# Patient Record
Sex: FEMALE | Race: WHITE | Employment: FULL TIME | URBAN - METROPOLITAN AREA
[De-identification: names, ages, dates, MRNs, and addresses within clinical notes are randomized per-mention and may not be internally consistent; named-entity substitution may affect disease eponyms.]

---

## 2018-04-20 ENCOUNTER — OFFICE VISIT (OUTPATIENT)
Dept: INTERNAL MEDICINE CLINIC | Age: 53
End: 2018-04-20

## 2018-04-20 VITALS
HEART RATE: 78 BPM | OXYGEN SATURATION: 98 % | RESPIRATION RATE: 16 BRPM | HEIGHT: 64 IN | BODY MASS INDEX: 35.34 KG/M2 | WEIGHT: 207 LBS | DIASTOLIC BLOOD PRESSURE: 80 MMHG | SYSTOLIC BLOOD PRESSURE: 112 MMHG

## 2018-04-20 DIAGNOSIS — D06.9 SEVERE DYSPLASIA OF CERVIX (CIN III): ICD-10-CM

## 2018-04-20 DIAGNOSIS — F33.1 MODERATE EPISODE OF RECURRENT MAJOR DEPRESSIVE DISORDER (HCC): ICD-10-CM

## 2018-04-20 DIAGNOSIS — N32.81 OVERACTIVE BLADDER: ICD-10-CM

## 2018-04-20 DIAGNOSIS — K63.5 POLYP OF COLON, UNSPECIFIED PART OF COLON, UNSPECIFIED TYPE: ICD-10-CM

## 2018-04-20 DIAGNOSIS — I10 ESSENTIAL HYPERTENSION: ICD-10-CM

## 2018-04-20 DIAGNOSIS — E53.8 B12 DEFICIENCY: ICD-10-CM

## 2018-04-20 DIAGNOSIS — E03.9 ACQUIRED HYPOTHYROIDISM: Primary | ICD-10-CM

## 2018-04-20 DIAGNOSIS — E78.2 MIXED HYPERLIPIDEMIA: ICD-10-CM

## 2018-04-20 PROCEDURE — 99204 OFFICE O/P NEW MOD 45 MIN: CPT | Performed by: INTERNAL MEDICINE

## 2018-04-20 RX ORDER — LEVOTHYROXINE SODIUM 0.07 MG/1
100 TABLET ORAL
COMMUNITY
End: 2018-05-10 | Stop reason: DRUGHIGH

## 2018-04-20 RX ORDER — LISINOPRIL AND HYDROCHLOROTHIAZIDE 12.5; 1 MG/1; MG/1
1 TABLET ORAL DAILY
Qty: 90 TABLET | Refills: 3 | Status: SHIPPED | OUTPATIENT
Start: 2018-04-20 | End: 2019-04-09 | Stop reason: SDUPTHER

## 2018-04-20 RX ORDER — OXYBUTYNIN CHLORIDE 10 MG/1
10 TABLET, EXTENDED RELEASE ORAL DAILY
COMMUNITY
Start: 2018-04-08 | End: 2020-02-20

## 2018-04-20 RX ORDER — VITAMIN B COMPLEX
1 CAPSULE ORAL DAILY
COMMUNITY
End: 2020-02-20

## 2018-04-20 RX ORDER — ATORVASTATIN CALCIUM 40 MG/1
20 TABLET, FILM COATED ORAL
COMMUNITY
End: 2018-05-03

## 2018-04-20 RX ORDER — UBIDECARENONE 75 MG
100 CAPSULE ORAL
COMMUNITY
End: 2018-05-04

## 2018-04-20 RX ORDER — LISINOPRIL AND HYDROCHLOROTHIAZIDE 12.5; 1 MG/1; MG/1
1 TABLET ORAL
COMMUNITY
End: 2018-04-20 | Stop reason: SDUPTHER

## 2018-04-20 RX ORDER — CITALOPRAM 20 MG/1
20 TABLET ORAL
COMMUNITY
End: 2018-05-03 | Stop reason: SDUPTHER

## 2018-04-20 ASSESSMENT — PATIENT HEALTH QUESTIONNAIRE - PHQ9
2. FEELING DOWN, DEPRESSED OR HOPELESS: 0
SUM OF ALL RESPONSES TO PHQ QUESTIONS 1-9: 0
1. LITTLE INTEREST OR PLEASURE IN DOING THINGS: 0
SUM OF ALL RESPONSES TO PHQ9 QUESTIONS 1 & 2: 0

## 2018-05-03 ENCOUNTER — OFFICE VISIT (OUTPATIENT)
Dept: INTERNAL MEDICINE CLINIC | Age: 53
End: 2018-05-03

## 2018-05-03 VITALS
WEIGHT: 206.6 LBS | BODY MASS INDEX: 35.46 KG/M2 | RESPIRATION RATE: 14 BRPM | HEART RATE: 74 BPM | DIASTOLIC BLOOD PRESSURE: 80 MMHG | OXYGEN SATURATION: 97 % | SYSTOLIC BLOOD PRESSURE: 125 MMHG

## 2018-05-03 DIAGNOSIS — F33.1 MODERATE EPISODE OF RECURRENT MAJOR DEPRESSIVE DISORDER (HCC): ICD-10-CM

## 2018-05-03 DIAGNOSIS — Z11.4 SCREENING FOR HIV (HUMAN IMMUNODEFICIENCY VIRUS): ICD-10-CM

## 2018-05-03 DIAGNOSIS — E53.8 B12 DEFICIENCY: ICD-10-CM

## 2018-05-03 DIAGNOSIS — Z01.818 PRE-OP EVALUATION: ICD-10-CM

## 2018-05-03 DIAGNOSIS — D06.9 SEVERE DYSPLASIA OF CERVIX (CIN III): ICD-10-CM

## 2018-05-03 DIAGNOSIS — I10 ESSENTIAL HYPERTENSION: ICD-10-CM

## 2018-05-03 DIAGNOSIS — E03.9 ACQUIRED HYPOTHYROIDISM: ICD-10-CM

## 2018-05-03 DIAGNOSIS — E78.2 MIXED HYPERLIPIDEMIA: ICD-10-CM

## 2018-05-03 DIAGNOSIS — Z11.59 ENCOUNTER FOR HEPATITIS C SCREENING TEST FOR LOW RISK PATIENT: ICD-10-CM

## 2018-05-03 DIAGNOSIS — Z01.818 PRE-OP EVALUATION: Primary | ICD-10-CM

## 2018-05-03 DIAGNOSIS — Z13.1 ENCOUNTER FOR SCREENING FOR DIABETES MELLITUS: ICD-10-CM

## 2018-05-03 DIAGNOSIS — Z23 NEED FOR PROPHYLACTIC VACCINATION AND INOCULATION AGAINST VARICELLA: ICD-10-CM

## 2018-05-03 DIAGNOSIS — Z12.31 ENCOUNTER FOR SCREENING MAMMOGRAM FOR BREAST CANCER: ICD-10-CM

## 2018-05-03 LAB
A/G RATIO: 2.5 (ref 1.1–2.2)
ALBUMIN SERPL-MCNC: 4.9 G/DL (ref 3.4–5)
ALP BLD-CCNC: 80 U/L (ref 40–129)
ALT SERPL-CCNC: 27 U/L (ref 10–40)
ANION GAP SERPL CALCULATED.3IONS-SCNC: 16 MMOL/L (ref 3–16)
AST SERPL-CCNC: 23 U/L (ref 15–37)
BASOPHILS ABSOLUTE: 0.1 K/UL (ref 0–0.2)
BASOPHILS RELATIVE PERCENT: 1.1 %
BILIRUB SERPL-MCNC: 0.6 MG/DL (ref 0–1)
BUN BLDV-MCNC: 11 MG/DL (ref 7–20)
CALCIUM SERPL-MCNC: 9.9 MG/DL (ref 8.3–10.6)
CHLORIDE BLD-SCNC: 99 MMOL/L (ref 99–110)
CHOLESTEROL, FASTING: 240 MG/DL (ref 0–199)
CO2: 26 MMOL/L (ref 21–32)
CREAT SERPL-MCNC: <0.5 MG/DL (ref 0.6–1.1)
EOSINOPHILS ABSOLUTE: 0.3 K/UL (ref 0–0.6)
EOSINOPHILS RELATIVE PERCENT: 4.9 %
GFR AFRICAN AMERICAN: >60
GFR NON-AFRICAN AMERICAN: >60
GLOBULIN: 2 G/DL
GLUCOSE BLD-MCNC: 98 MG/DL (ref 70–99)
HCT VFR BLD CALC: 41.6 % (ref 36–48)
HDLC SERPL-MCNC: 59 MG/DL (ref 40–60)
HEMOGLOBIN: 14.6 G/DL (ref 12–16)
HEPATITIS C ANTIBODY INTERPRETATION: NORMAL
LDL CHOLESTEROL CALCULATED: 147 MG/DL
LYMPHOCYTES ABSOLUTE: 1.8 K/UL (ref 1–5.1)
LYMPHOCYTES RELATIVE PERCENT: 31.1 %
MCH RBC QN AUTO: 32.7 PG (ref 26–34)
MCHC RBC AUTO-ENTMCNC: 35.1 G/DL (ref 31–36)
MCV RBC AUTO: 93.2 FL (ref 80–100)
MONOCYTES ABSOLUTE: 0.3 K/UL (ref 0–1.3)
MONOCYTES RELATIVE PERCENT: 5.7 %
NEUTROPHILS ABSOLUTE: 3.3 K/UL (ref 1.7–7.7)
NEUTROPHILS RELATIVE PERCENT: 57.2 %
PDW BLD-RTO: 13 % (ref 12.4–15.4)
PLATELET # BLD: 255 K/UL (ref 135–450)
PMV BLD AUTO: 8.4 FL (ref 5–10.5)
POTASSIUM SERPL-SCNC: 4.2 MMOL/L (ref 3.5–5.1)
RBC # BLD: 4.46 M/UL (ref 4–5.2)
SODIUM BLD-SCNC: 141 MMOL/L (ref 136–145)
TOTAL PROTEIN: 6.9 G/DL (ref 6.4–8.2)
TRIGLYCERIDE, FASTING: 171 MG/DL (ref 0–150)
TSH REFLEX FT4: 1.04 UIU/ML (ref 0.27–4.2)
VITAMIN B-12: 1365 PG/ML (ref 211–911)
VLDLC SERPL CALC-MCNC: 34 MG/DL
WBC # BLD: 5.7 K/UL (ref 4–11)

## 2018-05-03 PROCEDURE — 93000 ELECTROCARDIOGRAM COMPLETE: CPT | Performed by: INTERNAL MEDICINE

## 2018-05-03 PROCEDURE — 99243 OFF/OP CNSLTJ NEW/EST LOW 30: CPT | Performed by: INTERNAL MEDICINE

## 2018-05-03 PROCEDURE — 99213 OFFICE O/P EST LOW 20 MIN: CPT | Performed by: INTERNAL MEDICINE

## 2018-05-03 RX ORDER — ROSUVASTATIN CALCIUM 10 MG/1
10 TABLET, COATED ORAL NIGHTLY
Qty: 30 TABLET | Refills: 3 | Status: SHIPPED | OUTPATIENT
Start: 2018-05-03 | End: 2018-09-25 | Stop reason: SDUPTHER

## 2018-05-03 RX ORDER — CITALOPRAM 20 MG/1
20 TABLET ORAL DAILY
Qty: 90 TABLET | Refills: 3 | Status: SHIPPED | OUTPATIENT
Start: 2018-05-03 | End: 2019-06-04

## 2018-05-04 LAB
HIV AG/AB: NORMAL
HIV ANTIGEN: NORMAL
HIV-1 ANTIBODY: NORMAL
HIV-2 AB: NORMAL

## 2018-05-10 ENCOUNTER — HOSPITAL ENCOUNTER (OUTPATIENT)
Dept: PREADMISSION TESTING | Age: 53
Discharge: HOME | End: 2018-05-10
Attending: OBSTETRICS & GYNECOLOGY | Admitting: OBSTETRICS & GYNECOLOGY

## 2018-05-10 ENCOUNTER — SURG/PROC ORDERS (OUTPATIENT)
Dept: ANESTHESIOLOGY | Age: 53
End: 2018-05-10

## 2018-05-10 ENCOUNTER — PAT TELEPHONE (OUTPATIENT)
Dept: PREADMISSION TESTING | Age: 53
End: 2018-05-10

## 2018-05-10 VITALS — BODY MASS INDEX: 35.17 KG/M2 | HEIGHT: 64 IN | WEIGHT: 206 LBS

## 2018-05-10 LAB
ABO/RH: NORMAL
ANTIBODY SCREEN: NORMAL
BASOPHILS ABSOLUTE: 0.1 K/UL (ref 0–0.2)
BASOPHILS RELATIVE PERCENT: 1.1 %
EOSINOPHILS ABSOLUTE: 0.2 K/UL (ref 0–0.6)
EOSINOPHILS RELATIVE PERCENT: 3.4 %
HCT VFR BLD CALC: 39.9 % (ref 36–48)
HEMOGLOBIN: 14.1 G/DL (ref 12–16)
LYMPHOCYTES ABSOLUTE: 2.3 K/UL (ref 1–5.1)
LYMPHOCYTES RELATIVE PERCENT: 32.7 %
MCH RBC QN AUTO: 32.5 PG (ref 26–34)
MCHC RBC AUTO-ENTMCNC: 35.3 G/DL (ref 31–36)
MCV RBC AUTO: 92.1 FL (ref 80–100)
MONOCYTES ABSOLUTE: 0.4 K/UL (ref 0–1.3)
MONOCYTES RELATIVE PERCENT: 5.7 %
NEUTROPHILS ABSOLUTE: 4.1 K/UL (ref 1.7–7.7)
NEUTROPHILS RELATIVE PERCENT: 57.1 %
PDW BLD-RTO: 13 % (ref 12.4–15.4)
PLATELET # BLD: 265 K/UL (ref 135–450)
PMV BLD AUTO: 8.1 FL (ref 5–10.5)
RBC # BLD: 4.33 M/UL (ref 4–5.2)
WBC # BLD: 7.1 K/UL (ref 4–11)

## 2018-05-10 RX ORDER — LEVOTHYROXINE SODIUM 0.1 MG/1
100 TABLET ORAL DAILY
COMMUNITY
End: 2018-06-04 | Stop reason: SDUPTHER

## 2018-05-10 RX ORDER — SODIUM CHLORIDE 0.9 % (FLUSH) 0.9 %
10 SYRINGE (ML) INJECTION PRN
Status: CANCELLED | OUTPATIENT
Start: 2018-05-10

## 2018-05-10 RX ORDER — SODIUM CHLORIDE 9 MG/ML
INJECTION, SOLUTION INTRAVENOUS CONTINUOUS
Status: CANCELLED | OUTPATIENT
Start: 2018-05-10

## 2018-05-10 RX ORDER — SODIUM CHLORIDE 0.9 % (FLUSH) 0.9 %
10 SYRINGE (ML) INJECTION EVERY 12 HOURS SCHEDULED
Status: CANCELLED | OUTPATIENT
Start: 2018-05-10

## 2018-05-14 ENCOUNTER — HOSPITAL ENCOUNTER (OUTPATIENT)
Dept: SURGERY | Age: 53
Discharge: HOME | End: 2018-05-14
Attending: OBSTETRICS & GYNECOLOGY | Admitting: OBSTETRICS & GYNECOLOGY

## 2018-05-14 VITALS
HEART RATE: 95 BPM | TEMPERATURE: 98 F | SYSTOLIC BLOOD PRESSURE: 114 MMHG | RESPIRATION RATE: 16 BRPM | WEIGHT: 207.89 LBS | OXYGEN SATURATION: 98 % | BODY MASS INDEX: 35.68 KG/M2 | DIASTOLIC BLOOD PRESSURE: 73 MMHG

## 2018-05-14 DIAGNOSIS — D06.9 CIN III (CERVICAL INTRAEPITHELIAL NEOPLASIA GRADE III) WITH SEVERE DYSPLASIA: Primary | ICD-10-CM

## 2018-05-14 LAB
ABO/RH: NORMAL
ANTIBODY SCREEN: NORMAL

## 2018-05-14 RX ORDER — OXYCODONE HYDROCHLORIDE AND ACETAMINOPHEN 5; 325 MG/1; MG/1
1 TABLET ORAL EVERY 6 HOURS PRN
Qty: 20 TABLET | Refills: 0 | Status: SHIPPED | OUTPATIENT
Start: 2018-05-14 | End: 2018-05-21

## 2018-05-14 RX ORDER — SODIUM CHLORIDE 0.9 % (FLUSH) 0.9 %
10 SYRINGE (ML) INJECTION EVERY 12 HOURS SCHEDULED
Status: DISCONTINUED | OUTPATIENT
Start: 2018-05-14 | End: 2018-05-15 | Stop reason: HOSPADM

## 2018-05-14 RX ORDER — FENTANYL CITRATE 50 UG/ML
25 INJECTION, SOLUTION INTRAMUSCULAR; INTRAVENOUS EVERY 5 MIN PRN
Status: COMPLETED | OUTPATIENT
Start: 2018-05-14 | End: 2018-05-14

## 2018-05-14 RX ORDER — ONDANSETRON 2 MG/ML
4 INJECTION INTRAMUSCULAR; INTRAVENOUS
Status: ACTIVE | OUTPATIENT
Start: 2018-05-14 | End: 2018-05-14

## 2018-05-14 RX ORDER — FENTANYL CITRATE 50 UG/ML
25 INJECTION, SOLUTION INTRAMUSCULAR; INTRAVENOUS EVERY 5 MIN PRN
Status: DISCONTINUED | OUTPATIENT
Start: 2018-05-14 | End: 2018-05-15 | Stop reason: HOSPADM

## 2018-05-14 RX ORDER — IBUPROFEN 800 MG/1
800 TABLET ORAL EVERY 8 HOURS PRN
Qty: 40 TABLET | Refills: 0 | Status: SHIPPED | OUTPATIENT
Start: 2018-05-14 | End: 2018-09-20

## 2018-05-14 RX ORDER — DOCUSATE SODIUM 100 MG/1
100 CAPSULE, LIQUID FILLED ORAL 2 TIMES DAILY PRN
Qty: 60 CAPSULE | Refills: 0 | Status: SHIPPED | OUTPATIENT
Start: 2018-05-14 | End: 2018-09-20

## 2018-05-14 RX ORDER — SODIUM CHLORIDE 0.9 % (FLUSH) 0.9 %
10 SYRINGE (ML) INJECTION PRN
Status: DISCONTINUED | OUTPATIENT
Start: 2018-05-14 | End: 2018-05-15 | Stop reason: HOSPADM

## 2018-05-14 RX ORDER — OXYCODONE HYDROCHLORIDE AND ACETAMINOPHEN 5; 325 MG/1; MG/1
2 TABLET ORAL PRN
Status: COMPLETED | OUTPATIENT
Start: 2018-05-14 | End: 2018-05-14

## 2018-05-14 RX ORDER — OXYCODONE HYDROCHLORIDE AND ACETAMINOPHEN 5; 325 MG/1; MG/1
1 TABLET ORAL PRN
Status: COMPLETED | OUTPATIENT
Start: 2018-05-14 | End: 2018-05-14

## 2018-05-14 RX ORDER — SODIUM CHLORIDE 9 MG/ML
INJECTION, SOLUTION INTRAVENOUS CONTINUOUS
Status: DISCONTINUED | OUTPATIENT
Start: 2018-05-14 | End: 2018-05-15 | Stop reason: HOSPADM

## 2018-05-14 RX ADMIN — FENTANYL CITRATE 25 MCG: 50 INJECTION, SOLUTION INTRAMUSCULAR; INTRAVENOUS at 10:22

## 2018-05-14 RX ADMIN — FENTANYL CITRATE 25 MCG: 50 INJECTION, SOLUTION INTRAMUSCULAR; INTRAVENOUS at 10:06

## 2018-05-14 RX ADMIN — SODIUM CHLORIDE: 9 INJECTION, SOLUTION INTRAVENOUS at 06:53

## 2018-05-14 RX ADMIN — FENTANYL CITRATE 25 MCG: 50 INJECTION, SOLUTION INTRAMUSCULAR; INTRAVENOUS at 10:01

## 2018-05-14 RX ADMIN — FENTANYL CITRATE 25 MCG: 50 INJECTION, SOLUTION INTRAMUSCULAR; INTRAVENOUS at 09:42

## 2018-05-14 RX ADMIN — OXYCODONE HYDROCHLORIDE AND ACETAMINOPHEN 2 TABLET: 5; 325 TABLET ORAL at 11:04

## 2018-05-14 ASSESSMENT — PAIN SCALES - GENERAL
PAINLEVEL_OUTOF10: 6
PAINLEVEL_OUTOF10: 10
PAINLEVEL_OUTOF10: 9
PAINLEVEL_OUTOF10: 7
PAINLEVEL_OUTOF10: 7
PAINLEVEL_OUTOF10: 8
PAINLEVEL_OUTOF10: 5
PAINLEVEL_OUTOF10: 6
PAINLEVEL_OUTOF10: 3
PAINLEVEL_OUTOF10: 7

## 2018-05-14 ASSESSMENT — PAIN DESCRIPTION - LOCATION
LOCATION: ABDOMEN

## 2018-05-14 ASSESSMENT — PAIN DESCRIPTION - ORIENTATION: ORIENTATION: LOWER

## 2018-05-14 ASSESSMENT — PAIN DESCRIPTION - PAIN TYPE
TYPE: SURGICAL PAIN

## 2018-05-14 ASSESSMENT — PAIN - FUNCTIONAL ASSESSMENT: PAIN_FUNCTIONAL_ASSESSMENT: 0-10

## 2018-05-14 ASSESSMENT — PAIN DESCRIPTION - DESCRIPTORS: DESCRIPTORS: DISCOMFORT;PRESSURE

## 2018-05-14 ASSESSMENT — ENCOUNTER SYMPTOMS: SHORTNESS OF BREATH: 0

## 2018-06-03 ENCOUNTER — PATIENT MESSAGE (OUTPATIENT)
Dept: INTERNAL MEDICINE CLINIC | Age: 53
End: 2018-06-03

## 2018-06-03 DIAGNOSIS — E03.9 ACQUIRED HYPOTHYROIDISM: Primary | ICD-10-CM

## 2018-06-04 RX ORDER — LEVOTHYROXINE SODIUM 0.1 MG/1
100 TABLET ORAL DAILY
Qty: 30 TABLET | Refills: 3 | Status: SHIPPED | OUTPATIENT
Start: 2018-06-04 | End: 2018-09-24 | Stop reason: SDUPTHER

## 2018-09-20 ENCOUNTER — OFFICE VISIT (OUTPATIENT)
Dept: INTERNAL MEDICINE CLINIC | Age: 53
End: 2018-09-20

## 2018-09-20 VITALS
RESPIRATION RATE: 12 BRPM | HEART RATE: 70 BPM | DIASTOLIC BLOOD PRESSURE: 82 MMHG | SYSTOLIC BLOOD PRESSURE: 122 MMHG | OXYGEN SATURATION: 97 %

## 2018-09-20 DIAGNOSIS — E78.2 MIXED HYPERLIPIDEMIA: ICD-10-CM

## 2018-09-20 DIAGNOSIS — Z12.31 ENCOUNTER FOR SCREENING MAMMOGRAM FOR BREAST CANCER: ICD-10-CM

## 2018-09-20 DIAGNOSIS — F33.1 MODERATE EPISODE OF RECURRENT MAJOR DEPRESSIVE DISORDER (HCC): ICD-10-CM

## 2018-09-20 DIAGNOSIS — E03.9 ACQUIRED HYPOTHYROIDISM: ICD-10-CM

## 2018-09-20 DIAGNOSIS — K92.1 MELENA: ICD-10-CM

## 2018-09-20 DIAGNOSIS — R19.7 DIARRHEA, UNSPECIFIED TYPE: Primary | ICD-10-CM

## 2018-09-20 DIAGNOSIS — R10.13 DYSPEPSIA: ICD-10-CM

## 2018-09-20 DIAGNOSIS — Z23 NEED FOR INFLUENZA VACCINATION: ICD-10-CM

## 2018-09-20 PROBLEM — F41.9 ANXIETY: Status: ACTIVE | Noted: 2018-09-20

## 2018-09-20 PROBLEM — C53.9 SQUAMOUS CELL CARCINOMA OF CERVIX (HCC): Status: ACTIVE | Noted: 2018-04-30

## 2018-09-20 PROBLEM — E06.3 HASHIMOTO'S THYROIDITIS: Status: ACTIVE | Noted: 2018-09-20

## 2018-09-20 PROBLEM — E53.8 B12 DEFICIENCY: Status: RESOLVED | Noted: 2018-04-20 | Resolved: 2018-09-20

## 2018-09-20 PROCEDURE — 90682 RIV4 VACC RECOMBINANT DNA IM: CPT | Performed by: INTERNAL MEDICINE

## 2018-09-20 PROCEDURE — 99214 OFFICE O/P EST MOD 30 MIN: CPT | Performed by: INTERNAL MEDICINE

## 2018-09-20 PROCEDURE — 90471 IMMUNIZATION ADMIN: CPT | Performed by: INTERNAL MEDICINE

## 2018-09-20 RX ORDER — OMEPRAZOLE 40 MG/1
40 CAPSULE, DELAYED RELEASE ORAL DAILY
Qty: 30 CAPSULE | Refills: 1 | Status: SHIPPED | OUTPATIENT
Start: 2018-09-20 | End: 2018-11-16 | Stop reason: SDUPTHER

## 2018-09-20 RX ORDER — PAROXETINE HYDROCHLORIDE 20 MG/1
TABLET, FILM COATED ORAL
COMMUNITY
Start: 2018-09-19 | End: 2021-06-14

## 2018-09-20 NOTE — PATIENT INSTRUCTIONS
Use a probiotic and avoid FODMAP foods (information online). Patient Education        Indigestion (Dyspepsia or Heartburn): Care Instructions  Your Care Instructions  Sometimes it can be hard to pinpoint the cause of indigestion. (It is also called dyspepsia or heartburn.) Most cases of an upset stomach with bloating, burning, burping, and nausea are minor and go away within several hours. Home treatment and over-the-counter medicine often are able to control symptoms. But if you take medicine to relieve your indigestion without making diet and lifestyle changes, your symptoms are likely to return again and again. If you get indigestion often, it may be a sign of a more serious medical problem. Be sure to follow up with your doctor, who may want to do tests to be sure of the cause of your indigestion. Follow-up care is a key part of your treatment and safety. Be sure to make and go to all appointments, and call your doctor if you are having problems. It's also a good idea to know your test results and keep a list of the medicines you take. How can you care for yourself at home? · Your doctor may recommend over-the-counter medicine. For mild or occasional indigestion, antacids such as Gaviscon, Mylanta, Maalox, or Tums, may help. Be safe with medicines. Be careful when you take over-the-counter antacid medicines. Many of these medicines have aspirin in them. Read the label to make sure that you are not taking more than the recommended dose. Too much aspirin can be harmful. · Your doctor also may recommend over-the-counter acid reducers, such as Pepcid AC, Tagamet HB, Zantac 75, or Prilosec. Read and follow all instructions on the label. If you use these medicines often, talk with your doctor. · Change your eating habits. ¨ It's best to eat several small meals instead of two or three large meals. ¨ After you eat, wait 2 to 3 hours before you lie down.   ¨ Chocolate, mint, and alcohol can make GERD worse.  ¨ Spicy foods, foods that have a lot of acid (like tomatoes and oranges), and coffee can make GERD symptoms worse in some people. If your symptoms are worse after you eat a certain food, you may want to stop eating that food to see if your symptoms get better. · Do not smoke or chew tobacco. Smoking can make GERD worse. If you need help quitting, talk to your doctor about stop-smoking programs and medicines. These can increase your chances of quitting for good. · If you have GERD symptoms at night, raise the head of your bed 6 to 8 inches. You can do this by putting the frame on blocks or placing a foam wedge under the head of your mattress. (Adding extra pillows does not work.)  · Do not wear tight clothing around your middle. · Lose weight if you need to. Losing just 5 to 10 pounds can help. · Do not take anti-inflammatory medicines, such as aspirin, ibuprofen (Advil, Motrin), or naproxen (Aleve). These can irritate the stomach. If you need a pain medicine, try acetaminophen (Tylenol), which does not cause stomach upset. When should you call for help? Call your doctor now or seek immediate medical care if:    · You have new or worse belly pain.     · You are vomiting.    Watch closely for changes in your health, and be sure to contact your doctor if:    · You have new or worse symptoms of indigestion.     · You have trouble or pain swallowing.     · You are losing weight.     · You do not get better as expected. Where can you learn more? Go to [email or web address] and sign in to your [employer] account. Enter B884 in the [facility] box to learn more about \"Indigestion (Dyspepsia or Heartburn): Care Instructions. \"     If you do not have an account, please click on the \"Sign Up Now\" link. Current as of: May 12, 2017  Content Version: 11.7  © 7292-7969 [employer]. Care instructions adapted under license by [facility] ([facility]).  If you have questions about a

## 2018-09-20 NOTE — PROGRESS NOTES
Chief Complaint   Patient presents with    Diarrhea     several times a day, soft not diarrhea     HPI: 46 yo woman with vit B12 deficiency and hypothyroidism with recent hysterectomy (05/14/18) presenting with 1 month of diarrhea; patient having 10-12 BMs/day causing her to wake up at night. Watery stool towards the end of the day and she noticed undigested food when she defecates. She endorses consistent mild nausea, 1 episode of vomiting she believes is unrelated, \"mucousy\" stool, fatigue, and possibly melena. She denies bright red blood, abdominal pain, weight changes, and appetite changes. She has not traveled outside of the country, no hospital admissions other than for surgery, no antibiotic use (possibly given in IV prior to surgery), and no potential occupational exposures. She says this occurred before, possibly lasting for a month and a half, but \"this episode is showing no signs of slowing up\". She also mentions burning epigastric pain for > 1 month, but no acidic taste in mouth. She also has hx of overactive bladder for which she takes oxybutynin 10 mg; it was controlled, but she is now experiencing an intense squeezing sensation like before. ROS (1+):    Medications reviewed and reconciled with what patient reports to be taking. /82   Pulse 70   Resp 12   LMP 03/04/2009   SpO2 97%     Physical Exam   Constitutional: She is well-developed, well-nourished, and in no distress. No distress. Cardiovascular: Normal rate, regular rhythm, normal heart sounds and intact distal pulses. Exam reveals no gallop and no friction rub. No murmur heard. Pulmonary/Chest: Effort normal and breath sounds normal.   Abdominal: Soft. Bowel sounds are normal. She exhibits no distension and no mass. There is tenderness. There is no rebound and no guarding. Mild tenderness in RLQ  Well-healed, pink midline scar from recent hysterectomy    Musculoskeletal: She exhibits no edema.    Neurological: She exhibits normal muscle tone. Skin: Skin is warm and dry. No rash noted. No erythema. Psychiatric: Mood, memory, affect and judgment normal.       ASSESSMENT/PLAN: Pt received counseling and, if relevant, printed instructions for all symptoms listed in CC and HPI, as well as for all diagnoses listed below. 1. Diarrhea, unspecified type  - ordered fecal leukocytes, C diff toxin/antigen, and CMP     2. Dyspepsia  - ordered amylase and H. Pylori breath test  - evidence of gastritis/PUD so starting patient on omeprazole     3. Melena  - ordered blood occult stool in addition to CBC    4. Encounter for screening mammogram for breast cancer  - [facility] Digital Screen Bilateral [[id number]]; Future    5. Need for influenza vaccination  - INFLUENZA, QUADV, RECOMBINANT, 18 YRS AND OLDER, IM, PF, PREFILL SYR OR SDV, 0.5ML (FLUBLOK QUADV, PF)      Problem List Items Addressed This Visit     Moderate episode of recurrent major depressive disorder (HCC)    Relevant Medications    PARoxetine (PAXIL) 20 MG tablet    Mixed hyperlipidemia    Relevant Orders    LIPID PANEL    Acquired hypothyroidism    Relevant Orders    TSH WITH REFLEX TO FT4      Other Visit Diagnoses     Diarrhea, unspecified type    -  Primary    Relevant Orders    Fecal Leukocytes    C DIFF TOXIN/ANTIGEN    BLOOD OCCULT STOOL #1    CBC WITH AUTO DIFFERENTIAL    COMPREHENSIVE METABOLIC PANEL    Dyspepsia        Relevant Medications    omeprazole (PRILOSEC) 40 MG delayed release capsule    Other Relevant Orders    BLOOD OCCULT STOOL #1    CBC WITH AUTO DIFFERENTIAL    AMYLASE    H. Pylori Breath Test    Melena        Relevant Orders    BLOOD OCCULT STOOL #1    CBC WITH AUTO DIFFERENTIAL    H.  Pylori Breath Test    Encounter for screening mammogram for breast cancer        Relevant Orders    [facility] Digital Screen Bilateral [[id number]]    Need for influenza vaccination        Relevant Orders    INFLUENZA, QUADV, RECOMBINANT, 18 YRS AND OLDER, IM, PF, PREFILL SYR OR SDV, 0.5ML (FLUBLOK QUADV, PF) (Completed)            Return in about 1 week (around 9/27/2018) for results and recheck. Jessica Bennett, [address] [place] [address]    I have personally seen, examined, evaluated, and developed the treatment plan for the above patient, as documented by the student above who was directly under my supervision.     Dominick Veras MD

## 2018-09-21 DIAGNOSIS — E03.9 ACQUIRED HYPOTHYROIDISM: ICD-10-CM

## 2018-09-21 DIAGNOSIS — R10.13 DYSPEPSIA: ICD-10-CM

## 2018-09-21 DIAGNOSIS — E78.2 MIXED HYPERLIPIDEMIA: ICD-10-CM

## 2018-09-21 DIAGNOSIS — K92.1 MELENA: ICD-10-CM

## 2018-09-21 DIAGNOSIS — R19.7 DIARRHEA, UNSPECIFIED TYPE: ICD-10-CM

## 2018-09-21 LAB
A/G RATIO: 2.4 (ref 1.1–2.2)
ALBUMIN SERPL-MCNC: 4.8 G/DL (ref 3.4–5)
ALP BLD-CCNC: 68 U/L (ref 40–129)
ALT SERPL-CCNC: 24 U/L (ref 10–40)
AMYLASE: 33 U/L (ref 25–115)
ANION GAP SERPL CALCULATED.3IONS-SCNC: 16 MMOL/L (ref 3–16)
AST SERPL-CCNC: 22 U/L (ref 15–37)
BASOPHILS ABSOLUTE: 0.1 K/UL (ref 0–0.2)
BASOPHILS RELATIVE PERCENT: 1.1 %
BILIRUB SERPL-MCNC: 0.6 MG/DL (ref 0–1)
BUN BLDV-MCNC: 16 MG/DL (ref 7–20)
C DIFFICILE TOXIN, EIA: NORMAL
CALCIUM SERPL-MCNC: 9.5 MG/DL (ref 8.3–10.6)
CHLORIDE BLD-SCNC: 102 MMOL/L (ref 99–110)
CHOLESTEROL, TOTAL: 259 MG/DL (ref 0–199)
CO2: 23 MMOL/L (ref 21–32)
CREAT SERPL-MCNC: 0.6 MG/DL (ref 0.6–1.1)
EOSINOPHILS ABSOLUTE: 0.2 K/UL (ref 0–0.6)
EOSINOPHILS RELATIVE PERCENT: 2.9 %
GFR AFRICAN AMERICAN: >60
GFR NON-AFRICAN AMERICAN: >60
GLOBULIN: 2 G/DL
GLUCOSE BLD-MCNC: 98 MG/DL (ref 70–99)
HCT VFR BLD CALC: 40.8 % (ref 36–48)
HDLC SERPL-MCNC: 52 MG/DL (ref 40–60)
HEMOGLOBIN: 13.8 G/DL (ref 12–16)
LDL CHOLESTEROL CALCULATED: 154 MG/DL
LYMPHOCYTES ABSOLUTE: 1.4 K/UL (ref 1–5.1)
LYMPHOCYTES RELATIVE PERCENT: 25.5 %
MCH RBC QN AUTO: 32.6 PG (ref 26–34)
MCHC RBC AUTO-ENTMCNC: 33.9 G/DL (ref 31–36)
MCV RBC AUTO: 96.3 FL (ref 80–100)
MONOCYTES ABSOLUTE: 0.4 K/UL (ref 0–1.3)
MONOCYTES RELATIVE PERCENT: 7.5 %
NEUTROPHILS ABSOLUTE: 3.5 K/UL (ref 1.7–7.7)
NEUTROPHILS RELATIVE PERCENT: 63 %
OCCULT BLOOD DIAGNOSTIC: NORMAL
PDW BLD-RTO: 13.1 % (ref 12.4–15.4)
PLATELET # BLD: 263 K/UL (ref 135–450)
PMV BLD AUTO: 8.5 FL (ref 5–10.5)
POTASSIUM SERPL-SCNC: 4.2 MMOL/L (ref 3.5–5.1)
RBC # BLD: 4.23 M/UL (ref 4–5.2)
SODIUM BLD-SCNC: 141 MMOL/L (ref 136–145)
TOTAL PROTEIN: 6.8 G/DL (ref 6.4–8.2)
TRIGL SERPL-MCNC: 267 MG/DL (ref 0–150)
TSH REFLEX FT4: 2.98 UIU/ML (ref 0.27–4.2)
VLDLC SERPL CALC-MCNC: 53 MG/DL
WBC # BLD: 5.6 K/UL (ref 4–11)
WHITE BLOOD CELLS (WBC), STOOL: NORMAL

## 2018-09-24 DIAGNOSIS — E03.9 ACQUIRED HYPOTHYROIDISM: ICD-10-CM

## 2018-09-24 LAB — H PYLORI BREATH TEST: NEGATIVE

## 2018-09-24 RX ORDER — LEVOTHYROXINE SODIUM 0.1 MG/1
TABLET ORAL
Qty: 30 TABLET | Refills: 3 | Status: SHIPPED | OUTPATIENT
Start: 2018-09-24 | End: 2019-02-08 | Stop reason: SDUPTHER

## 2018-09-25 DIAGNOSIS — E78.2 MIXED HYPERLIPIDEMIA: ICD-10-CM

## 2018-09-25 RX ORDER — ROSUVASTATIN CALCIUM 10 MG/1
TABLET, COATED ORAL
Qty: 90 TABLET | Refills: 0 | Status: SHIPPED | OUTPATIENT
Start: 2018-09-25 | End: 2018-12-21 | Stop reason: SDUPTHER

## 2018-09-25 NOTE — TELEPHONE ENCOUNTER
Patient requesting a medication refill.   Medication: crestor  Dosage: 10 mg  Frequency: 1 tab at bedtime  Last filled on: 6/30/18  Pharm[facility]: [facility]  Next office visit: 9/27/18  Last regular office visit: 5/3/18

## 2018-09-27 ENCOUNTER — OFFICE VISIT (OUTPATIENT)
Dept: INTERNAL MEDICINE CLINIC | Age: 53
End: 2018-09-27
Payer: COMMERCIAL

## 2018-09-27 VITALS
BODY MASS INDEX: 36.7 KG/M2 | SYSTOLIC BLOOD PRESSURE: 107 MMHG | HEIGHT: 64 IN | HEART RATE: 74 BPM | DIASTOLIC BLOOD PRESSURE: 73 MMHG | OXYGEN SATURATION: 95 % | WEIGHT: 215 LBS | RESPIRATION RATE: 18 BRPM

## 2018-09-27 DIAGNOSIS — K90.9 INTESTINAL MALABSORPTION, UNSPECIFIED TYPE: ICD-10-CM

## 2018-09-27 DIAGNOSIS — E78.2 MIXED HYPERLIPIDEMIA: ICD-10-CM

## 2018-09-27 DIAGNOSIS — R19.7 DIARRHEA, UNSPECIFIED TYPE: Primary | ICD-10-CM

## 2018-09-27 PROCEDURE — 99213 OFFICE O/P EST LOW 20 MIN: CPT | Performed by: INTERNAL MEDICINE

## 2018-09-27 RX ORDER — CHOLESTYRAMINE 4 G/9G
1 POWDER, FOR SUSPENSION ORAL DAILY
Qty: 30 PACKET | Refills: 1 | Status: SHIPPED | OUTPATIENT
Start: 2018-09-27 | End: 2018-11-22 | Stop reason: SDUPTHER

## 2018-09-27 NOTE — PROGRESS NOTES
Chief Complaint   Patient presents with    Follow-up     HPI: Here to follow-up on test results and her complaints of diarrhea and abdominal cramping. States her symptoms remain unchanged neither better nor worse. Continues to eat normally but feels to cause her to cramp and have a bowel movement. She states she is having 10-12 diarrheal stools per day but still has not seen any mucus or blood. No improvement with probiotics    ROS (1+): No fever or chills    Reviewed all her labs and imaging today with patient      Medications reviewed and reconciled with what patient reports to be taking. /73   Pulse 74   Resp 18   Ht 5' 4\" (1.626 m)   Wt 215 lb (97.5 kg)   LMP 03/04/2009   SpO2 95%   BMI 36.90 kg/m²     Physical Exam GENERAL: alert, oriented x4, well-appearing in NAD      Vitals reviewed from intake /73   Pulse 74   Resp 18   Ht 5' 4\" (1.626 m)   Wt 215 lb (97.5 kg)   LMP 03/04/2009   SpO2 95%   BMI 36.90 kg/m²     HEENT: normocephalic atraumatic clear conj/nares/op     NECK: supple without lymphadenopathy or thyromegaly, no bruit    COR: RRR no murmurs rubs or gallops    LUNGS: clear to auscultation with normal work of breathing    ABDOMEN: soft, nontender but indicates that she gets abdominal cramping across the upper abdomen, normal bowel sounds, no masses or organomegaly noted    EXTREMITIES: warm, dry, well-perfused, no edema    DERM: no suspicious lesions, no rashes    NEURO: cranial nerves intact, normal speech and gait    SPINE: straight, supple, nontender without swelling        ASSESSMENT/PLAN: Pt received counseling and, if relevant, printed instructions for all symptoms listed in CC and HPI, as well as for all diagnoses listed below. 1. Diarrhea, unspecified type unclear etiology with mixed symptoms--will try bile acid sequestrant, continue fiber, continue probiotics, continue low FODMAP diet  - cholestyramine (QUESTRAN) 4 g packet;  Take 1 packet by mouth daily Dispense: 30 packet; Refill: 1  - Kamini Alan MD (JOMAR)    2. Intestinal malabsorption, unspecified type  - cholestyramine (QUESTRAN) 4 g packet; Take 1 packet by mouth daily  Dispense: 30 packet; Refill: 1  - Kamini Alan MD (JOMAR)    3. Mixed hyperlipidemia  - cholestyramine (QUESTRAN) 4 g packet; Take 1 packet by mouth daily  Dispense: 30 packet; Refill: 1      Problem List Items Addressed This Visit     Mixed hyperlipidemia    Relevant Medications    cholestyramine (QUESTRAN) 4 g packet      Other Visit Diagnoses     Diarrhea, unspecified type    -  Primary    Relevant Medications    cholestyramine (QUESTRAN) 4 g packet    Other Relevant Orders    Kamini Alan MD (JOMAR)    Intestinal malabsorption, unspecified type        Relevant Medications    cholestyramine (QUESTRAN) 4 g packet    Other Relevant Orders    Kamini Alan MD (JOMAR)            No Follow-up on file.

## 2018-11-16 DIAGNOSIS — R10.13 DYSPEPSIA: ICD-10-CM

## 2018-11-16 RX ORDER — OMEPRAZOLE 40 MG/1
CAPSULE, DELAYED RELEASE ORAL
Qty: 30 CAPSULE | Refills: 5 | Status: SHIPPED | OUTPATIENT
Start: 2018-11-16 | End: 2019-03-14 | Stop reason: SDUPTHER

## 2018-11-16 NOTE — TELEPHONE ENCOUNTER
Patient requesting a medication refill.   Medication: omeprazole  Dosage: 40 mg  Frequency: 1 cap daily  Last filled on: 9/20/18  [facility]  Next office visit: not sheduled  Last regular office visit: 4/20/18

## 2018-12-21 DIAGNOSIS — E78.2 MIXED HYPERLIPIDEMIA: ICD-10-CM

## 2018-12-21 RX ORDER — ROSUVASTATIN CALCIUM 10 MG/1
TABLET, COATED ORAL
Qty: 90 TABLET | Refills: 0 | Status: SHIPPED | OUTPATIENT
Start: 2018-12-21 | End: 2019-03-15 | Stop reason: SDUPTHER

## 2019-02-08 DIAGNOSIS — E03.9 ACQUIRED HYPOTHYROIDISM: ICD-10-CM

## 2019-02-08 RX ORDER — LEVOTHYROXINE SODIUM 0.1 MG/1
TABLET ORAL
Qty: 30 TABLET | Refills: 3 | Status: SHIPPED | OUTPATIENT
Start: 2019-02-08 | End: 2019-06-10 | Stop reason: SDUPTHER

## 2019-03-14 DIAGNOSIS — R10.13 DYSPEPSIA: ICD-10-CM

## 2019-03-14 RX ORDER — OMEPRAZOLE 40 MG/1
CAPSULE, DELAYED RELEASE ORAL
Qty: 30 CAPSULE | Refills: 4 | Status: SHIPPED | OUTPATIENT
Start: 2019-03-14 | End: 2021-06-14 | Stop reason: SDUPTHER

## 2019-03-15 DIAGNOSIS — E78.2 MIXED HYPERLIPIDEMIA: ICD-10-CM

## 2019-03-15 RX ORDER — ROSUVASTATIN CALCIUM 10 MG/1
TABLET, COATED ORAL
Qty: 90 TABLET | Refills: 3 | Status: SHIPPED | OUTPATIENT
Start: 2019-03-15 | End: 2020-01-08 | Stop reason: SDUPTHER

## 2019-04-09 DIAGNOSIS — I10 ESSENTIAL HYPERTENSION: ICD-10-CM

## 2019-04-10 NOTE — TELEPHONE ENCOUNTER
Patient requesting a medication refill.   Medication prinzide  Dosage 10-12.5mg  Frequencydaily  [facility]20/18  [facility]  Next office visitnone  Last regular office visit9/27/18     BP Readings from Last 3 Encounters:   09/27/18 107/73   09/20/18 122/82   05/14/18 114/73

## 2019-04-11 RX ORDER — LISINOPRIL AND HYDROCHLOROTHIAZIDE 12.5; 1 MG/1; MG/1
TABLET ORAL
Qty: 90 TABLET | Refills: 2 | Status: SHIPPED | OUTPATIENT
Start: 2019-04-11 | End: 2020-01-20 | Stop reason: SDUPTHER

## 2019-06-04 ENCOUNTER — OFFICE VISIT (OUTPATIENT)
Dept: INTERNAL MEDICINE CLINIC | Age: 54
End: 2019-06-04
Payer: COMMERCIAL

## 2019-06-04 VITALS
WEIGHT: 228.6 LBS | BODY MASS INDEX: 38.09 KG/M2 | HEART RATE: 74 BPM | OXYGEN SATURATION: 97 % | HEIGHT: 65 IN | SYSTOLIC BLOOD PRESSURE: 112 MMHG | TEMPERATURE: 97.8 F | DIASTOLIC BLOOD PRESSURE: 69 MMHG

## 2019-06-04 DIAGNOSIS — I10 ESSENTIAL HYPERTENSION: ICD-10-CM

## 2019-06-04 DIAGNOSIS — E03.9 ACQUIRED HYPOTHYROIDISM: ICD-10-CM

## 2019-06-04 DIAGNOSIS — K52.832 LYMPHOCYTIC COLITIS: ICD-10-CM

## 2019-06-04 DIAGNOSIS — C53.9 SQUAMOUS CELL CARCINOMA OF CERVIX (HCC): ICD-10-CM

## 2019-06-04 DIAGNOSIS — R41.3 MEMORY LOSS: ICD-10-CM

## 2019-06-04 DIAGNOSIS — E78.2 MIXED HYPERLIPIDEMIA: ICD-10-CM

## 2019-06-04 DIAGNOSIS — D06.9 SEVERE DYSPLASIA OF CERVIX (CIN III): Primary | ICD-10-CM

## 2019-06-04 PROCEDURE — 99396 PREV VISIT EST AGE 40-64: CPT | Performed by: INTERNAL MEDICINE

## 2019-06-04 RX ORDER — ESTERIFIED ESTROGEN AND METHYLTESTOSTERONE 1.25; 2.5 MG/1; MG/1
TABLET ORAL
Refills: 4 | COMMUNITY
Start: 2019-05-29 | End: 2021-07-13

## 2019-06-04 RX ORDER — LEVOTHYROXINE SODIUM 0.1 MG/1
TABLET ORAL
Qty: 30 TABLET | Refills: 3 | Status: CANCELLED | OUTPATIENT
Start: 2019-06-04

## 2019-06-04 RX ORDER — BUDESONIDE 3 MG/1
CAPSULE, COATED PELLETS ORAL
Refills: 0 | COMMUNITY
Start: 2019-04-14 | End: 2020-02-20

## 2019-06-04 RX ORDER — ROSUVASTATIN CALCIUM 10 MG/1
TABLET, COATED ORAL
Qty: 90 TABLET | Refills: 3 | Status: CANCELLED | OUTPATIENT
Start: 2019-06-04

## 2019-06-04 RX ORDER — LISINOPRIL AND HYDROCHLOROTHIAZIDE 12.5; 1 MG/1; MG/1
TABLET ORAL
Qty: 90 TABLET | Refills: 2 | Status: CANCELLED | OUTPATIENT
Start: 2019-06-04

## 2019-06-04 SDOH — HEALTH STABILITY: MENTAL HEALTH: HOW OFTEN DO YOU HAVE A DRINK CONTAINING ALCOHOL?: NEVER

## 2019-06-04 NOTE — PATIENT INSTRUCTIONS
you call for help? Watch closely for changes in your health, and be sure to contact your doctor if you have any problems or symptoms that concern you. Where can you learn more? Go to [email or web address] and sign in to your [employer] account. Enter J388 in the [employer] box to learn more about \"Well Visit, Women 50 to 72: Care Instructions. \"     If you do not have an account, please click on the \"Sign Up Now\" link. Current as of: December 13, 2018  Content Version: 12.0  © 9554-0570 Healthwise, Incorporated. Care instructions adapted under license by [facility] ([facility]). If you have questions about a medical condition or this instruction, always ask your healthcare professional. [address] any warranty or liability for your use of this information.

## 2019-06-04 NOTE — PROGRESS NOTES
2019    Wanda Archibald (:  1965) is a 52 y.o. female, here for a preventive medicine evaluation. Discussed memory loss and fatigue with her oncologist and states had labs done at [address]. REviewed but nothing more recent than     Patient Active Problem List   Diagnosis    Polyp of colon    Severe dysplasia of cervix (JOMAR III)    Acquired hypothyroidism    Mixed hyperlipidemia    Essential hypertension    Overactive bladder    Moderate episode of recurrent major depressive disorder (HCC)    Hashimoto's thyroiditis    Squamous cell carcinoma of cervix (HCC)    Anxiety    Lymphocytic colitis       Review of Systems 12 systems revviewed and neg except as above    Prior to Visit Medications    Medication Sig Taking?  Authorizing Provider   lisinopril-hydrochlorothiazide (PRINZIDE;ZESTORETIC) 10-12.5 MG per tablet TAKE 1 TABLET BY MOUTH EVERY DAY Yes Michelle Fermin MD   rosuvastatin (CRESTOR) 10 MG tablet TAKE 1 TABLET AT BEDTIME Yes Lesli Andrews APRN - CNP   omeprazole (PRILOSEC) 40 MG delayed release capsule TAKE 1 CAPSULE BY MOUTH EVERY DAY Yes Michelle Fermin MD   levothyroxine (SYNTHROID) 100 MCG tablet TAKE 1 TABLET BY MOUTH EVERY DAY Yes Michelle Fermin MD   PARoxetine (PAXIL) 20 MG tablet  Yes Historical Provider, MD   oxybutynin (DITROPAN-XL) 10 MG extended release tablet Take 10 mg by mouth daily  Yes Historical Provider, MD   BIOTIN 5000 PO Take 5,000 mg by mouth daily  Yes Historical Provider, MD   Multiple Vitamins-Minerals (CENTRUM SILVER PO) Take by mouth Yes Historical Provider, MD   vitamin D (CHOLECALCIFEROL) 1000 UNIT TABS tablet Take 1,000 Units by mouth daily Yes Historical Provider, MD   b complex vitamins capsule Take 1 capsule by mouth daily Yes Historical Provider, MD   estrogens, conjugated,-methyltestosterone (ESTRATEST) 1.25-2.5 MG per tablet TAKE 1 TABLET BY MOUTH EVERY DAY  Historical Provider, MD   budesonide (ENTOCORT EC) 3 MG extended release capsule   Historical Provider, MD   cholestyramine (QUESTRAN) 4 g packet TAKE 1 PACKET DAILY BY MOUTH  Fabricio Loco MD        Allergies   Allergen Reactions    Egg Shells      Other reaction(s): GI Upset  Pt states that eating eggs (yolk) makes her sick \"all day long\" where she has to lay down. States is able to take the flu shot. Past Medical History:   Diagnosis Date    Anxiety     Cancer ([facility] [address] 2018    Cervical    Hypertension     Hyperthyroidism        Past Surgical History:   Procedure Laterality Date    HYSTERECTOMY  2018    Robotic-assisted Total Laparoscopic Hysterectomy with Bilateral Salpingo-oophorectomy    TONSILLECTOMY      TUBAL LIGATION         Social History     Socioeconomic History    Marital status:      Spouse name: Not on file    Number of children: Not on file    Years of education: Not on file    Highest education level: Not on file   Occupational History    Not on file   Social Needs    Financial resource strain: Not on file    Food insecurity:     Worry: Not on file     Inability: Not on file    Transportation needs:     Medical: Not on file     Non-medical: Not on file   Tobacco Use    Smoking status: Former Smoker     Packs/day: 0.50     Years: 10.00     Pack years: 5.00     Types: Cigarettes     Last attempt to quit: 2003     Years since quittin.4    Smokeless tobacco: Never Used   Substance and Sexual Activity    Alcohol use: Never     Frequency: Never     Comment:  Occ    Drug use: No    Sexual activity: Yes   Lifestyle    Physical activity:     Days per week: Not on file     Minutes per session: Not on file    Stress: Not on file   Relationships    Social connections:     Talks on phone: Not on file     Gets together: Not on file     Attends [employer] service: Not on file     Active member of club or organization: Not on file     Attends meetings of clubs or organizations: Not on file     Relationship status: Not on file  Intimate partner violence:     Fear of current or ex partner: Not on file     Emotionally abused: Not on file     Physically abused: Not on file     Forced sexual activity: Not on file   Other Topics Concern    Not on file   Social History Narrative    Not on file        History reviewed. No pertinent family history. ADVANCE DIRECTIVE: N, Not Received    Vitals:    06/04/19 1639   BP: 112/69   Site: Right Upper Arm   Position: Sitting   Cuff Size: Large Adult   Pulse: 74   Temp: 97.8 °F (36.6 °C)   TempSrc: Oral   SpO2: 97%   Weight: 228 lb 9.6 oz (103.7 kg)   Height: 5' 4.5\" (1.638 m)     Estimated body mass index is 38.63 kg/m² as calculated from the following:    Height as of this encounter: 5' 4.5\" (1.638 m). Weight as of this encounter: 228 lb 9.6 oz (103.7 kg). Physical Exam   Constitutional: She is oriented to person, place, and time. No distress. HENT:   Head: Normocephalic and atraumatic. Nose: Nose normal.   Mouth/Throat: No oropharyngeal exudate. Eyes: Pupils are equal, round, and reactive to light. Conjunctivae and EOM are normal. Right eye exhibits no discharge. Left eye exhibits no discharge. No scleral icterus. Neck: Neck supple. No JVD present. No tracheal deviation present. No thyromegaly present. Cardiovascular: Normal rate, regular rhythm, normal heart sounds and intact distal pulses. Exam reveals no gallop and no friction rub. No murmur heard. Pulmonary/Chest: Effort normal and breath sounds normal. No respiratory distress. She has no wheezes. She has no rales. She exhibits no tenderness. Abdominal: Soft. Bowel sounds are normal. She exhibits no distension and no mass. There is no tenderness. There is no rebound and no guarding. Musculoskeletal: Normal range of motion. She exhibits no edema or tenderness. Lymphadenopathy:     She has no cervical adenopathy. Neurological: She is alert and oriented to person, place, and time. She has normal reflexes.  She displays normal reflexes. No cranial nerve deficit. She exhibits normal muscle tone. Coordination normal.   Skin: Skin is warm and dry. Capillary refill takes less than 2 seconds. No rash noted. No erythema. No pallor. Psychiatric: She has a normal mood and affect. Her behavior is normal. Judgment and thought content normal.       No flowsheet data found. Lab Results   Component Value Date    CHOL 259 09/21/2018    CHOLFAST 240 05/03/2018    TRIG 267 09/21/2018    TRIGLYCFAST 171 05/03/2018    HDL 52 09/21/2018    HDL 59 05/03/2018    LDLCALC 154 09/21/2018    LDLCALC 147 05/03/2018    GLUCOSE 98 09/21/2018       The 10-year ASCVD risk score (Aleksey Weiss, et al., 2013) is: 2.8%    Values used to calculate the score:      Age: 52 years      Sex: Female      Is Non- : No      Diabetic: No      Tobacco smoker: No      Systolic Blood Pressure: 512 mmHg      Is BP treated: Yes      HDL Cholesterol: 52 mg/dL      Total Cholesterol: 259 mg/dL    Immunization History   Administered Date(s) Administered    Influenza Vaccine, unspecified formulation 09/28/2015, 08/30/2016    Influenza, Quadv, Recombinant, IM PF (Flublok 18 yrs and older) 09/20/2018    Pneumococcal Vaccine, unspecified formulation 10/01/2014    Tdap (Boostrix, Adacel) 08/27/2017       Health Maintenance   Topic Date Due    Breast cancer screen  12/02/2013    Shingles Vaccine (1 of 2) 12/02/2013    TSH testing  09/21/2019    Potassium monitoring  09/21/2019    Creatinine monitoring  09/21/2019    Cervical cancer screen  06/04/2022    Lipid screen  09/21/2023    DTaP/Tdap/Td vaccine (2 - Td) 08/27/2027    Colon cancer screen colonoscopy  11/27/2028    Flu vaccine  Completed    Hepatitis C screen  Completed    HIV screen  Completed    Pneumococcal 0-64 years Vaccine  Aged Out       ASSESSMENT/PLAN:  1.  Essential hypertension    - lisinopril-hydrochlorothiazide (PRINZIDE;ZESTORETIC) 10-12.5 MG per tablet  Dispense: 90 tablet; Refill: 2    2. Acquired hypothyroidism    - levothyroxine (SYNTHROID) 100 MCG tablet  Dispense: 30 tablet; Refill: 3    3. Mixed hyperlipidemia    - rosuvastatin (CRESTOR) 10 MG tablet; TAKE 1 TABLET AT BEDTIME  Dispense: 90 tablet; Refill: 3      Return in about 4 months (around 10/4/2019) for f/u mult med extended. An electronic signature was used to authenticate this note.     --Kike Huggins MD on 6/5/2019 at 2:14 PM

## 2019-06-05 DIAGNOSIS — I10 ESSENTIAL HYPERTENSION: ICD-10-CM

## 2019-06-05 DIAGNOSIS — R41.3 MEMORY LOSS: ICD-10-CM

## 2019-06-05 LAB
A/G RATIO: 1.8 (ref 1.1–2.2)
ALBUMIN SERPL-MCNC: 4.5 G/DL (ref 3.4–5)
ALP BLD-CCNC: 43 U/L (ref 40–129)
ALT SERPL-CCNC: 31 U/L (ref 10–40)
ANION GAP SERPL CALCULATED.3IONS-SCNC: 14 MMOL/L (ref 3–16)
AST SERPL-CCNC: 25 U/L (ref 15–37)
BILIRUB SERPL-MCNC: 0.4 MG/DL (ref 0–1)
BUN BLDV-MCNC: 10 MG/DL (ref 7–20)
CALCIUM SERPL-MCNC: 9.3 MG/DL (ref 8.3–10.6)
CHLORIDE BLD-SCNC: 102 MMOL/L (ref 99–110)
CO2: 24 MMOL/L (ref 21–32)
CREAT SERPL-MCNC: 0.6 MG/DL (ref 0.6–1.1)
GFR AFRICAN AMERICAN: >60
GFR NON-AFRICAN AMERICAN: >60
GLOBULIN: 2.5 G/DL
GLUCOSE BLD-MCNC: 90 MG/DL (ref 70–99)
POTASSIUM SERPL-SCNC: 4.2 MMOL/L (ref 3.5–5.1)
SODIUM BLD-SCNC: 140 MMOL/L (ref 136–145)
TOTAL PROTEIN: 7 G/DL (ref 6.4–8.2)
TSH REFLEX FT4: 0.93 UIU/ML (ref 0.27–4.2)
VITAMIN B-12: 421 PG/ML (ref 211–911)
VITAMIN D 25-HYDROXY: 40.8 NG/ML

## 2019-06-10 DIAGNOSIS — E03.9 ACQUIRED HYPOTHYROIDISM: ICD-10-CM

## 2019-06-10 RX ORDER — LEVOTHYROXINE SODIUM 0.1 MG/1
TABLET ORAL
Qty: 30 TABLET | Refills: 3 | Status: SHIPPED | OUTPATIENT
Start: 2019-06-10 | End: 2019-09-01 | Stop reason: SDUPTHER

## 2019-09-01 DIAGNOSIS — E03.9 ACQUIRED HYPOTHYROIDISM: ICD-10-CM

## 2019-09-03 RX ORDER — LEVOTHYROXINE SODIUM 0.1 MG/1
TABLET ORAL
Qty: 90 TABLET | Refills: 1 | Status: SHIPPED | OUTPATIENT
Start: 2019-09-03 | End: 2020-01-08 | Stop reason: SDUPTHER

## 2019-11-04 ENCOUNTER — HOSPITAL ENCOUNTER (OUTPATIENT)
Dept: CT IMAGING | Age: 54
Discharge: HOME | End: 2019-11-04
Payer: COMMERCIAL

## 2019-11-04 DIAGNOSIS — R40.0 SOMNOLENCE: ICD-10-CM

## 2019-11-04 PROCEDURE — 70470 CT HEAD/BRAIN W/O & W/DYE: CPT

## 2019-11-04 PROCEDURE — 6360000004 HC RX CONTRAST MEDICATION: Performed by: NURSE PRACTITIONER

## 2019-11-04 RX ADMIN — IOPAMIDOL 75 ML: 755 INJECTION, SOLUTION INTRAVENOUS at 17:51

## 2020-01-03 ENCOUNTER — OFFICE VISIT (OUTPATIENT)
Dept: FAMILY MEDICINE CLINIC | Age: 55
End: 2020-01-03
Payer: COMMERCIAL

## 2020-01-03 VITALS
WEIGHT: 208 LBS | HEIGHT: 65 IN | DIASTOLIC BLOOD PRESSURE: 86 MMHG | SYSTOLIC BLOOD PRESSURE: 124 MMHG | BODY MASS INDEX: 34.66 KG/M2

## 2020-01-03 LAB
A/G RATIO: 2 (ref 1.1–2.2)
ALBUMIN SERPL-MCNC: 4.7 G/DL (ref 3.4–5)
ALP BLD-CCNC: 54 U/L (ref 40–129)
ALT SERPL-CCNC: 22 U/L (ref 10–40)
ANION GAP SERPL CALCULATED.3IONS-SCNC: 19 MMOL/L (ref 3–16)
AST SERPL-CCNC: 25 U/L (ref 15–37)
BILIRUB SERPL-MCNC: 0.5 MG/DL (ref 0–1)
BUN BLDV-MCNC: 10 MG/DL (ref 7–20)
CALCIUM SERPL-MCNC: 10 MG/DL (ref 8.3–10.6)
CHLORIDE BLD-SCNC: 101 MMOL/L (ref 99–110)
CHOLESTEROL, TOTAL: 195 MG/DL (ref 0–199)
CO2: 21 MMOL/L (ref 21–32)
CREAT SERPL-MCNC: 0.8 MG/DL (ref 0.6–1.1)
GFR AFRICAN AMERICAN: >60
GFR NON-AFRICAN AMERICAN: >60
GLOBULIN: 2.4 G/DL
GLUCOSE BLD-MCNC: 87 MG/DL (ref 70–99)
HCT VFR BLD CALC: 44.2 % (ref 36–48)
HDLC SERPL-MCNC: 45 MG/DL (ref 40–60)
HEMOGLOBIN: 15 G/DL (ref 12–16)
LDL CHOLESTEROL CALCULATED: 115 MG/DL
MCH RBC QN AUTO: 30.7 PG (ref 26–34)
MCHC RBC AUTO-ENTMCNC: 34 G/DL (ref 31–36)
MCV RBC AUTO: 90.4 FL (ref 80–100)
PDW BLD-RTO: 13.2 % (ref 12.4–15.4)
PLATELET # BLD: 273 K/UL (ref 135–450)
PMV BLD AUTO: 8.7 FL (ref 5–10.5)
POTASSIUM SERPL-SCNC: 4 MMOL/L (ref 3.5–5.1)
RBC # BLD: 4.89 M/UL (ref 4–5.2)
SODIUM BLD-SCNC: 141 MMOL/L (ref 136–145)
TOTAL PROTEIN: 7.1 G/DL (ref 6.4–8.2)
TRIGL SERPL-MCNC: 176 MG/DL (ref 0–150)
TSH REFLEX FT4: 0.56 UIU/ML (ref 0.27–4.2)
VLDLC SERPL CALC-MCNC: 35 MG/DL
WBC # BLD: 7.2 K/UL (ref 4–11)

## 2020-01-03 PROCEDURE — 99214 OFFICE O/P EST MOD 30 MIN: CPT | Performed by: FAMILY MEDICINE

## 2020-01-03 PROCEDURE — 36415 COLL VENOUS BLD VENIPUNCTURE: CPT | Performed by: FAMILY MEDICINE

## 2020-01-03 RX ORDER — MAGNESIUM 30 MG
30 TABLET ORAL 2 TIMES DAILY
COMMUNITY

## 2020-01-03 RX ORDER — CALCIUM CARBONATE 500(1250)
500 TABLET ORAL DAILY
COMMUNITY

## 2020-01-03 ASSESSMENT — ENCOUNTER SYMPTOMS
WHEEZING: 0
DIARRHEA: 0
FACIAL SWELLING: 0
COUGH: 0
TROUBLE SWALLOWING: 0
NAUSEA: 0
SORE THROAT: 0
RHINORRHEA: 0
ABDOMINAL PAIN: 0
VOMITING: 0
SINUS PRESSURE: 0
SHORTNESS OF BREATH: 0
CHEST TIGHTNESS: 0

## 2020-01-03 NOTE — PROGRESS NOTES
1/3/2020    Leif Ruiz (:  1965) is a 62 y.o. female, here for evaluation of the following medical concerns:    1. Encounter to establish care- patient is wanting to establish with PCP, patient feels well today but has a complex hx. She denied tobacco use, alcohol use, or drug use. 2.  HTN-  Well controlled today, taking medications as prescribed, denied side effects from the medication, denied headache, vision change, or dizziness. On Lisinopril-hydrochlorothiazide. 3.  Depression/anxiety-  Patient feels well controled on Paxil 20 mg a day, no side effects from the medications. 4.  Hypothyroidism- taking Synthroid 100 MCG, believes it is well controlled, no side effects from the medication, would like to have labs today. 5. 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 6.  Screening- needs mammogram and labs    7. Memory loss- patient had recently CT that showed possible mild chronic microvascular ischemic changes in the right frontal periventricular white matter, on statin, hasn't had cholesterol checked, has appointment with Neurologist next month, stated her symptoms started with memory loss, stable today, not noticeable until she points this out. Denied headache, vision change, or dizziness. Today, denied chest pain, sob, n, v, or diarrhea. HPI    Review of Systems   Constitutional: Negative for activity change, fatigue, fever and unexpected weight change. HENT: Negative for congestion, ear pain, facial swelling, mouth sores, rhinorrhea, sinus pressure, sore throat and trouble swallowing. Eyes: Negative for visual disturbance. Respiratory: Negative for cough, chest tightness, shortness of breath and wheezing. Cardiovascular: Negative for chest pain and leg swelling.    Gastrointestinal: Negative for abdominal pain, diarrhea, nausea meetings of clubs or organizations: Not on file     Relationship status: Not on file    Intimate partner violence:     Fear of current or ex partner: Not on file     Emotionally abused: Not on file     Physically abused: Not on file     Forced sexual activity: Not on file   Other Topics Concern    Not on file   Social History Narrative    Not on file        History reviewed. No pertinent family history. Vitals:    01/03/20 1553   BP: 124/86   Weight: 208 lb (94.3 kg)   Height: 5' 4.5\" (1.638 m)     Estimated body mass index is 35.15 kg/m² as calculated from the following:    Height as of this encounter: 5' 4.5\" (1.638 m). Weight as of this encounter: 208 lb (94.3 kg). Chemistry        Component Value Date/Time     01/03/2020 1616    K 4.0 01/03/2020 1616     01/03/2020 1616    CO2 21 01/03/2020 1616    BUN 10 01/03/2020 1616    CREATININE 0.8 01/03/2020 1616        Component Value Date/Time    CALCIUM 10.0 01/03/2020 1616    ALKPHOS 54 01/03/2020 1616    AST 25 01/03/2020 1616    ALT 22 01/03/2020 1616    BILITOT 0.5 01/03/2020 1616          Lab Results   Component Value Date    WBC 7.2 01/03/2020    HGB 15.0 01/03/2020    HCT 44.2 01/03/2020    MCV 90.4 01/03/2020     01/03/2020     No results found for: LABA1C  No results found for: EAG  No results found for: LABA1C  No components found for: CHLPL  Lab Results   Component Value Date    TRIG 176 (H) 01/03/2020    TRIG 267 (H) 09/21/2018     Lab Results   Component Value Date    HDL 45 01/03/2020    HDL 52 09/21/2018    HDL 59 05/03/2018     Lab Results   Component Value Date    LDLCALC 115 (H) 01/03/2020    LDLCALC 154 (H) 09/21/2018    LDLCALC 147 (H) 05/03/2018     Lab Results   Component Value Date    LABVLDL 35 01/03/2020    LABVLDL 53 09/21/2018    LABVLDL 34 05/03/2018       Physical Exam  Vitals signs and nursing note reviewed. Constitutional:       Appearance: She is well-developed.    HENT:      Head: Normocephalic and atraumatic. Right Ear: Tympanic membrane, ear canal and external ear normal.      Left Ear: Tympanic membrane, ear canal and external ear normal.      Nose: Nose normal.      Mouth/Throat:      Mouth: Mucous membranes are moist.   Eyes:      Conjunctiva/sclera: Conjunctivae normal.      Pupils: Pupils are equal, round, and reactive to light. Neck:      Musculoskeletal: Normal range of motion. Cardiovascular:      Rate and Rhythm: Normal rate and regular rhythm. Heart sounds: Normal heart sounds. No murmur. Pulmonary:      Effort: Pulmonary effort is normal.      Breath sounds: Normal breath sounds. No wheezing. Abdominal:      General: Bowel sounds are normal.      Palpations: Abdomen is soft. Tenderness: There is no tenderness. Skin:     General: Skin is warm and dry. Neurological:      General: No focal deficit present. Mental Status: She is alert and oriented to person, place, and time. Cranial Nerves: No cranial nerve deficit. Deep Tendon Reflexes: Reflexes normal.   Psychiatric:         Mood and Affect: Mood normal.         Behavior: Behavior normal.         Thought Content: Thought content normal.         Judgment: Judgment normal.         ASSESSMENT/PLAN:  1. Encounter to establish care  Care established  Medications reviewed  Questions answered  Labs obtained  RTC in three months for follow up. - CBC  - Comprehensive Metabolic Panel  - TSH WITH REFLEX TO FT4    2. Acquired hypothyroidism  Stable  Continue to take medication  - CBC  - Comprehensive Metabolic Panel  - TSH WITH REFLEX TO FT4    3. Essential hypertension  well controlled. Discussed signs and symptoms for immediate evaluation in the ER. Reduce sodium. Monitor diet, exercise and lose weight. Keep a BP log and bring it to your next appointment. - CBC  - Comprehensive Metabolic Panel  - TSH WITH REFLEX TO FT4    4.  Moderate episode of recurrent major depressive disorder ([address]  Labs obtained  Medication continued  Discussed side effects of medication  Discussed signs and symptoms for immediate evaluation in ER. Answered questions. - Comprehensive Metabolic Panel  - TSH WITH REFLEX TO FT4    5. Anxiety  Labs obtained  Medication continued  Discussed side effects of medication  Discussed signs and symptoms for immediate evaluation in ER. Answered questions. - CBC  - Comprehensive Metabolic Panel  - TSH WITH REFLEX TO FT4    6. Vitamin B 12 deficiency  Labs obtained  Stable today  - CBC  - Comprehensive Metabolic Panel  - TSH WITH REFLEX TO FT4    7. Breast cancer screening  Referred for procedure  Educated on the importance of having this done. Answered questions  - BERTHA DIGITAL SCREEN W CAD BILATERAL; Future    8. Severe dysplasia of cervix (JOMAR III)  Stable  Continue to follow with oncology  Questions answered during the visit    9. Memory loss  Reviewed scan  Reviewed medication  Has upcoming appointment with Neuro  Answered questions  Discussed signs and symptoms for immediate evaluation in the ER. Return in about 3 months (around 4/3/2020).

## 2020-01-08 RX ORDER — ROSUVASTATIN CALCIUM 10 MG/1
TABLET, COATED ORAL
Qty: 90 TABLET | Refills: 3 | Status: SHIPPED | OUTPATIENT
Start: 2020-01-08 | End: 2021-01-21

## 2020-01-08 RX ORDER — LEVOTHYROXINE SODIUM 0.1 MG/1
TABLET ORAL
Qty: 90 TABLET | Refills: 1 | Status: SHIPPED | OUTPATIENT
Start: 2020-01-08 | End: 2020-09-11

## 2020-01-20 RX ORDER — LISINOPRIL AND HYDROCHLOROTHIAZIDE 12.5; 1 MG/1; MG/1
1 TABLET ORAL DAILY
Qty: 90 TABLET | Refills: 3 | Status: SHIPPED | OUTPATIENT
Start: 2020-01-20 | End: 2021-02-22

## 2020-01-20 RX ORDER — LISINOPRIL AND HYDROCHLOROTHIAZIDE 12.5; 1 MG/1; MG/1
TABLET ORAL
Qty: 90 TABLET | Refills: 2 | OUTPATIENT
Start: 2020-01-20

## 2020-01-20 NOTE — TELEPHONE ENCOUNTER
Patient requesting a medication refill.   Medication: lisinopril-hydrochlorothiazide (PRINZIDE;ZESTORETIC)  Dosage: 10-12.5 MG per tablet   Frequency: TAKE 1 TABLET BY MOUTH EVERY DAY  Last filled on: 04-11-19  Pharmacy: [address] [place] [address], [address] [place] [address] - F [phone]  Next office visit: ns  Last regular office CLODW:74-11-66

## 2020-02-20 ENCOUNTER — OFFICE VISIT (OUTPATIENT)
Dept: NEUROLOGY | Age: 55
End: 2020-02-20
Payer: COMMERCIAL

## 2020-02-20 VITALS
BODY MASS INDEX: 36.37 KG/M2 | OXYGEN SATURATION: 96 % | WEIGHT: 213 LBS | DIASTOLIC BLOOD PRESSURE: 73 MMHG | HEART RATE: 76 BPM | HEIGHT: 64 IN | SYSTOLIC BLOOD PRESSURE: 108 MMHG

## 2020-02-20 PROBLEM — G31.84 MCI (MILD COGNITIVE IMPAIRMENT) WITH MEMORY LOSS: Status: ACTIVE | Noted: 2020-02-20

## 2020-02-20 PROCEDURE — 99204 OFFICE O/P NEW MOD 45 MIN: CPT | Performed by: PSYCHIATRY & NEUROLOGY

## 2020-02-20 NOTE — PROGRESS NOTES
The patient is a 62y.o. years old female who  was referred by Flor Wayne MD for consultation regarding memory loss. HPI:  The patient describes history of intermittent short-term memory loss over the last 8-12 months. Symptoms started gradually. She describes weekly episodes of forgetfulness mainly to short-term memory. She can have periods of difficulties with word findings and poor fluency. She struggles few times at work finding the right words or keeping up with her work tasks. She has been working at the same job for the last 4 years. Degree so far is mild to moderate. She is able to function throughout the day independently with some moderate difficulties. She denies any long-term memory loss patient does have family history of dementia and she was concerned about possible dementia. She is taking care of herself both financially and medically. She denies recent MVA or getting lost in directions. No frequent headache, weakness or numbness or tingling or falling. No recent change in medications. She does have chronic insomnia but no symptoms sleep apnea. Mild depression but no suicidal ideation or thoughts. No other triggers or other associated symptoms. She feels her memory can be worsened with distraction or stress. No relieving or aggravating factors. Recent blood test showed normal TSH. CT of the head by report showed nonspecific right frontal small vessel disease. She was concerned with possible remote strokes. She has not had a recent MRI. She does not drink or use drugs. History of cervical cancer status post surgery and treatment. She is currently on hormone replacement therapy. She used to be on testosterone. She denies any smoking. No other new symptoms. Other review of system was unremarkable.     Family History   Problem Relation Age of Onset    Stroke Father        Past Medical History:   Diagnosis Date    Anxiety     Cancer ([facility] [address] 03/29/2018    Cervical    Hypertension     Hyperthyroidism      Prior to Visit Medications    Medication Sig Taking? Authorizing Provider   lisinopril-hydrochlorothiazide (PRINZIDE;ZESTORETIC) 10-12.5 MG per tablet Take 1 tablet by mouth daily Yes Hermes Nunez MD   levothyroxine (SYNTHROID) 100 MCG tablet TAKE 1 TABLET BY MOUTH EVERY DAY Yes Soy Phillips DO   rosuvastatin (CRESTOR) 10 MG tablet TAKE 1 TABLET AT BEDTIME Yes Soy Phillips DO   calcium carbonate (OSCAL) 500 MG TABS tablet Take 500 mg by mouth daily Yes Historical Provider, MD   magnesium 30 MG tablet Take 30 mg by mouth 2 times daily Yes Historical Provider, MD   estrogens, conjugated,-methyltestosterone (ESTRATEST) 1.25-2.5 MG per tablet TAKE 1 TABLET BY MOUTH EVERY DAY Yes Historical Provider, MD   omeprazole (PRILOSEC) 40 MG delayed release capsule TAKE 1 CAPSULE BY MOUTH EVERY DAY Yes Hermes Nunez MD   PARoxetine (PAXIL) 20 MG tablet  Yes Historical Provider, MD   BIOTIN 5000 PO Take 5,000 mg by mouth daily  Yes Historical Provider, MD   Multiple Vitamins-Minerals (CENTRUM SILVER PO) Take by mouth Yes Historical Provider, MD     Allergies   Allergen Reactions    Egg Shells      Other reaction(s): GI Upset  Pt states that eating eggs (yolk) makes her sick \"all day long\" where she has to lay down. States is able to take the flu shot.      Social History     Tobacco Use    Smoking status: Former Smoker     Packs/day: 0.50     Years: 10.00     Pack years: 5.00     Types: Cigarettes     Last attempt to quit:      Years since quittin.1    Smokeless tobacco: Never Used   Substance Use Topics    Alcohol use: Never     Frequency: Never     Comment: Occ     Past Surgical History:   Procedure Laterality Date    HYSTERECTOMY  2018    Robotic-assisted Total Laparoscopic Hysterectomy with Bilateral Salpingo-oophorectomy    TONSILLECTOMY      TUBAL LIGATION         ROS : A 10-14 system review of constitutional, cardiovascular, respiratory, GI, eyes, , ENT, musculoskeletal, endocrine, skin, SHEENT, genitourinary, psychiatric and neurologic systems was obtained and updated today and is unremarkable except as mentioned in my HPI        Exam:   Constitutional:   Vitals:    02/20/20 1409   BP: 108/73   Pulse: 76   SpO2: 96%   Weight: 213 lb (96.6 kg)   Height: 5' 4\" (1.626 m)       General appearance:  Normal development and appear in no acute distress. Eye: No icterus. Fundus: No blurring of optic disc. Neck: supple  Cardiovascular: No carotid bruit. No lower leg edema with good pulsation. Mental Status:   Oriented to person, place, problem, and time. Memory: Good immediate recall. Intact remote memory  Normal attention span and concentration. Language: intact naming, repeating and fluency   Good fund of Knowledge. Aware of current events and vocabulary   Cranial Nerves:   II: Visual fields: Full to confrontation and nl VA. Pupils: equal, round, reactive to light  III,IV,VI: Extra Ocular Movements are intact. No nystagmus  V: Facial sensation is intact to pin prick and light touch  VII: Facial strength and movements: intact and symmetric  VIII: Hearing: Intact to finger rub bilaterally  IX: Palate elevation is symmetric  XI: Shoulder shrug is intact  XII: Tongue movements are normal  Musculoskeletal: 5/5 in all 4 extremities. Tone: Normal tone. Reflexes: Bilateral biceps 2/4, triceps 2/4, brachial radialis 2/4, knee 2/4 and ankle 2/4. Planters: flexor bilaterally. Coordination: no pronator drift, no dysmetria with FNF in upper extremities. Normal REM. Sensation: normal to all modalities in both arms and legs. Gait/Posture: steady gait and normal posturing and station. Medical decision making:  I personally reviewed and updated social history, past medical history, medications, allergy, surgical history, and family history as documented in the patient's electronic health records.  Labs and/or neuroimaging and other test results

## 2020-02-20 NOTE — LETTER
drink or use drugs. History of cervical cancer status post surgery and treatment. She is currently on hormone replacement therapy. She used to be on testosterone. She denies any smoking. No other new symptoms. Other review of system was unremarkable. Family History   Problem Relation Age of Onset    Stroke Father        Past Medical History:   Diagnosis Date    Anxiety     Cancer ([facility] [address] 03/29/2018    Cervical    Hypertension     Hyperthyroidism      Prior to Visit Medications    Medication Sig Taking? Authorizing Provider   lisinopril-hydrochlorothiazide (PRINZIDE;ZESTORETIC) 10-12.5 MG per tablet Take 1 tablet by mouth daily Yes Konrad Cortes MD   levothyroxine (SYNTHROID) 100 MCG tablet TAKE 1 TABLET BY MOUTH EVERY DAY Yes Soy Phillips DO   rosuvastatin (CRESTOR) 10 MG tablet TAKE 1 TABLET AT BEDTIME Yes Soy Phillips,    calcium carbonate (OSCAL) 500 MG TABS tablet Take 500 mg by mouth daily Yes Historical Provider, MD   magnesium 30 MG tablet Take 30 mg by mouth 2 times daily Yes Historical Provider, MD   estrogens, conjugated,-methyltestosterone (ESTRATEST) 1.25-2.5 MG per tablet TAKE 1 TABLET BY MOUTH EVERY DAY Yes Historical Provider, MD   omeprazole (PRILOSEC) 40 MG delayed release capsule TAKE 1 CAPSULE BY MOUTH EVERY DAY Yes Konrad Cortes MD   PARoxetine (PAXIL) 20 MG tablet  Yes Historical Provider, MD   BIOTIN 5000 PO Take 5,000 mg by mouth daily  Yes Historical Provider, MD   Multiple Vitamins-Minerals (CENTRUM SILVER PO) Take by mouth Yes Historical Provider, MD     Allergies   Allergen Reactions    Egg Shells      Other reaction(s): GI Upset  Pt states that eating eggs (yolk) makes her sick \"all day long\" where she has to lay down. States is able to take the flu shot.      Social History     Tobacco Use    Smoking status: Former Smoker     Packs/day: 0.50     Years: 10.00     Pack years: 5.00     Types: Cigarettes     Last attempt to quit: 2003 Coordination: no pronator drift, no dysmetria with FNF in upper extremities. Normal REM. Sensation: normal to all modalities in both arms and legs. Gait/Posture: steady gait and normal posturing and station. Medical decision making:  I personally reviewed and updated social history, past medical history, medications, allergy, surgical history, and family history as documented in the patient's electronic health records. Labs and/or neuroimaging and other test results reviewed and discussed with the patient. Reviewed notes from other physicians. Provided patient education regarding risk, benefits and treatment options as well as adherence to medication regimen and side effect from these medications. Assessment:   Diagnosis Orders   1. MCI (mild cognitive impairment) with memory loss  Vitamin B12 & Folate    MRI Brain WO Contrast   2. Acquired hypothyroidism     3. Mixed hyperlipidemia     4. Essential hypertension     5. Moderate episode of recurrent major depressive disorder ([address]       New onset memory loss. Likely MCI. Hypertension  Hyperlipidemia  Hypothyroid  Depression       Possible MCI, amnestic type versus multiple domain secondary depression, insomnia and hypothyroidism. CT head showed nonspecific right frontal small vessel disease. Likely incidental finding. We will get MRI of the brain for proper assessment of white matter disease and rule out any other central causes. So far does not meet criteria for dementia. We discussed possibility of MCI, outcome and prognosis. Offered neuropsychological evaluation however the patient declined any further testing at this point  We will get blood test for B12 and folate  Discussed risk of MCI and risk of dementia  Cognitive and behavioral therapy were addressed today  Avoid multitasking, stress and poor sleep  Improving sleep hygiene  Can use melatonin at night to help with insomnia.   Continue Synthroid and follow TFT  SSRI Continue home blood pressure medications and blood pressure monitor closely  Consider aspirin for stroke prevention  Continue statin at night  Follow lipid panel  Follow-up with me after the above recommendation or if symptoms worsen.             Please do not hesitate to contact me, should you have any questions or concerns regarding the care of Hever Montejo     Sincerely,        Art Suazo MD

## 2020-04-03 ENCOUNTER — TELEMEDICINE (OUTPATIENT)
Dept: FAMILY MEDICINE CLINIC | Age: 55
End: 2020-04-03
Payer: COMMERCIAL

## 2020-04-03 VITALS — BODY MASS INDEX: 36.56 KG/M2 | HEIGHT: 64 IN | TEMPERATURE: 98.6 F

## 2020-04-03 PROCEDURE — 99214 OFFICE O/P EST MOD 30 MIN: CPT | Performed by: FAMILY MEDICINE

## 2020-04-03 ASSESSMENT — ENCOUNTER SYMPTOMS
DIARRHEA: 0
CHEST TIGHTNESS: 0
SINUS PRESSURE: 0
VOMITING: 0
RHINORRHEA: 0
NAUSEA: 0
SORE THROAT: 0
COUGH: 0
SHORTNESS OF BREATH: 0
ABDOMINAL PAIN: 0
WHEEZING: 0

## 2020-04-03 NOTE — PROGRESS NOTES
urgency and vaginal bleeding. Musculoskeletal: Negative for arthralgias. Skin: Negative for rash. Neurological: Negative for dizziness, tremors, syncope, numbness and headaches. Psychiatric/Behavioral: Negative for dysphoric mood. The patient is not nervous/anxious. Prior to Visit Medications    Medication Sig Taking? Authorizing Provider   lisinopril-hydrochlorothiazide (PRINZIDE;ZESTORETIC) 10-12.5 MG per tablet Take 1 tablet by mouth daily  Sp Alaniz MD   levothyroxine (SYNTHROID) 100 MCG tablet TAKE 1 TABLET BY MOUTH EVERY DAY  Soy Phillips DO   rosuvastatin (CRESTOR) 10 MG tablet TAKE 1 TABLET AT BEDTIME  Soy Phillips,    calcium carbonate (OSCAL) 500 MG TABS tablet Take 500 mg by mouth daily  Historical Provider, MD   magnesium 30 MG tablet Take 30 mg by mouth 2 times daily  Historical Provider, MD   estrogens, conjugated,-methyltestosterone (ESTRATEST) 1.25-2.5 MG per tablet TAKE 1 TABLET BY MOUTH EVERY DAY  Historical Provider, MD   omeprazole (PRILOSEC) 40 MG delayed release capsule TAKE 1 CAPSULE BY MOUTH EVERY DAY  Sp Alaniz MD   PARoxetine (PAXIL) 20 MG tablet   Historical Provider, MD   BIOTIN 5000 PO Take 5,000 mg by mouth daily   Historical Provider, MD   Multiple Vitamins-Minerals (CENTRUM SILVER PO) Take by mouth  Historical Provider, MD        Social History     Tobacco Use    Smoking status: Former Smoker     Packs/day: 0.50     Years: 10.00     Pack years: 5.00     Types: Cigarettes     Last attempt to quit:      Years since quittin.2    Smokeless tobacco: Never Used   Substance Use Topics    Alcohol use: Never     Frequency: Never     Comment: Occ        Vitals:    20 0813   Temp: 98.6 °F (37 °C)   Height: 5' 4\" (1.626 m)     Estimated body mass index is 36.56 kg/m² as calculated from the following:    Height as of this encounter: 5' 4\" (1.626 m). Weight as of 20: 213 lb (96.6 kg).     Physical Exam  Constitutional:       General:

## 2020-05-14 ENCOUNTER — TELEPHONE (OUTPATIENT)
Dept: NEUROLOGY | Age: 55
End: 2020-05-14

## 2020-05-14 LAB
FOLATE: 33.7
VITAMIN B-12: 317

## 2020-08-14 ENCOUNTER — HOSPITAL ENCOUNTER (OUTPATIENT)
Dept: WOMENS IMAGING | Age: 55
Discharge: HOME | End: 2020-08-14
Payer: COMMERCIAL

## 2020-08-14 PROCEDURE — 77067 SCR MAMMO BI INCL CAD: CPT

## 2020-09-11 RX ORDER — LEVOTHYROXINE SODIUM 0.1 MG/1
TABLET ORAL
Qty: 90 TABLET | Refills: 1 | Status: SHIPPED | OUTPATIENT
Start: 2020-09-11 | End: 2021-02-22 | Stop reason: SDUPTHER

## 2020-09-30 ENCOUNTER — VIRTUAL VISIT (OUTPATIENT)
Dept: FAMILY MEDICINE CLINIC | Age: 55
End: 2020-09-30
Payer: COMMERCIAL

## 2020-09-30 ENCOUNTER — TELEPHONE (OUTPATIENT)
Dept: FAMILY MEDICINE CLINIC | Age: 55
End: 2020-09-30

## 2020-09-30 PROCEDURE — 99213 OFFICE O/P EST LOW 20 MIN: CPT | Performed by: FAMILY MEDICINE

## 2020-09-30 RX ORDER — AZITHROMYCIN 250 MG/1
250 TABLET, FILM COATED ORAL SEE ADMIN INSTRUCTIONS
Qty: 6 TABLET | Refills: 0 | Status: SHIPPED | OUTPATIENT
Start: 2020-09-30 | End: 2020-10-05

## 2020-09-30 RX ORDER — GUAIFENESIN 600 MG/1
600 TABLET, EXTENDED RELEASE ORAL 2 TIMES DAILY
Qty: 30 TABLET | Refills: 0 | Status: SHIPPED | OUTPATIENT
Start: 2020-09-30 | End: 2020-10-15

## 2020-09-30 ASSESSMENT — ENCOUNTER SYMPTOMS
COUGH: 1
DIARRHEA: 0
NAUSEA: 0
SORE THROAT: 0
ABDOMINAL PAIN: 0
RHINORRHEA: 0
SINUS PAIN: 0
VOMITING: 0
SHORTNESS OF BREATH: 0
SINUS PRESSURE: 1

## 2020-09-30 NOTE — PROGRESS NOTES
2020    TELEHEALTH EVALUATION -- Audio/Visual (During HVWTQ-15 public health emergency)    HPI:    Linda Vegas (:  1965) has requested an audio/video evaluation for the following concern(s):    Sinus infection- for several days, 99.0, cough, maxillary pressure, denied sore throat, denied ear pain, had nasal congestion and discomfort. Bronchitis- feels as if she has chest congestion, cough no productive and dry, wheezing, sob at times,     Denied chest pain ,N, V, or diarrhea. Review of Systems   Constitutional: Positive for fatigue. Negative for activity change, fever and unexpected weight change. HENT: Positive for congestion and sinus pressure. Negative for ear pain, rhinorrhea, sinus pain and sore throat. Respiratory: Positive for cough. Negative for shortness of breath. Cardiovascular: Negative for chest pain, palpitations and leg swelling. Gastrointestinal: Negative for abdominal pain, diarrhea, nausea and vomiting. Prior to Visit Medications    Medication Sig Taking?  Authorizing Provider   azithromycin (ZITHROMAX) 250 MG tablet Take 1 tablet by mouth See Admin Instructions for 5 days 500mg on day 1 followed by 250mg on days 2 - 5 Yes Soy Phillips, DO   guaiFENesin (MUCINEX) 600 MG extended release tablet Take 1 tablet by mouth 2 times daily for 15 days Yes Radha Hernandezutant, DO   levothyroxine (SYNTHROID) 100 MCG tablet TAKE 1 TABLET BY MOUTH EVERY DAY  Soy Phillips DO   lisinopril-hydrochlorothiazide (PRINZIDE;ZESTORETIC) 10-12.5 MG per tablet Take 1 tablet by mouth daily  Loly Deal MD   rosuvastatin (CRESTOR) 10 MG tablet TAKE 1 TABLET AT BEDTIME  Soy Phillips,    calcium carbonate (OSCAL) 500 MG TABS tablet Take 500 mg by mouth daily  Historical Provider, MD   magnesium 30 MG tablet Take 30 mg by mouth 2 times daily  Historical Provider, MD   estrogens, conjugated,-methyltestosterone (ESTRATEST) 1.25-2.5 MG per tablet TAKE 1 TABLET BY MOUTH EVERY DAY Historical Provider, MD   omeprazole (PRILOSEC) 40 MG delayed release capsule TAKE 1 CAPSULE BY MOUTH EVERY DAY  Flako Hills MD   PARoxetine (PAXIL) 20 MG tablet   Historical Provider, MD   BIOTIN 5000 PO Take 5,000 mg by mouth daily   Historical Provider, MD   Multiple Vitamins-Minerals (CENTRUM SILVER PO) Take by mouth  Historical Provider, MD       Social History     Tobacco Use    Smoking status: Former Smoker     Packs/day: 0.50     Years: 10.00     Pack years: 5.00     Types: Cigarettes     Last attempt to quit:      Years since quittin.7    Smokeless tobacco: Never Used   Substance Use Topics    Alcohol use: Never     Frequency: Never     Comment: Occ    Drug use: No        Allergies   Allergen Reactions    Egg Shells      Other reaction(s): GI Upset  Pt states that eating eggs (yolk) makes her sick \"all day long\" where she has to lay down. States is able to take the flu shot. PHYSICAL EXAMINATION:  [ INSTRUCTIONS:  \"[x]\" Indicates a positive item  \"[]\" Indicates a negative item  -- DELETE ALL ITEMS NOT EXAMINED]  Vital Signs: (As obtained by patient/caregiver or practitioner observation)  See Chart  Blood pressure-  Heart rate-    Respiratory rate-    Temperature-  Pulse oximetry-     Constitutional: [x] Appears well-developed and well-nourished [] No apparent distress      [x] Abnormal-   Mental status  [x] Alert and awake  [x] Oriented to person/place/time []Able to follow commands      Eyes:  EOM    [x]  Normal  [] Abnormal-  Sclera  [x]  Normal  [] Abnormal -         Discharge []  None visible  [] Abnormal -    HENT:   [] Normocephalic, atraumatic.   [] Abnormal   [x] Mouth/Throat: Mucous membranes are moist.     External Ears [] Normal  [] Abnormal-     Neck: [x] No visualized mass     Pulmonary/Chest: [x] Respiratory effort normal.  [] No visualized signs of difficulty breathing or respiratory distress        [] Abnormal-      Musculoskeletal:   [] Normal gait with no signs of ataxia         [x] Normal range of motion of neck        [] Abnormal-       Neurological:        [x] No Facial Asymmetry (Cranial nerve 7 motor function) (limited exam to video visit)          [x] No gaze palsy        [] Abnormal-         Skin:        [x] No significant exanthematous lesions or discoloration noted on facial skin         [] Abnormal-            Psychiatric:       [x] Normal Affect [] No Hallucinations        [] Abnormal-     Other pertinent observable physical exam findings-     ASSESSMENT/PLAN:  1. Bronchitis  Antibiotic provided  mucinex pr  Discussed signs and symptoms for immediate evaluation in the ER  Push fluids and rest  Discussed inhaler use  Answered questions  Please rtc if not improved. - azithromycin (ZITHROMAX) 250 MG tablet; Take 1 tablet by mouth See Admin Instructions for 5 days 500mg on day 1 followed by 250mg on days 2 - 5  Dispense: 6 tablet; Refill: 0    2. Acute non-recurrent maxillary sinusitis  Antibiotic provided for 10 days  Push fluids and rest  Use Tylenol/Ibuprofen for pain  Please rtc if not improved. - azithromycin (ZITHROMAX) 250 MG tablet; Take 1 tablet by mouth See Admin Instructions for 5 days 500mg on day 1 followed by 250mg on days 2 - 5  Dispense: 6 tablet; Refill: 0    * patient was recommended to be COVID tested, she will make an appointment as soon as possible to be tested. She was educated on signs and symptoms for immediate evaluation in the ER. Patient did appear stable on the phone during the visit. Return in about 3 months (around 12/30/2020). Shaylee Porter is a 62 y.o. female being evaluated by a Virtual Visit (video visit) encounter to address concerns as mentioned above. A caregiver was present when appropriate. Due to this being a TeleHealth encounter (During DEGYE-86 public health emergency), evaluation of the following organ systems was limited: Vitals/Constitutional/EENT/Resp/CV/GI//MS/Neuro/Skin/Heme-Lymph-Imm.   Pursuant to the emergency declaration under the [facility], [address] [place] [address] authority and the [employer] Act, this Virtual Visit was conducted with patient's (and/or legal guardian's) consent, to reduce the patient's risk of exposure to COVID-19 and provide necessary medical care. The patient (and/or legal guardian) has also been advised to contact this office for worsening conditions or problems, and seek emergency medical treatment and/or call 911 if deemed necessary. Patient identification was verified at the start of the visit: Yes    Total time spent on this encounter: Not billed by time    Services were provided through a video synchronous discussion virtually to substitute for in-person clinic visit. Patient and provider were located at their individual homes. --Merry Marroquin DO on 9/30/2020 at 8:57 PM    An electronic signature was used to authenticate this note.

## 2020-09-30 NOTE — TELEPHONE ENCOUNTER
Patient is calling requesting to be seen today either physically or virtually she states she doesn't have a fever but she does have a productive cough and a sore throat she states symptoms started yesterday please advise

## 2020-11-19 ENCOUNTER — HOSPITAL ENCOUNTER (EMERGENCY)
Age: 55
Discharge: HOME | End: 2020-11-19
Attending: EMERGENCY MEDICINE
Payer: COMMERCIAL

## 2020-11-19 ENCOUNTER — APPOINTMENT (OUTPATIENT)
Dept: GENERAL RADIOLOGY | Age: 55
End: 2020-11-19
Payer: COMMERCIAL

## 2020-11-19 VITALS
BODY MASS INDEX: 37.71 KG/M2 | HEART RATE: 70 BPM | DIASTOLIC BLOOD PRESSURE: 70 MMHG | WEIGHT: 220.9 LBS | SYSTOLIC BLOOD PRESSURE: 118 MMHG | OXYGEN SATURATION: 99 % | RESPIRATION RATE: 18 BRPM | HEIGHT: 64 IN | TEMPERATURE: 98.3 F

## 2020-11-19 PROCEDURE — 6360000002 HC RX W HCPCS: Performed by: EMERGENCY MEDICINE

## 2020-11-19 PROCEDURE — 99284 EMERGENCY DEPT VISIT MOD MDM: CPT

## 2020-11-19 PROCEDURE — 96372 THER/PROPH/DIAG INJ SC/IM: CPT

## 2020-11-19 PROCEDURE — 73030 X-RAY EXAM OF SHOULDER: CPT

## 2020-11-19 RX ORDER — PREDNISONE 20 MG/1
60 TABLET ORAL DAILY
Qty: 15 TABLET | Refills: 0 | Status: SHIPPED | OUTPATIENT
Start: 2020-11-19 | End: 2020-11-24

## 2020-11-19 RX ORDER — KETOROLAC TROMETHAMINE 30 MG/ML
30 INJECTION, SOLUTION INTRAMUSCULAR; INTRAVENOUS ONCE
Status: COMPLETED | OUTPATIENT
Start: 2020-11-19 | End: 2020-11-19

## 2020-11-19 RX ADMIN — KETOROLAC TROMETHAMINE 30 MG: 30 INJECTION, SOLUTION INTRAMUSCULAR at 13:05

## 2020-11-19 ASSESSMENT — PAIN DESCRIPTION - ORIENTATION
ORIENTATION: LEFT
ORIENTATION: LEFT

## 2020-11-19 ASSESSMENT — PAIN - FUNCTIONAL ASSESSMENT: PAIN_FUNCTIONAL_ASSESSMENT: 0-10

## 2020-11-19 ASSESSMENT — PAIN DESCRIPTION - PAIN TYPE
TYPE: ACUTE PAIN
TYPE: ACUTE PAIN

## 2020-11-19 ASSESSMENT — PAIN SCALES - GENERAL
PAINLEVEL_OUTOF10: 6
PAINLEVEL_OUTOF10: 6
PAINLEVEL_OUTOF10: 5

## 2020-11-19 ASSESSMENT — PAIN DESCRIPTION - FREQUENCY: FREQUENCY: CONTINUOUS

## 2020-11-19 ASSESSMENT — PAIN DESCRIPTION - DESCRIPTORS: DESCRIPTORS: ACHING;BURNING;THROBBING

## 2020-11-19 ASSESSMENT — PAIN DESCRIPTION - LOCATION
LOCATION: ARM
LOCATION: SHOULDER

## 2020-11-19 NOTE — ED NOTES
Left arm and shoulder pain for the last 4 days. Pt denies any trauma or injury.       [address] Abigail, RN  11/19/20 2051

## 2020-11-19 NOTE — ED PROVIDER NOTES
CHIEF COMPLAINT  Chief Complaint   Patient presents with    Arm Pain     right arm and shoulder pain for the last 4 days.  Shoulder Pain       HISTORY OF PRESENT ILLNESS  Stewart Chavez is a 62 y.o. female who presents to the ED complaining of a 4 to 5-day history of left shoulder pain without known preceding injury with an achy discomfort in the shoulder at rest but more severe pain with attempts to abduct the shoulder or internally rotate the shoulder. No shoulder swelling or redness. No fevers or chills. Patient reports mild discomfort in the left trapezius that she believes is secondary to holding the shoulder up. Patient reports that when she has wakened up at night the shoulder has felt very stiff and is painful to move. Patient denies any chest pain or shortness of breath. No other anginal equivalents. No jaw pain. No nausea. No arm weakness or paresthesias. Patient's pain radiates from the shoulder down to approximately the elbow. No rash    No other complaints, modifying factors or associated symptoms. Nursing notes reviewed.    Past Medical History:   Diagnosis Date    Anxiety     Cancer ([facility] [address] 03/29/2018    Cervical    Hypertension     Hyperthyroidism      Past Surgical History:   Procedure Laterality Date    HYSTERECTOMY  05/14/2018    Robotic-assisted Total Laparoscopic Hysterectomy with Bilateral Salpingo-oophorectomy    TONSILLECTOMY      TUBAL LIGATION       Family History   Problem Relation Age of Onset    Stroke Father      Social History     Socioeconomic History    Marital status:      Spouse name: Not on file    Number of children: Not on file    Years of education: Not on file    Highest education level: Not on file   Occupational History    Not on file   Social Needs    Financial resource strain: Not on file    Food insecurity     Worry: Not on file     Inability: Not on file    Transportation needs     Medical: Not on file     Non-medical: Not on file   Tobacco Use    Smoking status: Former Smoker     Packs/day: 0.50     Years: 10.00     Pack years: 5.00     Types: Cigarettes     Last attempt to quit:      Years since quittin.8    Smokeless tobacco: Never Used   Substance and Sexual Activity    Alcohol use: Never     Frequency: Never     Comment:  Occ    Drug use: No    Sexual activity: Yes   Lifestyle    Physical activity     Days per week: Not on file     Minutes per session: Not on file    Stress: Not on file   Relationships    Social connections     Talks on phone: Not on file     Gets together: Not on file     Attends Holiness service: Not on file     Active member of club or organization: Not on file     Attends meetings of clubs or organizations: Not on file     Relationship status: Not on file    Intimate partner violence     Fear of current or ex partner: Not on file     Emotionally abused: Not on file     Physically abused: Not on file     Forced sexual activity: Not on file   Other Topics Concern    Not on file   Social History Narrative    Not on file     Current Facility-Administered Medications   Medication Dose Route Frequency Provider Last Rate Last Dose    ketorolac (TORADOL) injection 30 mg  30 mg Intramuscular Once Wilman Garcia MD         Current Outpatient Medications   Medication Sig Dispense Refill    predniSONE (DELTASONE) 20 MG tablet Take 3 tablets by mouth daily for 5 days 15 tablet 0    levothyroxine (SYNTHROID) 100 MCG tablet TAKE 1 TABLET BY MOUTH EVERY DAY 90 tablet 1    lisinopril-hydrochlorothiazide (PRINZIDE;ZESTORETIC) 10-12.5 MG per tablet Take 1 tablet by mouth daily 90 tablet 3    rosuvastatin (CRESTOR) 10 MG tablet TAKE 1 TABLET AT BEDTIME 90 tablet 3    calcium carbonate (OSCAL) 500 MG TABS tablet Take 500 mg by mouth daily      magnesium 30 MG tablet Take 30 mg by mouth 2 times daily      estrogens, conjugated,-methyltestosterone (ESTRATEST) 1.25-2.5 MG per tablet TAKE 1 TABLET BY MOUTH EVERY DAY  4    omeprazole (PRILOSEC) 40 MG delayed release capsule TAKE 1 CAPSULE BY MOUTH EVERY DAY 30 capsule 4    PARoxetine (PAXIL) 20 MG tablet       BIOTIN 5000 PO Take 5,000 mg by mouth daily       Multiple Vitamins-Minerals (CENTRUM SILVER PO) Take by mouth       Allergies   Allergen Reactions    Egg Shells      Other reaction(s): GI Upset  Pt states that eating eggs (yolk) makes her sick \"all day long\" where she has to lay down. States is able to take the flu shot. REVIEW OF SYSTEMS  Positives and pertinent negatives as per HPI. Six other systems were reviewed and are negative. Nursing notes pertaining to ROS were reviewed. PHYSICAL EXAM   /72   Pulse 79   Temp 98.3 °F (36.8 °C) (Oral)   Resp 15   Ht 5' 4\" (1.626 m)   Wt 220 lb 14.4 oz (100.2 kg)   LMP 03/04/2009   SpO2 99%   BMI 37.92 kg/m²   GENERAL APPEARANCE: Awake and alert. Cooperative. No acute distress. HEAD: Normocephalic. Atraumatic. EYES: PERRL. EOM's grossly intact. No scleral icterus, injection or exudate  ENT: Mucous membranes are moist.  NECK: Supple. Normal ROM. CHEST:  Regular rate and rhythm, no murmurs, rubs or gallops  LUNGS: Breathing is unlabored. Speaking comfortably in full sentences. Clear through auscultation bilaterally  ABDOMEN: Nondistended, nontender  EXTREMITIES: Patient has mild tenderness palpation over the left subdeltoid bursa that reproduces her pain. Patient's drop arm test reveals no weakness but resistance to adduction reproduces pain. Patient has an abnormal impingement test both anteriorly and laterally and internal rotation of the shoulder with reaching behind the back reproduces the patient's pain exactly. There is no rash of the left upper extremity and there is no erythema, induration or warmth of the left shoulder. There is no tenderness to palpation of the left elbow, forearm or wrist.  No tenderness over the left proximal or distal bicipital tendon.   No tenderness over the biceps or triceps. No tenderness over the left acromioclavicular joint or scapula. No tenderness to palpation over the left trapezius or paraspinal muscles of the cervical spine. SKIN: Warm and dry. NEUROLOGICAL: Alert and oriented. RADIOLOGY    XR SHOULDER LEFT (MIN 2 VIEWS)   Final Result   Nondisplaced left distal clavicle fracture. ED COURSE/MDM  Left shoulder pain: Differential diagnosis includes rotator cuff tendinitis, subdeltoid bursitis, rotator cuff tear. Patient does not have evidence of septic arthritis or acute gout of the left shoulder. I do not believe this represents referred pain as the patient's discomfort is minimal and is exactly reproduced by maneuvers of the shoulder suggesting an intra-articular pathology rather than referred pathology from the heart, lungs or chest.  I do not believe further evaluation of referred pain is necessary and symptomatology is also inconsistent with a cervical radiculopathy. Patient was advised to continue anti-inflammatories, sling as needed. Ice as needed. Follow-up with orthopedics in 5 to 7 days and patient will be given a 5-day course of prednisone. An x-ray was performed and was read as a nondisplaced left distal clavicle fracture but the patient has minimal discomfort over the left distal clavicle with no swelling and no significant tenderness and no history that would have led to this finding. It is conceivable that she has a nondisplaced left distal clavicle fracture and will be placed in a sling with orthopedic follow-up but I also believe that this is possible that it is an over read and she will follow up with orthopedics in 1 week    Patient was given scripts for the following medications. I counseled patient how to take these medications. New Prescriptions    PREDNISONE (DELTASONE) 20 MG TABLET    Take 3 tablets by mouth daily for 5 days         CLINICAL IMPRESSION  1.  Acute pain of left shoulder Blood pressure 120/72, pulse 79, temperature 98.3 °F (36.8 °C), temperature source Oral, resp. rate 15, height 5' 4\" (1.626 m), weight 220 lb 14.4 oz (100.2 kg), last menstrual period 03/04/2009, SpO2 99 %, not currently breastfeeding.       Follow-up with:  Gael Bradford MD  [address] [place] [address]  [phone]    In 1 week  If symptoms worsen         Chastity Macario MD  11/19/20 5101

## 2020-11-19 NOTE — ED NOTES
Discharge instructions reviewed. Patient verbalized understanding. Prescription x1 given.        Lieutenant Ayo RN  11/19/20 3908

## 2020-11-25 ENCOUNTER — OFFICE VISIT (OUTPATIENT)
Dept: ORTHOPEDIC SURGERY | Age: 55
End: 2020-11-25
Payer: COMMERCIAL

## 2020-11-25 VITALS — TEMPERATURE: 98 F | BODY MASS INDEX: 37.56 KG/M2 | WEIGHT: 220 LBS | HEIGHT: 64 IN

## 2020-11-25 PROBLEM — M75.82 ROTATOR CUFF TENDINITIS, LEFT: Status: ACTIVE | Noted: 2020-11-25

## 2020-11-25 PROBLEM — M75.42 SHOULDER IMPINGEMENT SYNDROME, LEFT: Status: ACTIVE | Noted: 2020-11-25

## 2020-11-25 PROCEDURE — 99203 OFFICE O/P NEW LOW 30 MIN: CPT | Performed by: ORTHOPAEDIC SURGERY

## 2020-11-25 PROCEDURE — 20610 DRAIN/INJ JOINT/BURSA W/O US: CPT | Performed by: ORTHOPAEDIC SURGERY

## 2020-11-25 RX ORDER — LIDOCAINE HYDROCHLORIDE 10 MG/ML
40 INJECTION, SOLUTION INFILTRATION; PERINEURAL ONCE
Status: COMPLETED | OUTPATIENT
Start: 2020-11-25 | End: 2020-11-25

## 2020-11-25 RX ORDER — TRIAMCINOLONE ACETONIDE 40 MG/ML
40 INJECTION, SUSPENSION INTRA-ARTICULAR; INTRAMUSCULAR ONCE
Status: COMPLETED | OUTPATIENT
Start: 2020-11-25 | End: 2020-11-25

## 2020-11-25 RX ORDER — NAPROXEN 500 MG/1
500 TABLET ORAL 2 TIMES DAILY WITH MEALS
Qty: 60 TABLET | Refills: 0 | Status: SHIPPED | OUTPATIENT
Start: 2020-11-25 | End: 2021-02-22

## 2020-11-25 RX ADMIN — LIDOCAINE HYDROCHLORIDE 40 MG: 10 INJECTION, SOLUTION INFILTRATION; PERINEURAL at 08:27

## 2020-11-25 RX ADMIN — TRIAMCINOLONE ACETONIDE 40 MG: 40 INJECTION, SUSPENSION INTRA-ARTICULAR; INTRAMUSCULAR at 08:27

## 2020-11-25 NOTE — PROGRESS NOTES
organization: Not on file     Attends meetings of clubs or organizations: Not on file     Relationship status: Not on file    Intimate partner violence     Fear of current or ex partner: Not on file     Emotionally abused: Not on file     Physically abused: Not on file     Forced sexual activity: Not on file   Other Topics Concern    Not on file   Social History Narrative    Not on file       Family History   Problem Relation Age of Onset    Stroke Father        Current Outpatient Medications on File Prior to Visit   Medication Sig Dispense Refill    levothyroxine (SYNTHROID) 100 MCG tablet TAKE 1 TABLET BY MOUTH EVERY DAY 90 tablet 1    lisinopril-hydrochlorothiazide (PRINZIDE;ZESTORETIC) 10-12.5 MG per tablet Take 1 tablet by mouth daily 90 tablet 3    rosuvastatin (CRESTOR) 10 MG tablet TAKE 1 TABLET AT BEDTIME 90 tablet 3    calcium carbonate (OSCAL) 500 MG TABS tablet Take 500 mg by mouth daily      magnesium 30 MG tablet Take 30 mg by mouth 2 times daily      estrogens, conjugated,-methyltestosterone (ESTRATEST) 1.25-2.5 MG per tablet TAKE 1 TABLET BY MOUTH EVERY DAY  4    omeprazole (PRILOSEC) 40 MG delayed release capsule TAKE 1 CAPSULE BY MOUTH EVERY DAY 30 capsule 4    PARoxetine (PAXIL) 20 MG tablet       BIOTIN 5000 PO Take 5,000 mg by mouth daily       Multiple Vitamins-Minerals (CENTRUM SILVER PO) Take by mouth       No current facility-administered medications on file prior to visit. Pertinent items are noted in HPI  Review of systems reviewed from Patient History Form dated on 11/25/2020 and available in the patient's chart under the Media tab. No change noted. PHYSICAL EXAMINATION:  Ms. Clinton Fagan is a very pleasant 62 y.o.  female who presents today in no acute distress, awake, alert, and oriented. She is well dressed, nourished and  groomed. Patient with normal affect. Height is  5' 4\" (1.626 m), weight is 220 lb (99.8 kg), Body mass index is 37.76 kg/m². Resting respiratory rate is 16. Examination of the gait, showed that the patient walks heel-toe with a non-antalgic gait and no limp. On evaluation of the left shoulder, range of motion is 120 degree in flexion and 120 degree in abduction. There is positve impingement signs. Motor and sensation is intact and symmetric throughout the bilateral upper extremities in the median, ulnar and radial nerve distributions. She has 2+ radial pulses bilaterally. IMAGING: X-rays were taken in [facility] ED 11/19/2020, 3 views of the left shoulder, and showed no acute fracture. Mild cuff arthropathy. IMPRESSION: Left shoulder cuff arthropathy. PLAN: I discussed with the patient the treatment options including both surgical and non-surgical treatment. We recommended stretching exercises of the shoulder was taught to the patient today. She will take NSAIDS Naprosyn. I believe she will benefit from cortisone injection left shoulder, and she agreed to have. PROCEDURE:    With the patient's permission, and under sterile condition, the left shoulder was prepared and draped with alcohol and sub-acromial space was injected with a mixture of 1 ml Kenalog 40mg and 4 ml of 1% lidocaine. The patient tolerated the procedure well. A band-aid was applied and the patient was advised to ice the shoulder for 15-20 minutes to relieve any injection site related pain. She reported a good improvement immediatly after the injection. F/u in 3-4 months, PT if needed. She understands that this may need surgery if the pain did not to resolve.        Dena Solares MD

## 2020-12-18 RX ORDER — NAPROXEN 500 MG/1
TABLET ORAL
Qty: 60 TABLET | Refills: 0 | OUTPATIENT
Start: 2020-12-18

## 2021-01-21 DIAGNOSIS — E78.2 MIXED HYPERLIPIDEMIA: ICD-10-CM

## 2021-01-21 RX ORDER — ROSUVASTATIN CALCIUM 10 MG/1
TABLET, COATED ORAL
Qty: 90 TABLET | Refills: 3 | Status: SHIPPED | OUTPATIENT
Start: 2021-01-21 | End: 2021-02-25 | Stop reason: DRUGHIGH

## 2021-02-22 ENCOUNTER — OFFICE VISIT (OUTPATIENT)
Dept: FAMILY MEDICINE CLINIC | Age: 56
End: 2021-02-22
Payer: COMMERCIAL

## 2021-02-22 VITALS
BODY MASS INDEX: 38.31 KG/M2 | SYSTOLIC BLOOD PRESSURE: 132 MMHG | WEIGHT: 224.4 LBS | DIASTOLIC BLOOD PRESSURE: 84 MMHG | TEMPERATURE: 96.7 F | HEIGHT: 64 IN

## 2021-02-22 DIAGNOSIS — E03.9 ACQUIRED HYPOTHYROIDISM: Primary | ICD-10-CM

## 2021-02-22 DIAGNOSIS — E78.2 MIXED HYPERLIPIDEMIA: ICD-10-CM

## 2021-02-22 DIAGNOSIS — G31.84 MCI (MILD COGNITIVE IMPAIRMENT) WITH MEMORY LOSS: ICD-10-CM

## 2021-02-22 DIAGNOSIS — I10 ESSENTIAL HYPERTENSION: ICD-10-CM

## 2021-02-22 DIAGNOSIS — D06.9 SEVERE DYSPLASIA OF CERVIX (CIN III): ICD-10-CM

## 2021-02-22 LAB
A/G RATIO: 1.9 (ref 1.1–2.2)
ALBUMIN SERPL-MCNC: 4.3 G/DL (ref 3.4–5)
ALP BLD-CCNC: 61 U/L (ref 40–129)
ALT SERPL-CCNC: 14 U/L (ref 10–40)
ANION GAP SERPL CALCULATED.3IONS-SCNC: 14 MMOL/L (ref 3–16)
AST SERPL-CCNC: 15 U/L (ref 15–37)
BILIRUB SERPL-MCNC: 0.5 MG/DL (ref 0–1)
BUN BLDV-MCNC: 15 MG/DL (ref 7–20)
CALCIUM SERPL-MCNC: 9.2 MG/DL (ref 8.3–10.6)
CHLORIDE BLD-SCNC: 104 MMOL/L (ref 99–110)
CHOLESTEROL, TOTAL: 337 MG/DL (ref 0–199)
CO2: 22 MMOL/L (ref 21–32)
CREAT SERPL-MCNC: 0.6 MG/DL (ref 0.6–1.1)
GFR AFRICAN AMERICAN: >60
GFR NON-AFRICAN AMERICAN: >60
GLOBULIN: 2.3 G/DL
GLUCOSE BLD-MCNC: 99 MG/DL (ref 70–99)
HCT VFR BLD CALC: 40.7 % (ref 36–48)
HDLC SERPL-MCNC: 54 MG/DL (ref 40–60)
HEMOGLOBIN: 14.2 G/DL (ref 12–16)
LDL CHOLESTEROL CALCULATED: 234 MG/DL
MCH RBC QN AUTO: 33.4 PG (ref 26–34)
MCHC RBC AUTO-ENTMCNC: 35 G/DL (ref 31–36)
MCV RBC AUTO: 95.3 FL (ref 80–100)
PDW BLD-RTO: 13.8 % (ref 12.4–15.4)
PLATELET # BLD: 312 K/UL (ref 135–450)
PMV BLD AUTO: 8.3 FL (ref 5–10.5)
POTASSIUM SERPL-SCNC: 4.3 MMOL/L (ref 3.5–5.1)
RBC # BLD: 4.26 M/UL (ref 4–5.2)
SODIUM BLD-SCNC: 140 MMOL/L (ref 136–145)
TOTAL PROTEIN: 6.6 G/DL (ref 6.4–8.2)
TRIGL SERPL-MCNC: 244 MG/DL (ref 0–150)
TSH REFLEX FT4: 1.88 UIU/ML (ref 0.27–4.2)
VLDLC SERPL CALC-MCNC: 49 MG/DL
WBC # BLD: 5.9 K/UL (ref 4–11)

## 2021-02-22 PROCEDURE — 99214 OFFICE O/P EST MOD 30 MIN: CPT | Performed by: FAMILY MEDICINE

## 2021-02-22 RX ORDER — LEVOTHYROXINE SODIUM 0.1 MG/1
TABLET ORAL
Qty: 90 TABLET | Refills: 1 | Status: SHIPPED | OUTPATIENT
Start: 2021-02-22 | End: 2021-08-23

## 2021-02-22 ASSESSMENT — ENCOUNTER SYMPTOMS
ABDOMINAL PAIN: 0
SINUS PRESSURE: 0
TROUBLE SWALLOWING: 0
COUGH: 0
NAUSEA: 0
VOMITING: 0
SORE THROAT: 0
DIARRHEA: 0
SHORTNESS OF BREATH: 0
RHINORRHEA: 0

## 2021-02-22 NOTE — PROGRESS NOTES
2021     Aria Perez (:  1965) is a 60 y.o. female, here for evaluation of the following medical concerns:    HPI     1. Hyperlipidemia- needs labs today, has stopped her medication due to feeling she is well controlled, still obese but believes her diet is improving. 2.  HTN-  Well controlled today without medication,,enied headache, vision change, or dizziness. No longer taking Lisinopril-hydrochlorothiazide.      3. Depression/anxiety-  Patient feels well controled on Paxil 20 mg a day, no side effects from the medications.      4. Hypothyroidism- taking Synthroid 100 MCG, believes it is well controlled, no side effects from the medication, would like to have labs today.      5. 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     6.  Screening- needs labs     7.  Memory loss- patient had recently CT that showed possible mild chronic microvascular ischemic changes in the right frontal periventricular white matter, of statin, hasn't had cholesterol checked, had appointment with Neurologist last Feb. Ordered MRI but never obtained study. ,  stated her symptoms started with memory loss, stable today, not noticeable until she points this out. Denied headache, vision change, or dizziness.      Today, denied chest pain, sob, n, v, or diarrhea. Review of Systems   Constitutional: Negative for activity change, fatigue, fever and unexpected weight change. HENT: Negative for congestion, ear pain, rhinorrhea, sinus pressure, sore throat and trouble swallowing. Eyes: Negative for visual disturbance. Respiratory: Negative for cough and shortness of breath. Cardiovascular: Negative for chest pain and leg swelling. Gastrointestinal: Negative for abdominal pain, diarrhea, nausea and vomiting. Endocrine: Negative for cold intolerance, heat intolerance, polydipsia and polyphagia. Musculoskeletal: Positive for arthralgias. Skin: Negative for rash. Neurological: Negative for dizziness, tremors, syncope, numbness and headaches. Psychiatric/Behavioral: Positive for decreased concentration. Negative for dysphoric mood. The patient is not nervous/anxious. Prior to Visit Medications    Medication Sig Taking? Authorizing Provider   levothyroxine (SYNTHROID) 100 MCG tablet TAKE 1 TABLET BY MOUTH EVERY DAY Yes Soy Phillips DO   rosuvastatin (CRESTOR) 10 MG tablet TAKE 1 TABLET BY MOUTH EVERYDAY AT BEDTIME Yes Soy Phillips DO   calcium carbonate (OSCAL) 500 MG TABS tablet Take 500 mg by mouth daily Yes Historical Provider, MD   magnesium 30 MG tablet Take 30 mg by mouth 2 times daily Yes Historical Provider, MD   omeprazole (PRILOSEC) 40 MG delayed release capsule TAKE 1 CAPSULE BY MOUTH EVERY DAY Yes Maya Singleton MD   PARoxetine (PAXIL) 20 MG tablet  Yes Historical Provider, MD   BIOTIN 5000 PO Take 5,000 mg by mouth daily  Yes Historical Provider, MD   Multiple Vitamins-Minerals (CENTRUM SILVER PO) Take by mouth Yes Historical Provider, MD   estrogens, conjugated,-methyltestosterone (ESTRATEST) 1.25-2.5 MG per tablet TAKE 1 TABLET BY MOUTH EVERY DAY  Historical Provider, MD        Social History     Tobacco Use    Smoking status: Former Smoker     Packs/day: 0.50     Years: 10.00     Pack years: 5.00     Types: Cigarettes     Quit date:      Years since quittin.1    Smokeless tobacco: Never Used   Substance Use Topics    Alcohol use: Never     Frequency: Never     Comment: Occ        Vitals:    21 0806   BP: 132/84   Temp: 96.7 °F (35.9 °C)   Weight: 224 lb 6.4 oz (101.8 kg)   Height: 5' 4\" (1.626 m)     Estimated body mass index is 38.52 kg/m² as calculated from the following:    Height as of this encounter: 5' 4\" (1.626 m). Weight as of this encounter: 224 lb 6.4 oz (101.8 kg). Physical Exam  Vitals signs and nursing note reviewed. Constitutional:       Appearance: She is well-developed. HENT:      Head: Normocephalic and atraumatic. Right Ear: External ear normal.      Left Ear: External ear normal.      Nose: Nose normal.   Eyes:      Conjunctiva/sclera: Conjunctivae normal.      Pupils: Pupils are equal, round, and reactive to light. Cardiovascular:      Rate and Rhythm: Normal rate and regular rhythm. Heart sounds: Normal heart sounds. No murmur. Pulmonary:      Effort: Pulmonary effort is normal.      Breath sounds: Normal breath sounds. No wheezing. Abdominal:      General: Bowel sounds are normal.      Palpations: Abdomen is soft. Tenderness: There is no abdominal tenderness. Musculoskeletal: Normal range of motion. Skin:     General: Skin is warm and dry. Neurological:      Mental Status: She is alert and oriented to person, place, and time. Psychiatric:         Behavior: Behavior normal.         Thought Content: Thought content normal.         Judgment: Judgment normal.         ASSESSMENT/PLAN:  1. Acquired hypothyroidism  Stable  Continue with medication  Labs obtained  Answered questions  - CBC  - Comprehensive Metabolic Panel  - Lipid Panel  - TSH WITH REFLEX TO FT4  - levothyroxine (SYNTHROID) 100 MCG tablet; TAKE 1 TABLET BY MOUTH EVERY DAY  Dispense: 90 tablet; Refill: 1    2. Severe dysplasia of cervix (JOMAR III)  Stable  Continue with medication  Keep appointments with specialist.   Sky Marti questions    3. MCI (mild cognitive impairment) with memory loss  Stable  Continue with medication  Keep appointments with specialist.   Sky Marti questions    4. Mixed hyperlipidemia  Take medication as prescribed. Discussed the need to exercise 30 minutes each day. Monitor diet, avoid fried foods etc... Educated on need to reduce cholesterol in diet and results of poor diet. 5. Essential hypertension  Well controlled. Discussed signs and symptoms for immediate evaluation in the ER. Reduce sodium. Monitor diet, exercise and lose weight. Keep a BP log and bring it to your next appointment. Return in about 6 months (around 8/22/2021).

## 2021-02-25 RX ORDER — ROSUVASTATIN CALCIUM 20 MG/1
20 TABLET, COATED ORAL DAILY
Qty: 30 TABLET | Refills: 3 | Status: SHIPPED | OUTPATIENT
Start: 2021-02-25 | End: 2021-06-18

## 2021-05-05 ENCOUNTER — OFFICE VISIT (OUTPATIENT)
Dept: ORTHOPEDIC SURGERY | Age: 56
End: 2021-05-05
Payer: COMMERCIAL

## 2021-05-05 VITALS
BODY MASS INDEX: 38.24 KG/M2 | HEIGHT: 64 IN | DIASTOLIC BLOOD PRESSURE: 80 MMHG | SYSTOLIC BLOOD PRESSURE: 117 MMHG | WEIGHT: 224 LBS | HEART RATE: 75 BPM

## 2021-05-05 DIAGNOSIS — M75.42 SHOULDER IMPINGEMENT SYNDROME, LEFT: Primary | ICD-10-CM

## 2021-05-05 DIAGNOSIS — M75.82 ROTATOR CUFF TENDINITIS, LEFT: ICD-10-CM

## 2021-05-05 PROCEDURE — 99213 OFFICE O/P EST LOW 20 MIN: CPT | Performed by: NURSE PRACTITIONER

## 2021-05-05 NOTE — PROGRESS NOTES
CHIEF COMPLAINT: Left shoulder pain/ cuff arthropathy/ impingement syndrome. HISTORY:  Ms. Kendell Davis 62 y.o. female RHD presents today for the first visit for evaluation of left shoulder pain which started Oct 2020 with no known injury. She had a cortisone injection on 2020 with 6 weeks of improvement, but then her pain returned and now it is worse than before. She has tried NSAIDs, Naprosyn with no improvement. She is complaining of sharp pain. Rates pain a 5/10 VAS anytime she attempts to reach behind her, in front of her, or overhead. Pain is increase with elevation and decrease with rest. No radiation and no numbness and tingling sensation. No other complaint. No h/o trauma or gout. She went to [facility] on 2020. Past Medical History:   Diagnosis Date    Anxiety     Cancer ([facility] [address] 2018    Cervical    Hypertension     Hyperthyroidism      Past Surgical History:   Procedure Laterality Date    HYSTERECTOMY  2018    Robotic-assisted Total Laparoscopic Hysterectomy with Bilateral Salpingo-oophorectomy    TONSILLECTOMY      TUBAL LIGATION       Family History   Problem Relation Age of Onset    Stroke Father      Social History     Socioeconomic History    Marital status:      Spouse name: Not on file    Number of children: Not on file    Years of education: Not on file    Highest education level: Not on file   Occupational History    Not on file   Social Needs    Financial resource strain: Not on file    Food insecurity     Worry: Not on file     Inability: Not on file    Transportation needs     Medical: Not on file     Non-medical: Not on file   Tobacco Use    Smoking status: Former Smoker     Packs/day: 0.50     Years: 10.00     Pack years: 5.00     Types: Cigarettes     Quit date:      Years since quittin.3    Smokeless tobacco: Never Used   Substance and Sexual Activity    Alcohol use: Never     Frequency: Never     Comment: Occ    Drug use:  No  Sexual activity: Yes   Lifestyle    Physical activity     Days per week: Not on file     Minutes per session: Not on file    Stress: Not on file   Relationships    Social connections     Talks on phone: Not on file     Gets together: Not on file     Attends Jehovah's witness service: Not on file     Active member of club or organization: Not on file     Attends meetings of clubs or organizations: Not on file     Relationship status: Not on file    Intimate partner violence     Fear of current or ex partner: Not on file     Emotionally abused: Not on file     Physically abused: Not on file     Forced sexual activity: Not on file   Other Topics Concern    Not on file   Social History Narrative    Not on file     Current Outpatient Medications   Medication Sig Dispense Refill    rosuvastatin (CRESTOR) 20 MG tablet Take 1 tablet by mouth daily 30 tablet 3    levothyroxine (SYNTHROID) 100 MCG tablet TAKE 1 TABLET BY MOUTH EVERY DAY 90 tablet 1    calcium carbonate (OSCAL) 500 MG TABS tablet Take 500 mg by mouth daily      magnesium 30 MG tablet Take 30 mg by mouth 2 times daily      estrogens, conjugated,-methyltestosterone (ESTRATEST) 1.25-2.5 MG per tablet TAKE 1 TABLET BY MOUTH EVERY DAY  4    omeprazole (PRILOSEC) 40 MG delayed release capsule TAKE 1 CAPSULE BY MOUTH EVERY DAY 30 capsule 4    PARoxetine (PAXIL) 20 MG tablet       BIOTIN 5000 PO Take 5,000 mg by mouth daily       Multiple Vitamins-Minerals (CENTRUM SILVER PO) Take by mouth       No current facility-administered medications for this visit. Pertinent items are noted in HPI  Review of systems reviewed from Patient History Form dated on 11/25/2020 and available in the patient's chart under the Media tab. No change noted. PHYSICAL EXAMINATION:  Ms. Estela Riggins is a very pleasant 60 y.o.  female who presents today in no acute distress, awake, alert, and oriented. She is well dressed, nourished and  groomed.   Patient with normal affect. Height is  5' 4\" (1.626 m), weight is 224 lb (101.6 kg), Body mass index is 38.45 kg/m². Resting respiratory rate is 16. Examination of the gait, showed that the patient walks heel-toe with a non-antalgic gait and no limp. On evaluation of the left shoulder, range of motion is 120 degree in flexion and 120 degree in abduction. There is positve impingement signs. Motor and sensation is intact and symmetric throughout the bilateral upper extremities in the median, ulnar and radial nerve distributions. She has 2+ radial pulses bilaterally. IMAGING: X-rays were taken in the [facility] ED 11/19/2020, 3 views of the left shoulder, and showed no acute fracture. Mild cuff arthropathy. IMPRESSION: Left shoulder cuff arthropathy. PLAN: I discussed with the patient the treatment options including both surgical and non-surgical treatment. We recommended stretching exercises of the shoulder was taught to the patient today. She will take NSAIDS Naprosyn. Since she has failed conservative care and her pain is worse, recommend getting an MRI. Follow-up results.      Jaymie Husain, APRN - CNP

## 2021-05-06 ENCOUNTER — TELEPHONE (OUTPATIENT)
Dept: ORTHOPEDIC SURGERY | Age: 56
End: 2021-05-06

## 2021-06-02 ENCOUNTER — OFFICE VISIT (OUTPATIENT)
Dept: ORTHOPEDIC SURGERY | Age: 56
End: 2021-06-02
Payer: COMMERCIAL

## 2021-06-02 VITALS — HEIGHT: 64 IN | WEIGHT: 224 LBS | BODY MASS INDEX: 38.24 KG/M2

## 2021-06-02 DIAGNOSIS — M75.82 ROTATOR CUFF TENDINITIS, LEFT: ICD-10-CM

## 2021-06-02 DIAGNOSIS — M75.42 SHOULDER IMPINGEMENT SYNDROME, LEFT: Primary | ICD-10-CM

## 2021-06-02 PROCEDURE — 99214 OFFICE O/P EST MOD 30 MIN: CPT | Performed by: ORTHOPAEDIC SURGERY

## 2021-06-02 RX ORDER — MELOXICAM 7.5 MG/1
7.5 TABLET ORAL DAILY
Qty: 30 TABLET | Refills: 0 | Status: SHIPPED | OUTPATIENT
Start: 2021-06-02 | End: 2021-06-14

## 2021-06-02 NOTE — PROGRESS NOTES
CHIEF COMPLAINT: Left shoulder pain/ cuff arthropathy/ impingement syndrome. HISTORY:  Ms. Wheat Co 60 y.o. female RHD presents today for f/u evaluation of left shoulder pain which started Oct 2020 with no known injury. She had a cortisone injection on 2020 with 6 weeks of improvement. She is still complaining of sharp pain. Pain is increase with elevation and decrease with rest. No radiation and no numbness and tingling sensation. No other complaint. No h/o trauma or gout. She went to [place] ED on 2020. Past Medical History:   Diagnosis Date    Anxiety     Cancer ([facility] [address] 2018    Cervical    Hypertension     Hyperthyroidism        Past Surgical History:   Procedure Laterality Date    HYSTERECTOMY  2018    Robotic-assisted Total Laparoscopic Hysterectomy with Bilateral Salpingo-oophorectomy    TONSILLECTOMY      TUBAL LIGATION         Social History     Socioeconomic History    Marital status:      Spouse name: Not on file    Number of children: Not on file    Years of education: Not on file    Highest education level: Not on file   Occupational History    Not on file   Tobacco Use    Smoking status: Former Smoker     Packs/day: 0.50     Years: 10.00     Pack years: 5.00     Types: Cigarettes     Quit date:      Years since quittin.4    Smokeless tobacco: Never Used   Vaping Use    Vaping Use: Never used   Substance and Sexual Activity    Alcohol use: Never     Comment: Occ    Drug use: No    Sexual activity: Yes   Other Topics Concern    Not on file   Social History Narrative    Not on file     Social Determinants of Health     Financial Resource Strain:     Difficulty of Paying Living Expenses:    Food Insecurity:     Worried About Running Out of Food in the Last Year:     [address] in the Last Year:    Transportation Needs:     Lack of Transportation (Medical):      Lack of Transportation (Non-Medical):    Physical Activity:     Days of Exercise per Week:     Minutes of Exercise per Session:    Stress:     Feeling of Stress :    Social Connections:     Frequency of Communication with Friends and Family:     Frequency of Social Gatherings with Friends and Family:     Attends Protestant Services:     Active Member of Clubs or Organizations:     Attends Club or Organization Meetings:     Marital Status:    Intimate Partner Violence:     Fear of Current or Ex-Partner:     Emotionally Abused:     Physically Abused:     Sexually Abused:        Family History   Problem Relation Age of Onset    Stroke Father        Current Outpatient Medications on File Prior to Visit   Medication Sig Dispense Refill    rosuvastatin (CRESTOR) 20 MG tablet Take 1 tablet by mouth daily 30 tablet 3    levothyroxine (SYNTHROID) 100 MCG tablet TAKE 1 TABLET BY MOUTH EVERY DAY 90 tablet 1    calcium carbonate (OSCAL) 500 MG TABS tablet Take 500 mg by mouth daily      magnesium 30 MG tablet Take 30 mg by mouth 2 times daily      estrogens, conjugated,-methyltestosterone (ESTRATEST) 1.25-2.5 MG per tablet TAKE 1 TABLET BY MOUTH EVERY DAY  4    omeprazole (PRILOSEC) 40 MG delayed release capsule TAKE 1 CAPSULE BY MOUTH EVERY DAY 30 capsule 4    PARoxetine (PAXIL) 20 MG tablet       BIOTIN 5000 PO Take 5,000 mg by mouth daily       Multiple Vitamins-Minerals (CENTRUM SILVER PO) Take by mouth       No current facility-administered medications on file prior to visit. Pertinent items are noted in HPI  Review of systems reviewed from Patient History Form dated on 11/25/2020 and available in the patient's chart under the Media tab. No change noted. PHYSICAL EXAMINATION:  Ms. Melvi Varma is a very pleasant 60 y.o.  female who presents today in no acute distress, awake, alert, and oriented. She is well dressed, nourished and  groomed. Patient with normal affect. Height is  5' 4\" (1.626 m), weight is 224 lb (101.6 kg), Body mass index is 38.45 kg/m². Resting respiratory rate is 16. Examination of the gait, showed that the patient walks heel-toe with a non-antalgic gait and no limp. On evaluation of the left shoulder, range of motion is 120 degree in flexion and 120 degree in abduction. There is positve impingement signs. Motor and sensation is intact and symmetric throughout the bilateral upper extremities in the median, ulnar and radial nerve distributions. She has 2+ radial pulses bilaterally. IMAGING: X-rays were taken in the [facility] ED 11/19/2020, 3 views of the left shoulder, and showed no acute fracture. Mild cuff arthropathy. MRI left shoulder dated 5/25/2021 was reviewed and showed     1. Impingement related change in the posterosuperior humerus with overlying infraspinatus    coalescent tendinopathy. 2. Ruptured intraarticular biceps long head. IMPRESSION: Left shoulder cuff tendenopathy with biceps long head tear. PLAN: I discussed with the patient the MRI and the treatment options including both surgical and non-surgical treatment. We recommended continue stretching exercises of the shoulder was taught to the patient today. She will take NSAIDS Mobic. We will have see Dr Tasha Zambrano to discuss further possible surgical treatment options.       Gerard Porter MD

## 2021-06-07 ENCOUNTER — OFFICE VISIT (OUTPATIENT)
Dept: ORTHOPEDIC SURGERY | Age: 56
End: 2021-06-07
Payer: COMMERCIAL

## 2021-06-07 VITALS
BODY MASS INDEX: 38.24 KG/M2 | RESPIRATION RATE: 16 BRPM | DIASTOLIC BLOOD PRESSURE: 89 MMHG | WEIGHT: 224 LBS | SYSTOLIC BLOOD PRESSURE: 141 MMHG | HEIGHT: 64 IN

## 2021-06-07 DIAGNOSIS — S46.212A RUPTURE OF LEFT BICEPS TENDON, INITIAL ENCOUNTER: ICD-10-CM

## 2021-06-07 DIAGNOSIS — M25.512 LEFT SHOULDER PAIN, UNSPECIFIED CHRONICITY: Primary | ICD-10-CM

## 2021-06-07 DIAGNOSIS — M75.42 SHOULDER IMPINGEMENT SYNDROME, LEFT: ICD-10-CM

## 2021-06-07 PROCEDURE — 99243 OFF/OP CNSLTJ NEW/EST LOW 30: CPT | Performed by: ORTHOPAEDIC SURGERY

## 2021-06-07 PROCEDURE — 20610 DRAIN/INJ JOINT/BURSA W/O US: CPT | Performed by: ORTHOPAEDIC SURGERY

## 2021-06-07 RX ORDER — BUPIVACAINE HYDROCHLORIDE 2.5 MG/ML
4 INJECTION, SOLUTION INFILTRATION; PERINEURAL ONCE
Status: COMPLETED | OUTPATIENT
Start: 2021-06-07 | End: 2021-06-07

## 2021-06-07 RX ORDER — LIDOCAINE HYDROCHLORIDE 10 MG/ML
4 INJECTION, SOLUTION INFILTRATION; PERINEURAL ONCE
Status: COMPLETED | OUTPATIENT
Start: 2021-06-07 | End: 2021-06-07

## 2021-06-07 RX ORDER — TRIAMCINOLONE ACETONIDE 40 MG/ML
40 INJECTION, SUSPENSION INTRA-ARTICULAR; INTRAMUSCULAR ONCE
Status: COMPLETED | OUTPATIENT
Start: 2021-06-07 | End: 2021-06-07

## 2021-06-07 RX ADMIN — TRIAMCINOLONE ACETONIDE 40 MG: 40 INJECTION, SUSPENSION INTRA-ARTICULAR; INTRAMUSCULAR at 10:32

## 2021-06-07 RX ADMIN — LIDOCAINE HYDROCHLORIDE 4 ML: 10 INJECTION, SOLUTION INFILTRATION; PERINEURAL at 10:32

## 2021-06-07 RX ADMIN — BUPIVACAINE HYDROCHLORIDE 10 MG: 2.5 INJECTION, SOLUTION INFILTRATION; PERINEURAL at 10:32

## 2021-06-07 NOTE — PROGRESS NOTES
CHIEF COMPLAINT: Left shoulder pain    History:    Sravani Mancuso is a 60 y.o. right handed female referred by Blaire Henry MD for Sport Medicine consultation for evaluation and treatment of Left shoulder pain. This is evaluated as a personal injury. The pain began 8 months ago. Pain is rated as a 8/10. There was not an injury. Pain is located deep in shoulder. It is worse with pushing, FRANTZ position, reaching back. The patient has not had PT. The patient has had a subacromial injection with mild relief. The patient has tried NSAIDs. The patient has not tried ice. Patient's occupation . Outside reports reviewed:  Dr. Codey Zuleta notes.     Past Medical History:   Diagnosis Date    Anxiety     Cancer ([facility] [address] 03/29/2018    Cervical    Hypertension     Hyperthyroidism        Past Surgical History:   Procedure Laterality Date    HYSTERECTOMY  05/14/2018    Robotic-assisted Total Laparoscopic Hysterectomy with Bilateral Salpingo-oophorectomy    TONSILLECTOMY      TUBAL LIGATION         Current Outpatient Medications on File Prior to Visit   Medication Sig Dispense Refill    meloxicam (MOBIC) 7.5 MG tablet Take 1 tablet by mouth daily 30 tablet 0    rosuvastatin (CRESTOR) 20 MG tablet Take 1 tablet by mouth daily 30 tablet 3    levothyroxine (SYNTHROID) 100 MCG tablet TAKE 1 TABLET BY MOUTH EVERY DAY 90 tablet 1    calcium carbonate (OSCAL) 500 MG TABS tablet Take 500 mg by mouth daily      magnesium 30 MG tablet Take 30 mg by mouth 2 times daily      estrogens, conjugated,-methyltestosterone (ESTRATEST) 1.25-2.5 MG per tablet TAKE 1 TABLET BY MOUTH EVERY DAY  4    omeprazole (PRILOSEC) 40 MG delayed release capsule TAKE 1 CAPSULE BY MOUTH EVERY DAY 30 capsule 4    PARoxetine (PAXIL) 20 MG tablet       BIOTIN 5000 PO Take 5,000 mg by mouth daily       Multiple Vitamins-Minerals (CENTRUM SILVER PO) Take by mouth       No current facility-administered medications on file prior to visit. Allergies   Allergen Reactions    Egg Shells      Other reaction(s): GI Upset  Pt states that eating eggs (yolk) makes her sick \"all day long\" where she has to lay down. States is able to take the flu shot. Social History     Socioeconomic History    Marital status:      Spouse name: Not on file    Number of children: Not on file    Years of education: Not on file    Highest education level: Not on file   Occupational History    Not on file   Tobacco Use    Smoking status: Former Smoker     Packs/day: 0.50     Years: 10.00     Pack years: 5.00     Types: Cigarettes     Quit date:      Years since quittin.4    Smokeless tobacco: Never Used   Vaping Use    Vaping Use: Never used   Substance and Sexual Activity    Alcohol use: Never     Comment: Occ    Drug use: No    Sexual activity: Yes   Other Topics Concern    Not on file   Social History Narrative    Not on file     Social Determinants of Health     Financial Resource Strain:     Difficulty of Paying Living Expenses:    Food Insecurity:     Worried About Running Out of Food in the Last Year:     [address] in the Last Year:    Transportation Needs:     Lack of Transportation (Medical):      Lack of Transportation (Non-Medical):    Physical Activity:     Days of Exercise per Week:     Minutes of Exercise per Session:    Stress:     Feeling of Stress :    Social Connections:     Frequency of Communication with Friends and Family:     Frequency of Social Gatherings with Friends and Family:     Attends Sikhism Services:     Active Member of Clubs or Organizations:     Attends Club or Organization Meetings:     Marital Status:    Intimate Partner Violence:     Fear of Current or Ex-Partner:     Emotionally Abused:     Physically Abused:     Sexually Abused:        Family History   Problem Relation Age of Onset    Stroke Father        Review of Systems:   I have reviewed the clinically relevant past medical history, medications, allergies, family history, social history, and 13 point Review of Systems from the patient's recent history form & documented any details relevant to today's presenting complaints in the history above. The patient's self-reported past medical history, medications, allergies, family history, social history, and Review of Systems form from 11/25/20 have been scanned into the chart under the \"Media\" tab. No interval changes. Physical Examination:      Vital signs:  BP (!) 141/89   Resp 16   Ht 5' 4\" (1.626 m)   Wt 224 lb (101.6 kg)   LMP 03/04/2009   BMI 38.45 kg/m²     General:   alert, appears stated age, cooperative and no distress   Left Shoulder   Active ROM:   forward flexion 180, external rotation 45, internal rotation :L4. Right shoulder: forward flexion 180, external rotation 80, internal rotation L4.      Joint Tenderness:   joint line   Neer:   positive   Parkinson:   negative   Strength:   5/5 Supraspinatus, External rotation    Bilateral shoulders   Drop-arm test:   negative   Belly-press test:   negative   Bear-hug test:   negative   Speed's test:   negative   Bicipital groove tenderness:  negative   Calle's test:   positive   Cross-body adduction test:   negative    AC joint tenderness:   negative     There are no skin lesions, cellulitis, or extreme edema in the upper extremities. Sensation is grossly intact to light touch bilaterally upper extremity. The patient has warm and well-perfused Bilateral upper extremities with brisk capillary refill. Imaging   Left Shoulder X-Ray: True AP and axillary view obtained and reviewed. Scapular Y view from outside 11/19/20 independently reviewed. AC Joint: narrowing moderate  Glenohumeral joint: no abnormalities noted  Elevation humeral head: absent    Left Shoulder MRI Proscan 5/25/21: I independently reviewed the images, as well as the radiology report.    1. Impingement related change in the posterosuperior humerus with overlying infraspinatus    coalescent tendinopathy. 2. Ruptured intraarticular biceps long head         Assessment:      Left shoulder impingement  Left shoulder long head biceps tendon rupture  Possible left shoulder SLAP tear      Plan:      Natural history and expected course discussed. Questions answered. The risks and benefits of an injection were discussed with the patient. The patient had full opportunity to ask questions and all were answered. The patient then provided verbal informed consent. The skin was then prepped with betadine solution and alcohol. Under aseptic conditions, the  left glenohumeral joint was injected with 4cc of 1% xylocaine, 4cc of 0.25% marcaine, and 1cc of Kenalog (40mg/ml). There were no immediate complications following the injection. The patient was advised of the possibility of injection site reaction and instructed to apply ice to the area and take NSAIDs if able. PT referral provided. Ice prn. Continue Mobic. Follow up in 2 months. Cory Rizzo. Anabel Turner MD  Orthopaedic Surgery and Sports Medicine     Disclaimer: This note was generated with use of a verbal recognition program (DRAGON) and an attempt was made to check for errors. It is possible that there are still dictated errors within this office note. If so, please bring any significant errors to my attention for an addendum. All efforts were made to ensure that this office note is accurate.

## 2021-06-10 RX ORDER — LISINOPRIL 10 MG/1
10 TABLET ORAL DAILY
Qty: 30 TABLET | Refills: 0 | Status: SHIPPED | OUTPATIENT
Start: 2021-06-10 | End: 2021-07-06

## 2021-06-14 ENCOUNTER — VIRTUAL VISIT (OUTPATIENT)
Dept: FAMILY MEDICINE CLINIC | Age: 56
End: 2021-06-14
Payer: COMMERCIAL

## 2021-06-14 DIAGNOSIS — F41.9 ANXIETY: ICD-10-CM

## 2021-06-14 DIAGNOSIS — C53.9 SQUAMOUS CELL CARCINOMA OF CERVIX (HCC): ICD-10-CM

## 2021-06-14 DIAGNOSIS — I10 ESSENTIAL HYPERTENSION: Primary | ICD-10-CM

## 2021-06-14 DIAGNOSIS — R10.13 DYSPEPSIA: ICD-10-CM

## 2021-06-14 PROCEDURE — 99214 OFFICE O/P EST MOD 30 MIN: CPT | Performed by: FAMILY MEDICINE

## 2021-06-14 RX ORDER — PAROXETINE 30 MG/1
30 TABLET, FILM COATED ORAL DAILY
Qty: 30 TABLET | Refills: 0 | Status: SHIPPED | OUTPATIENT
Start: 2021-06-14 | End: 2021-07-07

## 2021-06-14 RX ORDER — OMEPRAZOLE 40 MG/1
CAPSULE, DELAYED RELEASE ORAL
Qty: 30 CAPSULE | Refills: 0 | Status: SHIPPED | OUTPATIENT
Start: 2021-06-14 | End: 2021-07-07

## 2021-06-14 ASSESSMENT — ENCOUNTER SYMPTOMS
NAUSEA: 0
VOMITING: 0
RHINORRHEA: 0
SORE THROAT: 0
DIARRHEA: 0
ABDOMINAL PAIN: 1
SHORTNESS OF BREATH: 0

## 2021-06-14 NOTE — PROGRESS NOTES
2021    TELEHEALTH EVALUATION -- Audio/Visual (During [facility]-36 public health emergency)    HPI:    Sharona Weiss (:  1965) has requested an audio/video evaluation for the following concern(s):    HTN- patient's 155/94, 146/86, headache, stress, recently  started the mediation abd is feeling better. She stated that her numbers are much improved, no side effects, has been on the medication in the past, denied headache, vision change, or dizziness. Anxiety/depression- has been on Paxil 20 mg for  Years, having increased stress, panic, nervousness, stated that the medication has worked well, no side effects, would like to increase if possible. GERD- not taking her ppi at this time, noticing burning and reflux symptoms, has stopped all NSAIDS but not improving. Cervical cancer- following with GYN and Oncologist, cancer free for 3 years, yearly appointments only at this time. Patient denied chest pain, sob, n, v, or diarrhea. Review of Systems   Constitutional: Negative for activity change, fatigue, fever and unexpected weight change. HENT: Negative for congestion, ear pain, rhinorrhea and sore throat. Respiratory: Negative for shortness of breath. Cardiovascular: Negative for chest pain. Gastrointestinal: Positive for abdominal pain. Negative for diarrhea, nausea and vomiting. Neurological: Positive for headaches. Negative for dizziness and light-headedness. Psychiatric/Behavioral: Negative for dysphoric mood. The patient is nervous/anxious. Prior to Visit Medications    Medication Sig Taking?  Authorizing Provider   omeprazole (PRILOSEC) 40 MG delayed release capsule TAKE 1 CAPSULE BY MOUTH EVERY DAY Yes Soy Phillips,    PARoxetine (PAXIL) 30 MG tablet Take 1 tablet by mouth daily Yes Soy Phillips, DO   lisinopril (PRINIVIL;ZESTRIL) 10 MG tablet Take 1 tablet by mouth daily  Soy Phillips,    rosuvastatin (CRESTOR) 20 MG tablet Take 1 tablet by mouth daily  Lorna Fulton, DO   levothyroxine (SYNTHROID) 100 MCG tablet TAKE 1 TABLET BY MOUTH EVERY DAY  Soy Phillips DO   calcium carbonate (OSCAL) 500 MG TABS tablet Take 500 mg by mouth daily  Historical Provider, MD   magnesium 30 MG tablet Take 30 mg by mouth 2 times daily  Historical Provider, MD   estrogens, conjugated,-methyltestosterone (ESTRATEST) 1.25-2.5 MG per tablet TAKE 1 TABLET BY MOUTH EVERY DAY  Historical Provider, MD   BIOTIN 5000 PO Take 5,000 mg by mouth daily   Historical Provider, MD   Multiple Vitamins-Minerals (CENTRUM SILVER PO) Take by mouth  Historical Provider, MD       Social History     Tobacco Use    Smoking status: Former Smoker     Packs/day: 0.50     Years: 10.00     Pack years: 5.00     Types: Cigarettes     Quit date:      Years since quittin.4    Smokeless tobacco: Never Used   Vaping Use    Vaping Use: Never used   Substance Use Topics    Alcohol use: Never     Comment: Occ    Drug use: No        Allergies   Allergen Reactions    Egg Shells      Other reaction(s): GI Upset  Pt states that eating eggs (yolk) makes her sick \"all day long\" where she has to lay down. States is able to take the flu shot. PHYSICAL EXAMINATION:  [ INSTRUCTIONS:  \"[x]\" Indicates a positive item  \"[]\" Indicates a negative item  -- DELETE ALL ITEMS NOT EXAMINED]  Vital Signs: (As obtained by patient/caregiver or practitioner observation)  See chart  Blood pressure-  Heart rate-    Respiratory rate-    Temperature-  Pulse oximetry-     Constitutional: [x] Appears well-developed and well-nourished [] No apparent distress      [] Abnormal-   Mental status  [x] Alert and awake  [] Oriented to person/place/time []Able to follow commands      Eyes:  EOM    []  Normal  [] Abnormal-  Sclera  [x]  Normal  [] Abnormal -         Discharge []  None visible  [] Abnormal -    HENT:   [x] Normocephalic, atraumatic.   [] Abnormal   [] Mouth/Throat: Mucous membranes are moist.     External Ears [x] Normal  [] Abnormal-     Neck: [] No visualized mass     Pulmonary/Chest: [x] Respiratory effort normal.  [] No visualized signs of difficulty breathing or respiratory distress        [] Abnormal-      Musculoskeletal:   [] Normal gait with no signs of ataxia         [x] Normal range of motion of neck        [] Abnormal-       Neurological:        [] No Facial Asymmetry (Cranial nerve 7 motor function) (limited exam to video visit)          [x] No gaze palsy        [] Abnormal-         Skin:        [x] No significant exanthematous lesions or discoloration noted on facial skin         [] Abnormal-            Psychiatric:       [x] Normal Affect [] No Hallucinations        [] Abnormal-     Other pertinent observable physical exam findings-     ASSESSMENT/PLAN:  1. Dyspepsia  Take medication as prescribed. .   Avoid trigger foods. Discussed side effects of the medication. Please RTC if not improved. - omeprazole (PRILOSEC) 40 MG delayed release capsule; TAKE 1 CAPSULE BY MOUTH EVERY DAY  Dispense: 30 capsule; Refill: 0    2. Essential hypertension  improved controlled. Discussed signs and symptoms for immediate evaluation in the ER. Reduce sodium. Monitor diet, exercise and lose weight. Keep a BP log and bring it to your next appointment. Needs to rtc in one month for BP check    3. Squamous cell carcinoma of cervix ([facility] [address]  Stable  Continue with medication  Keep appointments with specialist.   Abraham Small questions    4. Anxiety  Labs reviewed  Medication increased  Discussed side effects of medication  Discussed signs and symptoms for immediate evaluation in ER. Answered questions. Return in about 4 weeks (around 7/12/2021). Wilman Elise, was evaluated through a synchronous (real-time) audio-video encounter. The patient (or guardian if applicable) is aware that this is a billable service. Verbal consent to proceed has been obtained within the past 12 months.  The visit was conducted

## 2021-06-15 ENCOUNTER — HOSPITAL ENCOUNTER (OUTPATIENT)
Dept: PHYSICAL THERAPY | Age: 56
Setting detail: THERAPIES SERIES
Discharge: HOME | End: 2021-06-15
Payer: COMMERCIAL

## 2021-06-15 PROCEDURE — 97161 PT EVAL LOW COMPLEX 20 MIN: CPT

## 2021-06-15 PROCEDURE — 97110 THERAPEUTIC EXERCISES: CPT

## 2021-06-15 PROCEDURE — 97530 THERAPEUTIC ACTIVITIES: CPT

## 2021-06-15 NOTE — PLAN OF CARE
[facility], [facility] [place] [address]  Phone: [phone]   Fax:     [phone]                                                       Physical Therapy Certification    Dear Referring Practitioner: Chelo Mendenhall. Liam Trinh MD,    We had the pleasure of evaluating the following patient for physical therapy services at [facility]. A summary of our findings can be found in the initial assessment below. This includes our plan of care. If you have any questions or concerns regarding these findings, please do not hesitate to contact me at the office phone number checked above. Thank you for the referral.       Physician Signature:_______________________________Date:__________________  By signing above (or electronic signature), therapists plan is approved by physician        Patient: Miguel Michel   : 1965   MRN: [id number]  Referring Physician: Referring Practitioner: Chelo Mendenhall. Liam Trinh MD      Evaluation Date: 6/15/2021      Medical Diagnosis Information:  Diagnosis: Left Shoulder pain, unspecified chronicity M25.512                                             Insurance information: PT Insurance Information: [facility], allowed 20 visits per calendar year, does not require prior authorization    Precautions/ Contra-indications:   Latex Allergy:   [x]  NO      []YES  Preferred Language for Healthcare:   [x]English       []other:    C-SSRS Triggered by Intake questionnaire (Past 2 wk assessment ):   [x] No, Questionnaire did not trigger screening.   [] Yes, Patient intake triggered C-SSRS Screening      [] C-SSRS Screening completed  [] PCP notified via Epic     SUBJECTIVE: Patient reports a history of left shoulder pain since 2020, given an injection by , minimal improvement.   Pain continued, sometimes worse, went back to Dr. Tiago Ann, referred to  65 85             Strength  (Left) Right   Shoulder Flex 4/5* 5/5   Shoulder Ex 4+/5 5/5   Shoulder Abd 4/5* 5/5   Shoulder ER 4/5* 5/5   Shoulder IR 4+/5 5/5   Biceps 4/5* 5/5   Triceps 4+/5 5/5       Sensation:    [x]Dermatomes/Myotomes intact        Joint mobility:   []Normal    [x]Hypo decreased inf, post glides G-H joint   []Hyper                           [x] Patient history, allergies, meds reviewed. Medical chart reviewed. See intake form. Review Of Systems (ROS):  [x]Performed Review of systems (Integumentary, CardioPulmonary, Neurological) by intake and observation. Intake form has been scanned into medical record. Patient has been instructed to contact their primary care physician regarding ROS issues if not already being addressed at this time.       Co-morbidities/Complexities (which will affect course of rehabilitation):   []None           Arthritic conditions   []Rheumatoid arthritis (M05.9)  []Osteoarthritis (M19.91)   Cardiovascular conditions   [x]Hypertension (I10)  []Hyperlipidemia (E78.5)  []Angina pectoris (I20)  []Atherosclerosis (I70)  []CVA Musculoskeletal conditions   []Disc pathology   []Congenital spine pathologies   []Prior surgical intervention  []Osteoporosis (M81.8)  []Osteopenia (M85.8)   Endocrine conditions   []Hypothyroid (E03.9)  []Hyperthyroid Gastrointestinal conditions   []Constipation (A88.40)   Metabolic conditions   []Morbid obesity (E66.01)  []Diabetes type 1(E10.65) or 2 (E11.65)   []Neuropathy (G60.9)     Pulmonary conditions   []Asthma (J45)  []Coughing   []COPD (J44.9)   Psychological Disorders  [x]Anxiety (F41.9)  []Depression (F32.9)   []Other:   []Other:     Cervical Cancer, hysterectomy     Barriers to/and or personal factors that will affect rehab potential:              []Age  []Sex   []Smoker              []Motivation/Lack of Motivation                        [x]Co-Morbidities              []Cognitive Function, education/learning barriers []Environmental, home barriers              []profession/work barriers  []past PT/medical experience  []other:  Justification:     Falls Risk Assessment (30 days):   [x] Falls Risk assessed and no intervention required. [] Falls Risk assessed and Patient requires intervention due to being higher risk   TUG score (>12s at risk):     [] Falls education provided, including         ASSESSMENT:Patient presents with decreased functional mobility consistent with biceps tear and decreased control of L upper quarter   Functional Impairments:     []Noted spinal or UE joint hypomobility   []Noted spinal or UE joint hypermobility   [x]Decreased spinal/UE functional ROM   []Abnormal reflexes/sensation/myotomal/dermatomal deficits   [x]Decreased RC/scapular/core strength and neuromuscular control    [x]Decreased UE functional strength   []other:      Functional Activity Limitations (from functional questionnaire and intake)   []Reduced ability to tolerate prolonged functional positions   []Reduced ability or difficulty with changes of positions or transfers between positions   []Reduced ability to maintain good posture and demonstrate good body mechanics with sitting, bending, and lifting   [] Reduced ability or tolerance with driving and/or computer work   [x]Reduced ability to perform lifting, reaching, carrying tasks   [x]Reduced ability to reach behind back   [x]Reduced ability to sleep    [x]Reduced ability to tolerate any impact through UE or spine   []Reduced ability to  or hold objects   [x]Reduced ability to throw or toss an object   []other:    Participation Restrictions   [x]Reduced participation in self care activities   [x]Reduced participation in home management activities   []Reduced participation in work activities   []Reduced participation in social activities. []Reduced participation in sport/recreational activities.     Classification/Subgrouping:   []signs/symptoms consistent with post-surgical status including decreased ROM, strength and function.     []signs/symptoms consistent with joint sprain/strain    []signs/symptoms consistent with shoulder impingement (internal, external, primary or secondary)   []signs/symptoms consistent with shoulder/elbow/wrist tendinopathy   [x]Signs/symptoms consistent with Rotator cuff tear/biceps tear   []sign/symptoms consistent with labral tear   []signs/symptoms consistent with rib dysfunction   []signs/symptoms consistent with postural dysfunction   []signs/symptoms consistent with Glenohumeral IR Deficit - <45 degrees   []signs/symptoms consistent with facet dysfunction of cervical/thoracic spine   []signs/symptoms consistent with pathology which may benefit from Dry Needling   []signs/symptoms which may limit the use of advanced manual therapy techniques: (Elevated CV risk profile, recent trauma, intolerance to end range positions, prior TIA, visual issues, UE neurological compromise )     Prognosis/Rehab Potential:      []Excellent   [x]Good    [x]Fair   []Poor    Tolerance of evaluation/treatment:    []Excellent   []Good    [x]Fair   []Poor    Physical Therapy Evaluation Complexity Justification  [x] A history of present problem with:  [] no personal factors and/or comorbidities that impact the plan of care;  [x]1-2 personal factors and/or comorbidities that impact the plan of care  []3 personal factors and/or comorbidities that impact the plan of care  [x] An examination of body systems using standardized tests and measures addressing any of the following: body structures and functions (impairments), activity limitations, and/or participation restrictions;:  [] a total of 1-2 or more elements   [x] a total of 3 or more elements   [] a total of 4 or more elements   [x] A clinical presentation with:  [] stable and/or uncomplicated characteristics   [] evolving clinical presentation with changing characteristics  [] unstable and unpredictable characteristics;   [x] Clinical decision making of [] low, [] moderate, [] high complexity using standardized patient assessment instrument and/or measurable assessment of functional outcome. [] EVAL (LOW) 88153 (typically 20 minutes face-to-face)  [] EVAL (MOD) 70702 (typically 30 minutes face-to-face)  [] EVAL (HIGH) 97589 (typically 45 minutes face-to-face)  [] RE-EVAL     PLAN: Will initiate UE [place], theraslide row, lat pull down, ext, soft tissue mob, manual stretch, joint mob G-H joint (grades 1-2), progression of HEP. Frequency/Duration: 1-3  days per week for  4-6Weeks:  Interventions:  [x]  Therapeutic exercise including: strength training, ROM, for scapula, core and Upper extremity, including postural re-education. [x]  NMR activation and proprioception for UE, periscapular and RC muscles and Core, including postural re-education. [x]  Manual therapy as indicated for shoulder, scapula, spine and associated soft tissue including: Dry Needling/IASTM, STM, PROM, Gr I-IV mobilizations, manipulation. [x] Modalities as needed that may include: thermal agents, E-stim, Biofeedback, US, iontophoresis as indicated  [x] Patient education on joint protection, postural re-education, activity modification, progression of HEP. HEP instruction:  Patient instructed in lower trap sets, Codman's pendulum, wall slide with step flex, wall slide with step scaption; written instructions with pictures issued, patient able to demonstrate exercises. GOALS:  Patient stated goal: \"moving\"  [] Progressing: [] Met: [] Not Met: [] Adjusted    Therapist goals for Patient:   Short Term Goals: To be achieved in: 2-4 weeks  1. Independent in HEP and progression per patient tolerance, in order to prevent re-injury. [] Progressing: [] Met: [] Not Met: [] Adjusted  2. Patient will have a decrease in pain to facilitate improvement in movement, function, and ADLs as indicated by Functional Deficits.   [] Progressing: [] Met: [] Not Met: [] Adjusted    Long Term Goals: To be achieved in at discharge:  1. Disability index score of 20% or less for the Quick DASH to assist with reaching prior level of function. [] Progressing: [] Met: [] Not Met: [] Adjusted  2. Patient will demonstrate increased AROM to WNL to allow for proper joint functioning as indicated by Functional Deficits. [] Progressing: [] Met: [] Not Met: [] Adjusted  3. Patient will demonstrate an increase in NM recruitment/activation and overall GH and scapular strength to within WNL for proper functional mobility as indicated by patients Functional Deficits. [] Progressing: [] Met: [] Not Met: [] Adjusted  4. Patient will return to ADLs/ chores/work activities without increased symptoms or restriction. [] Progressing: [] Met: [] Not Met: [] Adjusted      Electronically signed by:  Venu Briscoe, PT [id number]      Note: If patient does not return for scheduled/recommended follow up visits, this note will serve as a discharge from care along with the most recent update on progress.

## 2021-06-15 NOTE — FLOWSHEET NOTE
behind back, sometimes pulling pants up, reaching into wall cabinet, moving wet laundry, pulling car door shut, putting on seat belt if uses L UE, sleeping feels better to sleep on L UE, occasional sharp pop when moving it. Patient reports L hip and knee pain, L PSIS     Relevant Medical History: HBP, cancer cervical 2018 hysterectomy, anxiety, covid-19 (January 1, 2021)      Functional Scale/Score: QuickDASH:  34 = 52.27% Disability (6/15/21)     Pain Scale:0 /10 at rest, 8/10 with activity      SUBJECTIVE:  See eval    OBJECTIVE:    Observation:    Test measurements:      RESTRICTIONS/PRECAUTIONS: rupture of long head of biceps    Exercises/Interventions:   Therapeutic Ex (86760)  Min:10 Resistance/Repetitions CUES/Notes   Home Ex Program  See below                  NMR re-education (03991)  Min:               Therapeutic Activity (41559)  Min:10     Patient education  See below        Manual Intervention  (35132)  Min:                         Modalities:  Min                 Other Therapeutic Activities:  Pt was educated on PT POC, Diagnosis, Prognosis, pathomechanics as well as frequency and duration of scheduling future physical therapy appointments. Time was also taken on this day to answer all patient questions and participation in PT. Reviewed appointment policy in detail with patient and patient verbalized understanding. Discussed at length anatomy and biomechanics of the shoulder, muscle reeducation, motor recruitment.     Home Exercise Program:  Patient instructed in lower trap sets, Codman's pendulum, wall slide with step flex, wall slide with step scaption; written instructions with pictures issued, patient able to demonstrate exercises.        Therapeutic Exercise and NMR EXR  [x] (98045) Provided verbal/tactile cueing for activities related to strengthening, flexibility, endurance, ROM  for improvements in scapular, scapulothoracic and UE control with self care, reaching, carrying, lifting, house/yardwork, driving/computer work.    [] (39325) Provided verbal/tactile cueing for activities related to improving balance, coordination, kinesthetic sense, posture, motor skill, proprioception  to assist with  scapular, scapulothoracic and UE control with self care, reaching, carrying, lifting, house/yardwork, driving/computer work. Therapeutic Activities:    [x] (39151 or 47769) Provided verbal/tactile cueing for activities related to improving balance, coordination, kinesthetic sense, posture, motor skill, proprioception and motor activation to allow for proper function of scapular, scapulothoracic and UE control with self care, carrying, lifting, driving/computer work.      Home Exercise Program:    [x] (93027) Reviewed/Progressed HEP activities related to strengthening, flexibility, endurance, ROM of scapular, scapulothoracic and UE control with self care, reaching, carrying, lifting, house/yardwork, driving/computer work  [] (21119) Reviewed/Progressed HEP activities related to improving balance, coordination, kinesthetic sense, posture, motor skill, proprioception of scapular, scapulothoracic and UE control with self care, reaching, carrying, lifting, house/yardwork, driving/computer work      Manual Treatments:  PROM / STM / Oscillations-Mobs:  G-I, II, III, IV (PA's, Inf., Post.)  [] (69363) Provided manual therapy to mobilize soft tissue/joints of cervical/CT, scapular GHJ and UE for the purpose of modulating pain, promoting relaxation,  increasing ROM, reducing/eliminating soft tissue swelling/inflammation/restriction, improving soft tissue extensibility and allowing for proper ROM for normal function with self care, reaching, carrying, lifting, house/yardwork, driving/computer work    Charges:  Timed Code Treatment Minutes: 20   Total Treatment Minutes: 45       [x] EVAL (LOW) 56094 (typically 20 minutes face-to-face)  [] EVAL (MOD) 19501 (typically 30 minutes face-to-face)  [] EVAL (HIGH) 30914 (typically 45 minutes face-to-face)  [] RE-EVAL     [x] OI(17229) x     [] Dry needle 1 or 2 Muscles (74768)  [] NMR (23951) x     [] Dry needle 3+ Muscles (93024)  [] Manual (03282) x     [] Ultrasound (51673) x  [x] TA (35754) x     [] Mech Traction (31647)  [] ES(attended) (95060)     [] ES (un) (84543):   [] Vasopump (96230) [] Ionto (19965)   [] Other:          GOALS:  Patient stated goal: \"moving\"  []? Progressing: []? Met: []? Not Met: []? Adjusted     Therapist goals for Patient:   Short Term Goals: To be achieved in: 2-4 weeks  1. Independent in HEP and progression per patient tolerance, in order to prevent re-injury. []? Progressing: []? Met: []? Not Met: []? Adjusted  2. Patient will have a decrease in pain to facilitate improvement in movement, function, and ADLs as indicated by Functional Deficits. []? Progressing: []? Met: []? Not Met: []? Adjusted     Long Term Goals: To be achieved in at discharge:  1. Disability index score of 20% or less for the Quick DASH to assist with reaching prior level of function. []? Progressing: []? Met: []? Not Met: []? Adjusted  2. Patient will demonstrate increased AROM to WNL to allow for proper joint functioning as indicated by Functional Deficits. []? Progressing: []? Met: []? Not Met: []? Adjusted  3. Patient will demonstrate an increase in NM recruitment/activation and overall GH and scapular strength to within WNL for proper functional mobility as indicated by patients Functional Deficits. []? Progressing: []? Met: []? Not Met: []? Adjusted  4. Patient will return to ADLs/ chores/work activities without increased symptoms or restriction. []? Progressing: []? Met: []? Not Met: []?  Adjusted       ASSESSMENT:  Patient presents with decreased functional mobility consistent with biceps tear and decreased control of L upper quarter            Treatment/Activity Tolerance:  [x] Patient tolerated treatment well [] Patient limited by francois  [] Patient limited by pain  [] Patient limited by other medical complications  [] Other:     Overall Progression Towards Functional goals/ Treatment Progress Update:  [] Patient is progressing as expected towards functional goals listed. [] Progression is slowed due to complexities/Impairments listed. [] Progression has been slowed due to co-morbidities. [x] Plan just implemented, too soon to assess goals progression <30days   [] Goals require adjustment due to lack of progress  [] Patient is not progressing as expected and requires additional follow up with physician  [] Other    Prognosis for POC: [x] Good [] Fair  [] Poor    Patient requires continued skilled intervention: [x] Yes  [] No      PLAN:Will initiate UE [place], theraslide row, lat pull down, ext, soft tissue mob, manual stretch, joint mob G-H joint (grades 1-2), progression of HEP. [] Continue per plan of care [] Alter current plan (see comments)  [x] Plan of care initiated [] Hold pending MD visit [] Discharge    Electronically signed by: Brodie Douglas, PT [id number]    Note: If patient does not return for scheduled/recommended follow up visits, this note will serve as a discharge from care along with the most recent update on progress.

## 2021-06-18 RX ORDER — ROSUVASTATIN CALCIUM 20 MG/1
TABLET, COATED ORAL
Qty: 90 TABLET | Refills: 1 | Status: SHIPPED | OUTPATIENT
Start: 2021-06-18 | End: 2021-10-20 | Stop reason: SDUPTHER

## 2021-06-23 ENCOUNTER — HOSPITAL ENCOUNTER (OUTPATIENT)
Dept: PHYSICAL THERAPY | Age: 56
Setting detail: THERAPIES SERIES
Discharge: HOME | End: 2021-06-23
Payer: COMMERCIAL

## 2021-06-23 PROCEDURE — 97140 MANUAL THERAPY 1/> REGIONS: CPT

## 2021-06-23 PROCEDURE — 97110 THERAPEUTIC EXERCISES: CPT

## 2021-06-23 NOTE — FLOWSHEET NOTE
[facility] Room [address]  Phone: [phone]   Fax:     [phone]        Physical Therapy Treatment Note/ Progress Report:       Date:  2021    Patient Name:  Amelia Ba    :  1965  MRN: [id number]    Pertinent Medical History:     Medical/Treatment Diagnosis Information:  · Diagnosis: Left Shoulder pain, unspecified chronicity M25.512       Insurance/Certification information:  PT Insurance Information: [facility], allowed 20 visits per calendar year, does not require prior authorization  Physician Information:  Referring Practitioner: Heaven Veras. Sophy Cantrell MD  Plan of care signed (Y/N): Cosign requested    Date of Patient follow up with Physician:      Progress Report: []  Yes  [x]  No     Date Range for reporting period:  Beginning:    Ending:      Progress report due (10 Rx/or 30 days whichever is less):      Recertification due (POC duration/ or 90 days whichever is less):      Visit # POC/Insurance Allowable Auth Needed   2  []Yes    [x]No       History of Injury:Patient reports a history of left shoulder pain since 2020, given an injection by , minimal improvement. Pain continued, sometimes worse, went back to Dr. Alejandro Burns, referred to Dr. Sophy Cantrell. \"If I  my granddaughter (3year old) it hurts for a few days. \" Patient sees granddaughter frequently.      MRI left shoulder dated 2021 showed      1. Impingement related change in the posterosuperior humerus with overlying infraspinatus    coalescent tendinopathy. 2. Ruptured intraarticular biceps long head.         Patient saw Dr. Sophy Cantrell, injected shoulder, referred to PT to try to avoid surgery. Patient reports shoulder is feeling a little better since injection by Dr. Sophy Cantrell.   Currently pain is intermittent, movement and activity related - lifting, reaching, bra off and on, painful to reach behind back, sometimes pulling pants up, reaching into wall cabinet, moving wet laundry, pulling car door shut, putting on seat belt if uses L UE, sleeping feels better to sleep on L UE, occasional sharp pop when moving it. Patient reports L hip and knee pain, L PSIS     Relevant Medical History: HBP, cancer cervical 2018 hysterectomy, anxiety, covid-19 (January 1, 2021)      Functional Scale/Score: QuickDASH:  34 = 52.27% Disability (6/15/21)     Pain Scale:0 /10 at rest, 5/10 with activity      SUBJECTIVE:    6/23/21:  Patient reports she has been avoiding movements that hurt, has had some pain with pulling pants up, lifting 50 pound bag, etc.      OBJECTIVE:    Observation:    Test measurements:      RESTRICTIONS/PRECAUTIONS: rupture of long head of biceps    Exercises/Interventions:   Therapeutic Ex (09017)  Min:15 Resistance/Repetitions CUES/Notes   Home Ex Program  See below   UE [place] Flexion, 1 x 10 reps standing   Gym Ball on Table Flexion, 1 x 10 reps,  Scaption, 1 x 10 reps standing   Pulleys Flexion, 1 x 10 reps sitting   NMR re-education (92895)  Min:               Therapeutic Activity (49547)  Min:10     Patient education  See below        Manual Intervention  ([id number])  Min:25     Soft tissue mob L upper quarter    Joint Mob Inf, post glides, Grade 2    Manual stretch All motions x 5 reps    Arm pull L UE    Modalities:  Min     Kinesiotape Rotator cuff support \"I\" and \"V\", 2.5 squares           Other Therapeutic Activities:  Pt was educated on PT POC, Diagnosis, Prognosis, pathomechanics as well as frequency and duration of scheduling future physical therapy appointments. Time was also taken on this day to answer all patient questions and participation in PT. Reviewed appointment policy in detail with patient and patient verbalized understanding. Discussed at length anatomy and biomechanics of the shoulder, muscle reeducation, motor recruitment.     Home Exercise Program:  Patient instructed in lower trap sets, Codman's pendulum, wall slide with step flex, wall slide with step scaption; written instructions with pictures issued, patient able to demonstrate exercises.        Therapeutic Exercise and NMR EXR  [x] (16789) Provided verbal/tactile cueing for activities related to strengthening, flexibility, endurance, ROM  for improvements in scapular, scapulothoracic and UE control with self care, reaching, carrying, lifting, house/yardwork, driving/computer work.    [] (24209) Provided verbal/tactile cueing for activities related to improving balance, coordination, kinesthetic sense, posture, motor skill, proprioception  to assist with  scapular, scapulothoracic and UE control with self care, reaching, carrying, lifting, house/yardwork, driving/computer work. Therapeutic Activities:    [x] (59237 or 04724) Provided verbal/tactile cueing for activities related to improving balance, coordination, kinesthetic sense, posture, motor skill, proprioception and motor activation to allow for proper function of scapular, scapulothoracic and UE control with self care, carrying, lifting, driving/computer work.      Home Exercise Program:    [x] (21501) Reviewed/Progressed HEP activities related to strengthening, flexibility, endurance, ROM of scapular, scapulothoracic and UE control with self care, reaching, carrying, lifting, house/yardwork, driving/computer work  [] (55203) Reviewed/Progressed HEP activities related to improving balance, coordination, kinesthetic sense, posture, motor skill, proprioception of scapular, scapulothoracic and UE control with self care, reaching, carrying, lifting, house/yardwork, driving/computer work      Manual Treatments:  PROM / STM / Oscillations-Mobs:  G-I, II, III, IV (PA's, Inf., Post.)  [] (04887) Provided manual therapy to mobilize soft tissue/joints of cervical/CT, scapular GHJ and UE for the purpose of modulating pain, promoting relaxation,  increasing ROM, reducing/eliminating soft tissue swelling/inflammation/restriction, improving soft tissue extensibility and allowing for proper ROM for normal function with self care, reaching, carrying, lifting, house/yardwork, driving/computer work    Charges:  Timed Code Treatment Minutes: 40   Total Treatment Minutes: 40       [] EVAL (LOW) 74005 (typically 20 minutes face-to-face)  [] EVAL (MOD) 70809 (typically 30 minutes face-to-face)  [] EVAL (HIGH) 55404 (typically 45 minutes face-to-face)  [] RE-EVAL     [x] SD(23069) x     [] Dry needle 1 or 2 Muscles (97181)  [] NMR (37960) x     [] Dry needle 3+ Muscles (58985)  [x] Manual (39620) x  2   [] Ultrasound (10289) x  [] TA (74839) x     [] Mech Traction (27394)  [] ES(attended) (54074)     [] ES (un) (81514):   [] Vasopump (86338) [] Ionto (55709)   [] Other:          GOALS:  Patient stated goal: \"moving\"  []? Progressing: []? Met: []? Not Met: []? Adjusted     Therapist goals for Patient:   Short Term Goals: To be achieved in: 2-4 weeks  1. Independent in HEP and progression per patient tolerance, in order to prevent re-injury. []? Progressing: []? Met: []? Not Met: []? Adjusted  2. Patient will have a decrease in pain to facilitate improvement in movement, function, and ADLs as indicated by Functional Deficits. []? Progressing: []? Met: []? Not Met: []? Adjusted     Long Term Goals: To be achieved in at discharge:  1. Disability index score of 20% or less for the Quick DASH to assist with reaching prior level of function. []? Progressing: []? Met: []? Not Met: []? Adjusted  2. Patient will demonstrate increased AROM to WNL to allow for proper joint functioning as indicated by Functional Deficits. []? Progressing: []? Met: []? Not Met: []? Adjusted  3. Patient will demonstrate an increase in NM recruitment/activation and overall GH and scapular strength to within WNL for proper functional mobility as indicated by patients Functional Deficits. []? Progressing: []? Met: []?  Not Met: []? Adjusted  4. Patient will return to ADLs/ chores/work activities without increased symptoms or restriction. []? Progressing: []? Met: []? Not Met: []? Adjusted       ASSESSMENT:  Patient presents with decreased functional mobility consistent with biceps tear and decreased control of L upper quarter            Treatment/Activity Tolerance:  [x] Patient tolerated treatment well [] Patient limited by fatique  [] Patient limited by pain  [] Patient limited by other medical complications  [] Other:     Overall Progression Towards Functional goals/ Treatment Progress Update:  [] Patient is progressing as expected towards functional goals listed. [] Progression is slowed due to complexities/Impairments listed. [] Progression has been slowed due to co-morbidities. [x] Plan just implemented, too soon to assess goals progression <30days   [] Goals require adjustment due to lack of progress  [] Patient is not progressing as expected and requires additional follow up with physician  [] Other    Prognosis for POC: [x] Good [] Fair  [] Poor    Patient requires continued skilled intervention: [x] Yes  [] No      PLAN:Will initiate theraslide row, lat pull down, ext,  progression of HEP. [] Continue per plan of care [] Alter current plan (see comments)  [x] Plan of care initiated [] Hold pending MD visit [] Discharge    Electronically signed by: Sherrie Shaw, PT [id number]    Note: If patient does not return for scheduled/recommended follow up visits, this note will serve as a discharge from care along with the most recent update on progress.

## 2021-06-25 ENCOUNTER — HOSPITAL ENCOUNTER (OUTPATIENT)
Dept: PHYSICAL THERAPY | Age: 56
Setting detail: THERAPIES SERIES
Discharge: HOME | End: 2021-06-25
Payer: COMMERCIAL

## 2021-06-25 PROCEDURE — 97112 NEUROMUSCULAR REEDUCATION: CPT

## 2021-06-25 PROCEDURE — 97140 MANUAL THERAPY 1/> REGIONS: CPT

## 2021-06-25 PROCEDURE — 97110 THERAPEUTIC EXERCISES: CPT

## 2021-06-25 NOTE — FLOWSHEET NOTE
[facility] [address] [place] [address]  Phone: [phone]   Fax:     [phone]        Physical Therapy Treatment Note/ Progress Report:       Date:  2021    Patient Name:  Tere Schilder    :  1965  MRN: [id number]    Pertinent Medical History:     Medical/Treatment Diagnosis Information:  · Diagnosis: Left Shoulder pain, unspecified chronicity M25.512       Insurance/Certification information:  PT Insurance Information: [facility], allowed 20 visits per calendar year, does not require prior authorization  Physician Information:  Referring Practitioner: Naty Ramirez. Marlyn Trent MD  Plan of care signed (Y/N): Cosign requested    Date of Patient follow up with Physician:      Progress Report: []  Yes  [x]  No     Date Range for reporting period:  Beginning:    Ending:      Progress report due (10 Rx/or 30 days whichever is less):      Recertification due (POC duration/ or 90 days whichever is less):      Visit # POC/Insurance Allowable Auth Needed   3  []Yes    [x]No       History of Injury:Patient reports a history of left shoulder pain since 2020, given an injection by , minimal improvement. Pain continued, sometimes worse, went back to Dr. Daisy Hawkins, referred to Dr. Marlyn Trent. \"If I  my granddaughter (3year old) it hurts for a few days. \" Patient sees granddaughter frequently.      MRI left shoulder dated 2021 showed      1. Impingement related change in the posterosuperior humerus with overlying infraspinatus    coalescent tendinopathy. 2. Ruptured intraarticular biceps long head.         Patient saw Dr. Marlyn Trent, injected shoulder, referred to PT to try to avoid surgery. Patient reports shoulder is feeling a little better since injection by Dr. Marlyn Trent.   Currently pain is intermittent, movement and activity related - lifting, reaching, bra off and on, painful to reach behind back, sometimes pulling pants up, reaching into wall cabinet, moving wet laundry, pulling car door shut, putting on seat belt if uses L UE, sleeping feels better to sleep on L UE, occasional sharp pop when moving it. Patient reports L hip and knee pain, L PSIS     Relevant Medical History: HBP, cancer cervical 2018 hysterectomy, anxiety, covid-19 (January 1, 2021)      Functional Scale/Score: QuickDASH:  34 = 52.27% Disability (6/15/21)     Pain Scale:0-4 /10 at rest, 9-10/10 with activity      SUBJECTIVE:    6/23/21:  Patient reports she has been avoiding movements that hurt, has had some pain with pulling pants up, lifting 50 pound bag, etc.   6/25/21:  Patient reports arm was doing well, however she went to son's house to see new granddaughter, and while holding granddaughter, and 140 pound dog moved towards L arm 3 x pushing arm back. Patient reports shoulder was very sore the rest of the evening and this arm.     OBJECTIVE:    Observation: tenderness noted along long and short heads of biceps and prox 1/3 of biceps; small \"click\" or \"pop\" noted with PROM at 90 degrees of flex x 1 rep, then at 110 degrees of flex x 1 rep   Test measurements:      RESTRICTIONS/PRECAUTIONS: rupture of long head of biceps    Exercises/Interventions:   Therapeutic Ex (46661)  Min:15 Resistance/Repetitions CUES/Notes   Home Ex Program  See below   UE [place] Flexion, 1 x 10 reps standing   Gym Ball on Table Flexion, 1 x 10 reps,  Scaption, 1 x 10 reps standing   Pulleys Flexion, 1 x 10 reps sitting   NMR re-education (93170)  Min: 10     Theraslide Lat Pull down, 1 x 10 reps, yellow  Row, 1 x 10 reps, yellow  Extension, 1 x 10 reps,yellow standing        Therapeutic Activity (75202)  Min:10     Patient education  See below        Manual Intervention  ([id number])  Min:20     Soft tissue mob L upper quarter    Joint Mob Inf, post glides, Grade 2    Manual stretch All motions x 5 reps    Arm pull L UE    Modalities:  Min Kinesiotape Rotator cuff support \"I\" and \"V\", 2.5 squares     1/2 strip x 2.5 squares \"I\" along biceps      Other Therapeutic Activities:  Pt was educated on PT POC, Diagnosis, Prognosis, pathomechanics as well as frequency and duration of scheduling future physical therapy appointments. Time was also taken on this day to answer all patient questions and participation in PT. Reviewed appointment policy in detail with patient and patient verbalized understanding. Discussed at length anatomy and biomechanics of the shoulder, muscle reeducation, motor recruitment. Home Exercise Program:  Patient instructed in lower trap sets, Codman's pendulum, wall slide with step flex, wall slide with step scaption; written instructions with pictures issued, patient able to demonstrate exercises.        Therapeutic Exercise and NMR EXR  [x] (39407) Provided verbal/tactile cueing for activities related to strengthening, flexibility, endurance, ROM  for improvements in scapular, scapulothoracic and UE control with self care, reaching, carrying, lifting, house/yardwork, driving/computer work.    [] (51397) Provided verbal/tactile cueing for activities related to improving balance, coordination, kinesthetic sense, posture, motor skill, proprioception  to assist with  scapular, scapulothoracic and UE control with self care, reaching, carrying, lifting, house/yardwork, driving/computer work. Therapeutic Activities:    [x] (91938 or 56534) Provided verbal/tactile cueing for activities related to improving balance, coordination, kinesthetic sense, posture, motor skill, proprioception and motor activation to allow for proper function of scapular, scapulothoracic and UE control with self care, carrying, lifting, driving/computer work.      Home Exercise Program:    [x] (02522) Reviewed/Progressed HEP activities related to strengthening, flexibility, endurance, ROM of scapular, scapulothoracic and UE control with self care, reaching, carrying, lifting, house/yardwork, driving/computer work  [] (90145) Reviewed/Progressed HEP activities related to improving balance, coordination, kinesthetic sense, posture, motor skill, proprioception of scapular, scapulothoracic and UE control with self care, reaching, carrying, lifting, house/yardwork, driving/computer work      Manual Treatments:  PROM / STM / Oscillations-Mobs:  G-I, II, III, IV (PA's, Inf., Post.)  [x] (06920) Provided manual therapy to mobilize soft tissue/joints of cervical/CT, scapular GHJ and UE for the purpose of modulating pain, promoting relaxation,  increasing ROM, reducing/eliminating soft tissue swelling/inflammation/restriction, improving soft tissue extensibility and allowing for proper ROM for normal function with self care, reaching, carrying, lifting, house/yardwork, driving/computer work    Charges:  Timed Code Treatment Minutes: 45   Total Treatment Minutes: 45       [] EVAL (LOW) 84584 (typically 20 minutes face-to-face)  [] EVAL (MOD) 60661 (typically 30 minutes face-to-face)  [] EVAL (HIGH) 98517 (typically 45 minutes face-to-face)  [] RE-EVAL     [x] IL(50399) x     [] Dry needle 1 or 2 Muscles (14097)  [x] NMR (65141) x     [] Dry needle 3+ Muscles (35988)  [x] Manual (16022) x    [] Ultrasound (53641) x  [] TA (12536) x     [] Mech Traction (69801)  [] ES(attended) (59388)     [] ES (un) (71425):   [] Vasopump (84803) [] Ionto (22366)   [] Other:          GOALS:  Patient stated goal: \"moving\"  []? Progressing: []? Met: []? Not Met: []? Adjusted     Therapist goals for Patient:   Short Term Goals: To be achieved in: 2-4 weeks  1. Independent in HEP and progression per patient tolerance, in order to prevent re-injury. []? Progressing: []? Met: []? Not Met: []? Adjusted  2. Patient will have a decrease in pain to facilitate improvement in movement, function, and ADLs as indicated by Functional Deficits. []? Progressing: []? Met: []? Not Met: []?  Adjusted     Long Term Goals: To be achieved in at discharge:  1. Disability index score of 20% or less for the Quick DASH to assist with reaching prior level of function. []? Progressing: []? Met: []? Not Met: []? Adjusted  2. Patient will demonstrate increased AROM to WNL to allow for proper joint functioning as indicated by Functional Deficits. []? Progressing: []? Met: []? Not Met: []? Adjusted  3. Patient will demonstrate an increase in NM recruitment/activation and overall GH and scapular strength to within WNL for proper functional mobility as indicated by patients Functional Deficits. []? Progressing: []? Met: []? Not Met: []? Adjusted  4. Patient will return to ADLs/ chores/work activities without increased symptoms or restriction. []? Progressing: []? Met: []? Not Met: []? Adjusted       ASSESSMENT:  Patient presents with decreased functional mobility consistent with biceps tear and decreased control of L upper quarter            Treatment/Activity Tolerance:  [x] Patient tolerated treatment well [] Patient limited by fatique  [] Patient limited by pain  [] Patient limited by other medical complications  [] Other:     Overall Progression Towards Functional goals/ Treatment Progress Update:  [] Patient is progressing as expected towards functional goals listed. [] Progression is slowed due to complexities/Impairments listed. [] Progression has been slowed due to co-morbidities. [x] Plan just implemented, too soon to assess goals progression <30days   [] Goals require adjustment due to lack of progress  [] Patient is not progressing as expected and requires additional follow up with physician  [] Other    Prognosis for POC: [x] Good [] Fair  [] Poor    Patient requires continued skilled intervention: [x] Yes  [] No      PLAN: Monitor response. Will initiate isometric shoulder flex, ext, abd, add, IR, ER for progression of HEP.   [] Continue per plan of care [] Alter current plan (see comments)  [x] Plan of care initiated [] Hold pending MD visit [] Discharge    Electronically signed by: John Samuel, PT [id number]    Note: If patient does not return for scheduled/recommended follow up visits, this note will serve as a discharge from care along with the most recent update on progress.

## 2021-06-29 ENCOUNTER — HOSPITAL ENCOUNTER (OUTPATIENT)
Dept: PHYSICAL THERAPY | Age: 56
Setting detail: THERAPIES SERIES
Discharge: HOME | End: 2021-06-29
Payer: COMMERCIAL

## 2021-06-29 PROCEDURE — 97140 MANUAL THERAPY 1/> REGIONS: CPT

## 2021-06-29 PROCEDURE — 97112 NEUROMUSCULAR REEDUCATION: CPT

## 2021-06-29 PROCEDURE — 97110 THERAPEUTIC EXERCISES: CPT

## 2021-06-29 NOTE — FLOWSHEET NOTE
[place] [facility] [place] [address] [address]  Phone: [phone]   Fax:     [phone]        Physical Therapy Treatment Note/ Progress Report:       Date:  2021    Patient Name:  Raudel Burns    :  1965  MRN: [id number]    Pertinent Medical History:     Medical/Treatment Diagnosis Information:  · Diagnosis: Left Shoulder pain, unspecified chronicity M25.512       Insurance/Certification information:  PT Insurance Information: [facility], allowed 20 visits per calendar year, does not require prior authorization  Physician Information:  Referring Practitioner: Manoj Rodríguez. James Lanier MD  Plan of care signed (Y/N): Cosign requested    Date of Patient follow up with Physician:      Progress Report: []  Yes  [x]  No     Date Range for reporting period:  Beginning:    Ending:      Progress report due (10 Rx/or 30 days whichever is less):      Recertification due (POC duration/ or 90 days whichever is less):      Visit # POC/Insurance Allowable Auth Needed   4  []Yes    [x]No       History of Injury:Patient reports a history of left shoulder pain since 2020, given an injection by , minimal improvement. Pain continued, sometimes worse, went back to Dr. Odin Brambila, referred to Dr. James Lanier. \"If I  my granddaughter (3year old) it hurts for a few days. \" Patient sees granddaughter frequently.      MRI left shoulder dated 2021 showed      1. Impingement related change in the posterosuperior humerus with overlying infraspinatus    coalescent tendinopathy. 2. Ruptured intraarticular biceps long head.         Patient saw Dr. James Lanier, injected shoulder, referred to PT to try to avoid surgery. Patient reports shoulder is feeling a little better since injection by Dr. James Lanier.   Currently pain is intermittent, movement and activity related - lifting, reaching, bra off and on, painful to reach behind back, sometimes pulling pants up, reaching into wall cabinet, moving wet laundry, pulling car door shut, putting on seat belt if uses L UE, sleeping feels better to sleep on L UE, occasional sharp pop when moving it. Patient reports L hip and knee pain, L PSIS     Relevant Medical History: HBP, cancer cervical 2018 hysterectomy, anxiety, covid-19 (January 1, 2021)      Functional Scale/Score: QuickDASH:  34 = 52.27% Disability (6/15/21)     Pain Scale:0-4 /10 at rest, 9-10/10 with activity      SUBJECTIVE:    6/23/21:  Patient reports she has been avoiding movements that hurt, has had some pain with pulling pants up, lifting 50 pound bag, etc.   6/25/21:  Patient reports arm was doing well, however she went to son's house to see new granddaughter, and while holding granddaughter, and 140 pound dog moved towards L arm 3 x pushing arm back. Patient reports shoulder was very sore the rest of the evening and this arm.  6/29/21:  Patient reports shoulder got sore when hanging on inner tube in pool or side of pool.      OBJECTIVE:    Observation: tenderness noted along long and short heads of biceps and prox 1/3 of biceps; small \"click\" or \"pop\" noted with PROM at 90 degrees of flex x 1 rep, then at 110 degrees of flex x 1 rep   Test measurements:      RESTRICTIONS/PRECAUTIONS: rupture of long head of biceps    Exercises/Interventions:   Therapeutic Ex (60447)  Min:15 Resistance/Repetitions CUES/Notes   Home Ex Program  See below   UE [place] Flexion, 1 x 10 reps standing   Gym Ball on Table Flexion, 1 x 10 reps,  Scaption, 1 x 10 reps standing   Pulleys Flexion, 1 x 10 reps sitting   NMR re-education (36227)  Min: 10     Theraslide Lat Pull down, 1 x 10 reps, yellow  Row, 1 x 10 reps, yellow  Extension, 1 x 10 reps,yellow standing        Therapeutic Activity (71413)  Min:10     Patient education  See below        Manual Intervention  ([id number])  Min:20     Soft tissue mob L upper quarter    Joint Mob Inf, post chet, Grade 2    Manual stretch All motions x 5 reps    Arm pull L UE    Modalities:  Min     Kinesiotape Rotator cuff support \"I\" and \"V\", 2.5 squares     1/2 strip x 2.5 squares \"I\" along biceps      Other Therapeutic Activities:  Pt was educated on PT POC, Diagnosis, Prognosis, pathomechanics as well as frequency and duration of scheduling future physical therapy appointments. Time was also taken on this day to answer all patient questions and participation in PT. Reviewed appointment policy in detail with patient and patient verbalized understanding. Discussed at length anatomy and biomechanics of the shoulder, muscle reeducation, motor recruitment. Home Exercise Program:  Patient instructed in lower trap sets, Codman's pendulum, wall slide with step flex, wall slide with step scaption; written instructions with pictures issued, patient able to demonstrate exercises.        Therapeutic Exercise and NMR EXR  [x] (34739) Provided verbal/tactile cueing for activities related to strengthening, flexibility, endurance, ROM  for improvements in scapular, scapulothoracic and UE control with self care, reaching, carrying, lifting, house/yardwork, driving/computer work.    [] (32154) Provided verbal/tactile cueing for activities related to improving balance, coordination, kinesthetic sense, posture, motor skill, proprioception  to assist with  scapular, scapulothoracic and UE control with self care, reaching, carrying, lifting, house/yardwork, driving/computer work. Therapeutic Activities:    [x] (89709 or 62391) Provided verbal/tactile cueing for activities related to improving balance, coordination, kinesthetic sense, posture, motor skill, proprioception and motor activation to allow for proper function of scapular, scapulothoracic and UE control with self care, carrying, lifting, driving/computer work.      Home Exercise Program:    [x] (74491) Reviewed/Progressed HEP activities related to strengthening, indicated by Functional Deficits. []? Progressing: []? Met: []? Not Met: []? Adjusted     Long Term Goals: To be achieved in at discharge:  1. Disability index score of 20% or less for the Quick DASH to assist with reaching prior level of function. []? Progressing: []? Met: []? Not Met: []? Adjusted  2. Patient will demonstrate increased AROM to WNL to allow for proper joint functioning as indicated by Functional Deficits. []? Progressing: []? Met: []? Not Met: []? Adjusted  3. Patient will demonstrate an increase in NM recruitment/activation and overall GH and scapular strength to within WNL for proper functional mobility as indicated by patients Functional Deficits. []? Progressing: []? Met: []? Not Met: []? Adjusted  4. Patient will return to ADLs/ chores/work activities without increased symptoms or restriction. []? Progressing: []? Met: []? Not Met: []? Adjusted       ASSESSMENT:  Patient presents with decreased functional mobility consistent with biceps tear and decreased control of L upper quarter            Treatment/Activity Tolerance:  [x] Patient tolerated treatment well [] Patient limited by fatique  [] Patient limited by pain  [] Patient limited by other medical complications  [] Other:     Overall Progression Towards Functional goals/ Treatment Progress Update:  [] Patient is progressing as expected towards functional goals listed. [] Progression is slowed due to complexities/Impairments listed. [] Progression has been slowed due to co-morbidities. [x] Plan just implemented, too soon to assess goals progression <30days   [] Goals require adjustment due to lack of progress  [] Patient is not progressing as expected and requires additional follow up with physician  [] Other    Prognosis for POC: [x] Good [] Fair  [] Poor    Patient requires continued skilled intervention: [x] Yes  [] No      PLAN: Monitor response.  Will initiate isometric shoulder flex, ext, abd, add, IR, ER for progression

## 2021-07-01 ENCOUNTER — HOSPITAL ENCOUNTER (OUTPATIENT)
Dept: PHYSICAL THERAPY | Age: 56
Setting detail: THERAPIES SERIES
Discharge: HOME | End: 2021-07-01
Payer: COMMERCIAL

## 2021-07-01 PROCEDURE — 97110 THERAPEUTIC EXERCISES: CPT

## 2021-07-01 PROCEDURE — 97112 NEUROMUSCULAR REEDUCATION: CPT

## 2021-07-01 PROCEDURE — 97140 MANUAL THERAPY 1/> REGIONS: CPT

## 2021-07-06 ENCOUNTER — HOSPITAL ENCOUNTER (OUTPATIENT)
Dept: PHYSICAL THERAPY | Age: 56
Setting detail: THERAPIES SERIES
Discharge: HOME | End: 2021-07-06
Payer: COMMERCIAL

## 2021-07-06 DIAGNOSIS — R10.13 DYSPEPSIA: ICD-10-CM

## 2021-07-06 PROCEDURE — 97112 NEUROMUSCULAR REEDUCATION: CPT

## 2021-07-06 PROCEDURE — 97140 MANUAL THERAPY 1/> REGIONS: CPT

## 2021-07-06 PROCEDURE — 97110 THERAPEUTIC EXERCISES: CPT

## 2021-07-06 RX ORDER — LISINOPRIL 10 MG/1
TABLET ORAL
Qty: 30 TABLET | Refills: 0 | Status: SHIPPED | OUTPATIENT
Start: 2021-07-06 | End: 2021-07-13 | Stop reason: SDUPTHER

## 2021-07-06 NOTE — FLOWSHEET NOTE
behind back, sometimes pulling pants up, reaching into wall cabinet, moving wet laundry, pulling car door shut, putting on seat belt if uses L UE, sleeping feels better to sleep on L UE, occasional sharp pop when moving it. Patient reports L hip and knee pain, L PSIS     Relevant Medical History: HBP, cancer cervical 2018 hysterectomy, anxiety, covid-19 (January 1, 2021)      Functional Scale/Score: QuickDASH:  34 = 52.27% Disability (6/15/21)     Pain Scale:0-4 /10 at rest, 9-10/10 with activity      SUBJECTIVE:    6/23/21:  Patient reports she has been avoiding movements that hurt, has had some pain with pulling pants up, lifting 50 pound bag, etc.   6/25/21:  Patient reports arm was doing well, however she went to son's house to see new granddaughter, and while holding granddaughter, and 140 pound dog moved towards L arm 3 x pushing arm back. Patient reports shoulder was very sore the rest of the evening and this arm.  6/29/21:  Patient reports shoulder got sore when hanging on inner tube in pool or side of pool. 7/1/21:  Patient reports shoulder is doing \"a little better\". 7/6/21:  Patient reports shoulder is doing better overall \"but if I move it the wrong way it's still very painful - reaching out to the side, or behind me, and I get a click a lot of the time. \"    OBJECTIVE:    Observation: tenderness noted along long and short heads of biceps and prox 1/3 of biceps; small \"click\" or \"pop\" noted with PROM at 90 degrees of flex x 1 rep, then at 110 degrees of flex x 1 rep   Test measurements:      RESTRICTIONS/PRECAUTIONS: rupture of long head of biceps    Exercises/Interventions:   Therapeutic Ex (06608)  Min:15 Resistance/Repetitions CUES/Notes   Pulleys Flexion, 1 x 15 reps See below   UE [place] Flexion, 1 x 10 reps standing   Gym Ball on Table Flexion, 1 x 10 reps,  Scaption, 1 x 10 reps standing   Biceps curl 1 x 10 reps each, 1 pound weight, with forearm in neutral, pronated, and supinated Standing, elbow at side of trunk   Home Ex Program     NMR re-education (42311)  Min: 10     Theraslide Lat Pull down, 1 x 10 reps, yellow  Row, 1 x 10 reps, yellow  Extension, 1 x 10 reps,yellow  IR, 1 x 10 reps, yellow  ER, 1 x 10 reps, yellow standing        Therapeutic Activity (83301)  Min:     Patient education  See below        Manual Intervention  ([id number])  Min:20     Soft tissue mob L upper quarter    Joint Mob Inf, post glides, Grade 2    Manual stretch All motions x 5 reps    Arm pull L UE    Modalities:  Min     Kinesiotape Rotator cuff support \"I\" and \"V\", 2.5 squares     1/2 strip x 2.5 squares \"I\" along biceps      Other Therapeutic Activities:  Pt was educated on PT POC, Diagnosis, Prognosis, pathomechanics as well as frequency and duration of scheduling future physical therapy appointments. Time was also taken on this day to answer all patient questions and participation in PT. Reviewed appointment policy in detail with patient and patient verbalized understanding. Discussed at length anatomy and biomechanics of the shoulder, muscle reeducation, motor recruitment. Home Exercise Program:  Patient instructed in lower trap sets, Codman's pendulum, wall slide with step flex, wall slide with step scaption; written instructions with pictures issued, patient able to demonstrate exercises.    7/1/21:   Patient instructed in isometric shoulder flex, ext, abd, add, IR, ER ; written instructions with pictures issued, patient able to demonstrate exercises.       Therapeutic Exercise and NMR EXR  [x] (27828) Provided verbal/tactile cueing for activities related to strengthening, flexibility, endurance, ROM  for improvements in scapular, scapulothoracic and UE control with self care, reaching, carrying, lifting, house/yardwork, driving/computer work.    [] (71298) Provided verbal/tactile cueing for activities related to improving balance, coordination, kinesthetic sense, posture, motor skill, proprioception  to assist with  scapular, scapulothoracic and UE control with self care, reaching, carrying, lifting, house/yardwork, driving/computer work. Therapeutic Activities:    [x] (79890 or 32293) Provided verbal/tactile cueing for activities related to improving balance, coordination, kinesthetic sense, posture, motor skill, proprioception and motor activation to allow for proper function of scapular, scapulothoracic and UE control with self care, carrying, lifting, driving/computer work.      Home Exercise Program:    [x] (16924) Reviewed/Progressed HEP activities related to strengthening, flexibility, endurance, ROM of scapular, scapulothoracic and UE control with self care, reaching, carrying, lifting, house/yardwork, driving/computer work  [] (50979) Reviewed/Progressed HEP activities related to improving balance, coordination, kinesthetic sense, posture, motor skill, proprioception of scapular, scapulothoracic and UE control with self care, reaching, carrying, lifting, house/yardwork, driving/computer work      Manual Treatments:  PROM / STM / Oscillations-Mobs:  G-I, II, III, IV (PA's, Inf., Post.)  [x] (37705) Provided manual therapy to mobilize soft tissue/joints of cervical/CT, scapular GHJ and UE for the purpose of modulating pain, promoting relaxation,  increasing ROM, reducing/eliminating soft tissue swelling/inflammation/restriction, improving soft tissue extensibility and allowing for proper ROM for normal function with self care, reaching, carrying, lifting, house/yardwork, driving/computer work    Charges:  Timed Code Treatment Minutes: 45   Total Treatment Minutes: 45       [] EVAL (LOW) 91747 (typically 20 minutes face-to-face)  [] EVAL (MOD) 14938 (typically 30 minutes face-to-face)  [] EVAL (HIGH) 99744 (typically 45 minutes face-to-face)  [] RE-EVAL     [x] DX(89253) x     [] Dry needle 1 or 2 Muscles (20719)  [x] NMR (46684) x     [] Dry needle 3+ Muscles (60430)  [x] Manual (35653) x    [] Ultrasound (34937)

## 2021-07-07 RX ORDER — PAROXETINE 30 MG/1
TABLET, FILM COATED ORAL
Qty: 30 TABLET | Refills: 0 | Status: SHIPPED | OUTPATIENT
Start: 2021-07-07 | End: 2021-08-04

## 2021-07-07 RX ORDER — OMEPRAZOLE 40 MG/1
CAPSULE, DELAYED RELEASE ORAL
Qty: 30 CAPSULE | Refills: 0 | Status: SHIPPED | OUTPATIENT
Start: 2021-07-07 | End: 2021-07-13 | Stop reason: SDUPTHER

## 2021-07-08 ENCOUNTER — HOSPITAL ENCOUNTER (OUTPATIENT)
Dept: PHYSICAL THERAPY | Age: 56
Setting detail: THERAPIES SERIES
Discharge: HOME | End: 2021-07-08
Payer: COMMERCIAL

## 2021-07-08 PROCEDURE — 97112 NEUROMUSCULAR REEDUCATION: CPT

## 2021-07-08 PROCEDURE — 97110 THERAPEUTIC EXERCISES: CPT

## 2021-07-08 PROCEDURE — 97140 MANUAL THERAPY 1/> REGIONS: CPT

## 2021-07-08 NOTE — FLOWSHEET NOTE
[facility] [address] [place] [address]  Phone: [phone]   Fax:     [phone]        Physical Therapy Treatment Note/ Progress Report:       Date:  2021    Patient Name:  Herson Cardoso    :  1965  MRN: [id number]    Pertinent Medical History:     Medical/Treatment Diagnosis Information:  · Diagnosis: Left Shoulder pain, unspecified chronicity M25.512       Insurance/Certification information:  PT Insurance Information: [facility], allowed 20 visits per calendar year, does not require prior authorization  Physician Information:  Referring Practitioner: Ca Wright. Amina Gómez MD  Plan of care signed (Y/N): Cosign requested    Date of Patient follow up with Physician:      Progress Report: []  Yes  [x]  No     Date Range for reporting period:  Beginnin/15/21  Ending:      Progress report due (10 Rx/or 30 days whichever is less):      Recertification due (POC duration/ or 90 days whichever is less):      Visit # POC/Insurance Allowable Auth Needed    []Yes    [x]No       History of Injury:Patient reports a history of left shoulder pain since 2020, given an injection by , minimal improvement. Pain continued, sometimes worse, went back to Dr. Odette Aguilera, referred to Dr. Amina Gómez. \"If I  my granddaughter (3year old) it hurts for a few days. \" Patient sees granddaughter frequently.      MRI left shoulder dated 2021 showed      1. Impingement related change in the posterosuperior humerus with overlying infraspinatus    coalescent tendinopathy. 2. Ruptured intraarticular biceps long head.         Patient saw Dr. Amina Gómez, injected shoulder, referred to PT to try to avoid surgery. Patient reports shoulder is feeling a little better since injection by Dr. Amina Gómez.   Currently pain is intermittent, movement and activity related - lifting, reaching, bra off and on, painful to reach behind back, sometimes pulling pants up, reaching into wall cabinet, moving wet laundry, pulling car door shut, putting on seat belt if uses L UE, sleeping feels better to sleep on L UE, occasional sharp pop when moving it. Patient reports L hip and knee pain, L PSIS     Relevant Medical History: HBP, cancer cervical 2018 hysterectomy, anxiety, covid-19 (January 1, 2021)      Functional Scale/Score: QuickDASH:  34 = 52.27% Disability (6/15/21)     Pain Scale:0-4 /10 at rest, 9-10/10 with activity      SUBJECTIVE:    6/23/21:  Patient reports she has been avoiding movements that hurt, has had some pain with pulling pants up, lifting 50 pound bag, etc.   6/25/21:  Patient reports arm was doing well, however she went to son's house to see new granddaughter, and while holding granddaughter, and 140 pound dog moved towards L arm 3 x pushing arm back. Patient reports shoulder was very sore the rest of the evening and this arm.  6/29/21:  Patient reports shoulder got sore when hanging on inner tube in pool or side of pool. 7/1/21:  Patient reports shoulder is doing \"a little better\". 7/6/21:  Patient reports shoulder is doing better overall \"but if I move it the wrong way it's still very painful - reaching out to the side, or behind me, and I get a click a lot of the time. \"  7/8/21:  Patient reports shoulder is doing a little better, but continues with the clicking and popping.     OBJECTIVE:    Observation: tenderness noted along long and short heads of biceps and prox 1/3 of biceps; small \"click\" or \"pop\" noted with PROM at 100 degrees of flex x 1 rep, then at 110 degrees of flex x 1 rep   Test measurements:      RESTRICTIONS/PRECAUTIONS: rupture of long head of biceps    Exercises/Interventions:   Therapeutic Ex (18001)  Min:15 Resistance/Repetitions CUES/Notes   Pulleys Flexion, 1 x 15 reps See below   UE [place] Flexion, 1 x 10 reps standing   Gym Ball on Table Flexion, 1 x 10 reps,  Scaption, 1 x 10 reps standing   Biceps curl 1 x 10 reps each, 1 pound weight, with forearm in neutral, pronated, and supinated Standing, elbow at side of trunk   Home Ex Program     NMR re-education (14488)  Min: 10     Theraslide Lat Pull down, 1 x 10 reps, yellow  Row, 1 x 10 reps, yellow  Extension, 1 x 10 reps,yellow  Fencing, 1 x 10 reps, yellow  IR, 1 x 10 reps, yellow  ER, 1 x 10 reps, yellow standing        Therapeutic Activity (76420)  Min:     Patient education  See below        Manual Intervention  ([id number])  Min:20     Soft tissue mob L upper quarter    Joint Mob Inf, post glides, Grade 2    Manual stretch All motions x 5 reps    Arm pull L UE    Modalities:  Min     Kinesiotape Rotator cuff support \"I\" and \"V\", 2.5 squares     1/2 strip x 2.5 squares \"I\" along biceps      Other Therapeutic Activities:  Pt was educated on PT POC, Diagnosis, Prognosis, pathomechanics as well as frequency and duration of scheduling future physical therapy appointments. Time was also taken on this day to answer all patient questions and participation in PT. Reviewed appointment policy in detail with patient and patient verbalized understanding. Discussed at length anatomy and biomechanics of the shoulder, muscle reeducation, motor recruitment. Home Exercise Program:  Patient instructed in lower trap sets, Codman's pendulum, wall slide with step flex, wall slide with step scaption; written instructions with pictures issued, patient able to demonstrate exercises. 7/1/21:   Patient instructed in isometric shoulder flex, ext, abd, add, IR, ER ; written instructions with pictures issued, patient able to demonstrate exercises.   7/8/21:   Patient instructed in theraband lat pull down, row, ext, fencing, IR, ER; yellow theraband issued ; written instructions with pictures issued, patient able to demonstrate exercises.       Therapeutic Exercise and NMR EXR  [x] (81695) Provided verbal/tactile cueing for activities related to strengthening, [] EVAL (LOW) 98332 (typically 20 minutes face-to-face)  [] EVAL (MOD) 10056 (typically 30 minutes face-to-face)  [] EVAL (HIGH) 28314 (typically 45 minutes face-to-face)  [] RE-EVAL     [x] OO(92038) x     [] Dry needle 1 or 2 Muscles (95916)  [x] NMR (38338) x     [] Dry needle 3+ Muscles (59935)  [x] Manual (48979) x    [] Ultrasound (14373) x  [] TA (65773) x     [] Mech Traction (53628)  [] ES(attended) (06039)     [] ES (un) (05863):   [] Vasopump (58367) [] Ionto (66468)   [] Other:          GOALS:  Patient stated goal: \"moving\"  []? Progressing: []? Met: []? Not Met: []? Adjusted     Therapist goals for Patient:   Short Term Goals: To be achieved in: 2-4 weeks  1. Independent in HEP and progression per patient tolerance, in order to prevent re-injury. []? Progressing: []? Met: []? Not Met: []? Adjusted  2. Patient will have a decrease in pain to facilitate improvement in movement, function, and ADLs as indicated by Functional Deficits. []? Progressing: []? Met: []? Not Met: []? Adjusted     Long Term Goals: To be achieved in at discharge:  1. Disability index score of 20% or less for the Quick DASH to assist with reaching prior level of function. []? Progressing: []? Met: []? Not Met: []? Adjusted  2. Patient will demonstrate increased AROM to WNL to allow for proper joint functioning as indicated by Functional Deficits. []? Progressing: []? Met: []? Not Met: []? Adjusted  3. Patient will demonstrate an increase in NM recruitment/activation and overall GH and scapular strength to within WNL for proper functional mobility as indicated by patients Functional Deficits. []? Progressing: []? Met: []? Not Met: []? Adjusted  4. Patient will return to ADLs/ chores/work activities without increased symptoms or restriction. []? Progressing: []? Met: []? Not Met: []?  Adjusted       ASSESSMENT:  Patient presents with decreased functional mobility consistent with biceps tear and decreased control of L upper quarter            Treatment/Activity Tolerance:  [x] Patient tolerated treatment well [] Patient limited by fatique  [] Patient limited by pain  [] Patient limited by other medical complications  [] Other:     Overall Progression Towards Functional goals/ Treatment Progress Update:  [] Patient is progressing as expected towards functional goals listed. [] Progression is slowed due to complexities/Impairments listed. [] Progression has been slowed due to co-morbidities. [x] Plan just implemented, too soon to assess goals progression <30days   [] Goals require adjustment due to lack of progress  [] Patient is not progressing as expected and requires additional follow up with physician  [] Other    Prognosis for POC: [x] Good [] Fair  [] Poor    Patient requires continued skilled intervention: [x] Yes  [] No      PLAN: Monitor response. Increase weight on bicep curls; initiate supine shoulder IR, ER for HEP if able. [x] Continue per plan of care [] Alter current plan (see comments)  [] Plan of care initiated [] Hold pending MD visit [] Discharge    Electronically signed by: Rohit Ceron, PT [id number]    Note: If patient does not return for scheduled/recommended follow up visits, this note will serve as a discharge from care along with the most recent update on progress.

## 2021-07-13 ENCOUNTER — HOSPITAL ENCOUNTER (OUTPATIENT)
Dept: PHYSICAL THERAPY | Age: 56
Setting detail: THERAPIES SERIES
Discharge: HOME | End: 2021-07-13
Payer: COMMERCIAL

## 2021-07-13 ENCOUNTER — OFFICE VISIT (OUTPATIENT)
Dept: FAMILY MEDICINE CLINIC | Age: 56
End: 2021-07-13
Payer: COMMERCIAL

## 2021-07-13 VITALS
OXYGEN SATURATION: 97 % | WEIGHT: 222 LBS | HEART RATE: 97 BPM | SYSTOLIC BLOOD PRESSURE: 128 MMHG | BODY MASS INDEX: 38.11 KG/M2 | DIASTOLIC BLOOD PRESSURE: 82 MMHG

## 2021-07-13 DIAGNOSIS — R10.13 DYSPEPSIA: ICD-10-CM

## 2021-07-13 DIAGNOSIS — F41.9 ANXIETY: ICD-10-CM

## 2021-07-13 DIAGNOSIS — I10 ESSENTIAL HYPERTENSION: Primary | ICD-10-CM

## 2021-07-13 LAB
ANION GAP SERPL CALCULATED.3IONS-SCNC: 13 MMOL/L (ref 3–16)
BUN BLDV-MCNC: 17 MG/DL (ref 7–20)
CALCIUM SERPL-MCNC: 9.3 MG/DL (ref 8.3–10.6)
CHLORIDE BLD-SCNC: 102 MMOL/L (ref 99–110)
CO2: 25 MMOL/L (ref 21–32)
CREAT SERPL-MCNC: 0.6 MG/DL (ref 0.6–1.1)
GFR AFRICAN AMERICAN: >60
GFR NON-AFRICAN AMERICAN: >60
GLUCOSE BLD-MCNC: 96 MG/DL (ref 70–99)
POTASSIUM SERPL-SCNC: 4.5 MMOL/L (ref 3.5–5.1)
SODIUM BLD-SCNC: 140 MMOL/L (ref 136–145)

## 2021-07-13 PROCEDURE — 97140 MANUAL THERAPY 1/> REGIONS: CPT

## 2021-07-13 PROCEDURE — 97112 NEUROMUSCULAR REEDUCATION: CPT

## 2021-07-13 PROCEDURE — 99214 OFFICE O/P EST MOD 30 MIN: CPT | Performed by: FAMILY MEDICINE

## 2021-07-13 PROCEDURE — 97110 THERAPEUTIC EXERCISES: CPT

## 2021-07-13 RX ORDER — OMEPRAZOLE 40 MG/1
CAPSULE, DELAYED RELEASE ORAL
Qty: 90 CAPSULE | Refills: 1 | Status: SHIPPED | OUTPATIENT
Start: 2021-07-13 | End: 2021-10-20 | Stop reason: SDUPTHER

## 2021-07-13 RX ORDER — LISINOPRIL 10 MG/1
TABLET ORAL
Qty: 90 TABLET | Refills: 1 | Status: SHIPPED | OUTPATIENT
Start: 2021-07-13 | End: 2021-10-20 | Stop reason: SDUPTHER

## 2021-07-13 SDOH — ECONOMIC STABILITY: FOOD INSECURITY: WITHIN THE PAST 12 MONTHS, YOU WORRIED THAT YOUR FOOD WOULD RUN OUT BEFORE YOU GOT MONEY TO BUY MORE.: NEVER TRUE

## 2021-07-13 SDOH — ECONOMIC STABILITY: FOOD INSECURITY: WITHIN THE PAST 12 MONTHS, THE FOOD YOU BOUGHT JUST DIDN'T LAST AND YOU DIDN'T HAVE MONEY TO GET MORE.: NEVER TRUE

## 2021-07-13 ASSESSMENT — ENCOUNTER SYMPTOMS
SINUS PRESSURE: 0
COUGH: 0
ABDOMINAL PAIN: 0
SORE THROAT: 0
VOMITING: 0
SHORTNESS OF BREATH: 0
RHINORRHEA: 0
NAUSEA: 0
DIARRHEA: 0

## 2021-07-13 ASSESSMENT — SOCIAL DETERMINANTS OF HEALTH (SDOH): HOW HARD IS IT FOR YOU TO PAY FOR THE VERY BASICS LIKE FOOD, HOUSING, MEDICAL CARE, AND HEATING?: NOT HARD AT ALL

## 2021-07-13 NOTE — PROGRESS NOTES
2021     Jillian Hills (:  1965) is a 60 y.o. female, here for evaluation of the following medical concerns:    HPI  HTN- patient's BP is much improved today,    She stated that her numbers are much improved, no side effects, has been on the medication in the past, denied headache, vision change, or dizziness.      Anxiety/depression- has been on Paxil 30 mg for the last month and believes this has helped,  having increased stress, panic, nervousness, stated that the medication has worked well, no side effects, would like to stay at the same dose. GERD- patient is stable on PPI at this time,  has stopped all NSAIDS but not improving.       Cervical cancer- following with GYN and Oncologist, cancer free for 3 years, yearly appointments only at this time.      Patient denied chest pain, sob, n, v, or diarrhea.     Review of Systems   Constitutional: Negative for activity change, fatigue, fever and unexpected weight change. HENT: Negative for congestion, ear pain, rhinorrhea, sinus pressure and sore throat. Respiratory: Negative for cough and shortness of breath. Cardiovascular: Negative for chest pain and leg swelling. Gastrointestinal: Negative for abdominal pain, diarrhea, nausea and vomiting. Endocrine: Negative for cold intolerance, heat intolerance, polydipsia and polyphagia. Musculoskeletal: Negative for arthralgias. Skin: Negative for rash. Neurological: Negative for dizziness, numbness and headaches. Psychiatric/Behavioral: Negative for dysphoric mood. The patient is not nervous/anxious. Prior to Visit Medications    Medication Sig Taking?  Authorizing Provider   lisinopril (PRINIVIL;ZESTRIL) 10 MG tablet TAKE 1 TABLET BY MOUTH EVERY DAY Yes Soy Phillips DO   omeprazole (PRILOSEC) 40 MG delayed release capsule TAKE 1 CAPSULE BY MOUTH EVERY DAY Yes Soy Phillips,    PARoxetine (PAXIL) 30 MG tablet TAKE 1 TABLET BY MOUTH EVERY DAY Yes Josse Bassett, DO rosuvastatin (CRESTOR) 20 MG tablet TAKE 1 TABLET BY MOUTH EVERY DAY Yes Soy Phillips DO   levothyroxine (SYNTHROID) 100 MCG tablet TAKE 1 TABLET BY MOUTH EVERY DAY Yes Soy Phillips DO   calcium carbonate (OSCAL) 500 MG TABS tablet Take 500 mg by mouth daily Yes Historical Provider, MD   magnesium 30 MG tablet Take 30 mg by mouth 2 times daily Yes Historical Provider, MD   BIOTIN 5000 PO Take 5,000 mg by mouth daily  Yes Historical Provider, MD   Multiple Vitamins-Minerals (CENTRUM SILVER PO) Take by mouth Yes Historical Provider, MD        Social History     Tobacco Use    Smoking status: Former Smoker     Packs/day: 0.50     Years: 10.00     Pack years: 5.00     Types: Cigarettes     Quit date:      Years since quittin.5    Smokeless tobacco: Never Used   Substance Use Topics    Alcohol use: Never     Comment: Occ        Vitals:    21 0753   BP: 128/82   Pulse: 97   SpO2: 97%   Weight: 222 lb (100.7 kg)     Estimated body mass index is 38.11 kg/m² as calculated from the following:    Height as of 21: 5' 4\" (1.626 m). Weight as of this encounter: 222 lb (100.7 kg). Physical Exam  Vitals and nursing note reviewed. Constitutional:       Appearance: She is well-developed. HENT:      Head: Normocephalic and atraumatic. Right Ear: External ear normal.      Left Ear: External ear normal.   Cardiovascular:      Rate and Rhythm: Normal rate and regular rhythm. Heart sounds: Normal heart sounds. No murmur heard. Pulmonary:      Effort: Pulmonary effort is normal. No respiratory distress. Breath sounds: Normal breath sounds. Abdominal:      General: Bowel sounds are normal.      Palpations: Abdomen is soft. Musculoskeletal:         General: Normal range of motion. Skin:     General: Skin is warm and dry. Neurological:      Mental Status: She is alert and oriented to person, place, and time.    Psychiatric:         Behavior: Behavior normal. ASSESSMENT/PLAN:  1. Dyspepsia  Stable  Continue with medication  Keep appointments with specialist.   Answered questions  - omeprazole (PRILOSEC) 40 MG delayed release capsule; TAKE 1 CAPSULE BY MOUTH EVERY DAY  Dispense: 90 capsule; Refill: 1  - Basic Metabolic Panel    2. Essential hypertension  well controlled. Discussed signs and symptoms for immediate evaluation in the ER. Reduce sodium. Monitor diet, exercise and lose weight. Keep a BP log and bring it to your next appointment. - Basic Metabolic Panel    3. Anxiety  Stable  Continue with medication  Keep appointments with specialist.   Answered questions  - Basic Metabolic Panel      Return in about 3 months (around 10/13/2021).

## 2021-07-13 NOTE — FLOWSHEET NOTE
[facility] [place] [address]. [place] [address]  Phone: [phone]   Fax:     [phone]        Physical Therapy Treatment Note/ Progress Report:       Date:  2021    Patient Name:  Rivka Wood    :  1965  MRN: [id number]    Pertinent Medical History:     Medical/Treatment Diagnosis Information:  · Diagnosis: Left Shoulder pain, unspecified chronicity M25.512       Insurance/Certification information:  PT Insurance Information: [facility], allowed 20 visits per calendar year, does not require prior authorization  Physician Information:  Referring Practitioner: Antonina Llamas MD  Plan of care signed (Y/N): Cosign requested    Date of Patient follow up with Physician:      Progress Report: []  Yes  [x]  No     Date Range for reporting period:  Beginnin/15/21  Ending:      Progress report due (10 Rx/or 30 days whichever is less):      Recertification due (POC duration/ or 90 days whichever is less):      Visit # POC/Insurance Allowable Auth Needed   8  []Yes    [x]No       History of Injury:Patient reports a history of left shoulder pain since 2020, given an injection by , minimal improvement. Pain continued, sometimes worse, went back to Dr. Alonso Kevin, referred to Dr. Rohit Llamas. \"If I  my granddaughter (3year old) it hurts for a few days. \" Patient sees granddaughter frequently.      MRI left shoulder dated 2021 showed      1. Impingement related change in the posterosuperior humerus with overlying infraspinatus    coalescent tendinopathy. 2. Ruptured intraarticular biceps long head.         Patient saw Dr. Rohit Llamas, injected shoulder, referred to PT to try to avoid surgery. Patient reports shoulder is feeling a little better since injection by Dr. Rohit Llamas.   Currently pain is intermittent, movement and activity related - lifting, reaching, bra off and on, painful to reach behind back, sometimes pulling pants up, reaching into wall cabinet, moving wet laundry, pulling car door shut, putting on seat belt if uses L UE, sleeping feels better to sleep on L UE, occasional sharp pop when moving it. Patient reports L hip and knee pain, L PSIS     Relevant Medical History: HBP, cancer cervical 2018 hysterectomy, anxiety, covid-19 (January 1, 2021)      Functional Scale/Score: QuickDASH:  34 = 52.27% Disability (6/15/21)     Pain Scale:0-4 /10 at rest, 9-10/10 with activity      SUBJECTIVE:    6/23/21:  Patient reports she has been avoiding movements that hurt, has had some pain with pulling pants up, lifting 50 pound bag, etc.   6/25/21:  Patient reports arm was doing well, however she went to son's house to see new granddaughter, and while holding granddaughter, and 140 pound dog moved towards L arm 3 x pushing arm back. Patient reports shoulder was very sore the rest of the evening and this arm.  6/29/21:  Patient reports shoulder got sore when hanging on inner tube in pool or side of pool. 7/1/21:  Patient reports shoulder is doing \"a little better\". 7/6/21:  Patient reports shoulder is doing better overall \"but if I move it the wrong way it's still very painful - reaching out to the side, or behind me, and I get a click a lot of the time. \"  7/8/21:  Patient reports shoulder is doing a little better, but continues with the clicking and popping.   7/13/21:  Patient reports doing pretty good until Sunday when she moved arm upon waking and has had an increase in popping and pain, especially with arm at 90 abd and ERing    OBJECTIVE:    Observation: tenderness noted along long and short heads of biceps and prox 1/3 of biceps; small \"click\" or \"pop\" noted with PROM at 100 degrees of flex x 1 rep, then at 110 degrees of flex x 1 rep   Test measurements:      RESTRICTIONS/PRECAUTIONS: rupture of long head of biceps    Exercises/Interventions:   Therapeutic Ex (03462)  Min:15 Resistance/Repetitions CUES/Notes   Pulleys Flexion, 1 x 15 reps See below   UE Fowler Flexion, 1 x 10 reps standing   Gym Ball on Table Flexion, 1 x 10 reps,  Scaption, 1 x 10 reps standing   Biceps curl 1 x 10 reps each, 2 pound weight, with forearm in neutral, pronated, and supinated Standing, elbow at side of trunk   Home Ex Program     NMR re-education (27235)  Min: 10     Theraslide Lat Pull down, 1 x 10 reps, yellow  Row, 1 x 10 reps, yellow  Extension, 1 x 10 reps,yellow  Fencing, 1 x 10 reps, yellow  IR, 1 x 10 reps, yellow  ER, 1 x 10 reps, yellow standing        Therapeutic Activity (64432)  Min:     Patient education  See below        Manual Intervention  ([phone])  Min:20     Soft tissue mob L upper quarter    Joint Mob Inf, post glides, Grade 2    Manual stretch All motions x 5 reps    Arm pull L UE    Modalities:  Min     Kinesiotape Rotator cuff support \"I\" and \"V\", 2.5 squares     1/2 strip x 2.5 squares \"I\" along biceps      Other Therapeutic Activities:  Pt was educated on PT POC, Diagnosis, Prognosis, pathomechanics as well as frequency and duration of scheduling future physical therapy appointments. Time was also taken on this day to answer all patient questions and participation in PT. Reviewed appointment policy in detail with patient and patient verbalized understanding. Discussed at length anatomy and biomechanics of the shoulder, muscle reeducation, motor recruitment. Home Exercise Program:  Patient instructed in lower trap sets, Codman's pendulum, wall slide with step flex, wall slide with step scaption; written instructions with pictures issued, patient able to demonstrate exercises. 7/1/21:   Patient instructed in isometric shoulder flex, ext, abd, add, IR, ER ; written instructions with pictures issued, patient able to demonstrate exercises.   7/8/21:   Patient instructed in theraband lat pull down, row, ext, fencing, IR, ER; yellow theraband issued ; written instructions with pictures issued, patient able to demonstrate exercises.       Therapeutic Exercise and NMR EXR  [x] (66349) Provided verbal/tactile cueing for activities related to strengthening, flexibility, endurance, ROM  for improvements in scapular, scapulothoracic and UE control with self care, reaching, carrying, lifting, house/yardwork, driving/computer work.    [] (64337) Provided verbal/tactile cueing for activities related to improving balance, coordination, kinesthetic sense, posture, motor skill, proprioception  to assist with  scapular, scapulothoracic and UE control with self care, reaching, carrying, lifting, house/yardwork, driving/computer work. Therapeutic Activities:    [x] (37606 or 62481) Provided verbal/tactile cueing for activities related to improving balance, coordination, kinesthetic sense, posture, motor skill, proprioception and motor activation to allow for proper function of scapular, scapulothoracic and UE control with self care, carrying, lifting, driving/computer work.      Home Exercise Program:    [x] (23567) Reviewed/Progressed HEP activities related to strengthening, flexibility, endurance, ROM of scapular, scapulothoracic and UE control with self care, reaching, carrying, lifting, house/yardwork, driving/computer work  [] (89039) Reviewed/Progressed HEP activities related to improving balance, coordination, kinesthetic sense, posture, motor skill, proprioception of scapular, scapulothoracic and UE control with self care, reaching, carrying, lifting, house/yardwork, driving/computer work      Manual Treatments:  PROM / STM / Oscillations-Mobs:  G-I, II, III, IV (PA's, Inf., Post.)  [x] (73095) Provided manual therapy to mobilize soft tissue/joints of cervical/CT, scapular GHJ and UE for the purpose of modulating pain, promoting relaxation,  increasing ROM, reducing/eliminating soft tissue swelling/inflammation/restriction, improving soft tissue extensibility and allowing for proper ROM for normal function with self care, reaching, carrying, lifting, house/yardwork, driving/computer work    Charges:  Timed Code Treatment Minutes: 45   Total Treatment Minutes: 45       [] EVAL (LOW) 58741 (typically 20 minutes face-to-face)  [] EVAL (MOD) 57817 (typically 30 minutes face-to-face)  [] EVAL (HIGH) 04920 (typically 45 minutes face-to-face)  [] RE-EVAL     [x] FA(62317) x     [] Dry needle 1 or 2 Muscles (60021)  [x] NMR (92672) x     [] Dry needle 3+ Muscles (70242)  [x] Manual (93635) x    [] Ultrasound (36304) x  [] TA (17609) x     [] Mech Traction (48255)  [] ES(attended) (65289)     [] ES (un) (67155):   [] Vasopump (01790) [] Ionto (95293)   [] Other:          GOALS:  Patient stated goal: \"moving\"  []? Progressing: []? Met: []? Not Met: []? Adjusted     Therapist goals for Patient:   Short Term Goals: To be achieved in: 2-4 weeks  1. Independent in HEP and progression per patient tolerance, in order to prevent re-injury. []? Progressing: []? Met: []? Not Met: []? Adjusted  2. Patient will have a decrease in pain to facilitate improvement in movement, function, and ADLs as indicated by Functional Deficits. []? Progressing: []? Met: []? Not Met: []? Adjusted     Long Term Goals: To be achieved in at discharge:  1. Disability index score of 20% or less for the Quick DASH to assist with reaching prior level of function. []? Progressing: []? Met: []? Not Met: []? Adjusted  2. Patient will demonstrate increased AROM to WNL to allow for proper joint functioning as indicated by Functional Deficits. []? Progressing: []? Met: []? Not Met: []? Adjusted  3. Patient will demonstrate an increase in NM recruitment/activation and overall GH and scapular strength to within WNL for proper functional mobility as indicated by patients Functional Deficits. []? Progressing: []? Met: []? Not Met: []? Adjusted  4. Patient will return to ADLs/ chores/work activities without increased symptoms or restriction.    []? Progressing: []? Met: []? Not Met: []? Adjusted       ASSESSMENT:  Patient presents with decreased functional mobility consistent with biceps tear and decreased control of L upper quarter            Treatment/Activity Tolerance:  [x] Patient tolerated treatment well [] Patient limited by fatique  [] Patient limited by pain  [] Patient limited by other medical complications  [] Other:     Overall Progression Towards Functional goals/ Treatment Progress Update:  [] Patient is progressing as expected towards functional goals listed. [] Progression is slowed due to complexities/Impairments listed. [] Progression has been slowed due to co-morbidities. [x] Plan just implemented, too soon to assess goals progression <30days   [] Goals require adjustment due to lack of progress  [] Patient is not progressing as expected and requires additional follow up with physician  [] Other    Prognosis for POC: [x] Good [] Fair  [] Poor    Patient requires continued skilled intervention: [x] Yes  [] No      PLAN: Monitor response. Increase weight on bicep curls; initiate supine shoulder IR, ER for HEP if able. [x] Continue per plan of care [] Alter current plan (see comments)  [] Plan of care initiated [] Hold pending MD visit [] Discharge    Electronically signed by: Tad Helton, PT [id number]    Note: If patient does not return for scheduled/recommended follow up visits, this note will serve as a discharge from care along with the most recent update on progress.

## 2021-07-15 ENCOUNTER — HOSPITAL ENCOUNTER (OUTPATIENT)
Dept: PHYSICAL THERAPY | Age: 56
Setting detail: THERAPIES SERIES
Discharge: HOME | End: 2021-07-15
Payer: COMMERCIAL

## 2021-07-15 NOTE — FLOWSHEET NOTE
[facility] [address]  [place] [address]  Phone: [phone]   Fax:     [phone]     Physical Therapy  Cancellation/No-show Note  Patient Name:  Essence Vernon  :  1965   Date:  7/15/2021  Cancelled visits to date: 1  No-shows to date: 0    Patient status for today's appointment patient:  [x]  Cancelled  []  Rescheduled appointment  []  No-show     Reason given by patient:  []  Patient ill  [x]  Conflicting appointment  []  No transportation    []  Conflict with work  []  No reason given  []  Other:     Comments:  Patient called, cancelled, was at dentist with grandson    Phone call information:   []  Phone call made today to patient at _ time at number provided:      []  Patient answered, conversation as follows:    []  Patient did not answer, message left as follows:  []  Phone call not made today    Electronically signed by:  Lynda White, [address] [facility]

## 2021-07-19 ENCOUNTER — HOSPITAL ENCOUNTER (OUTPATIENT)
Dept: PHYSICAL THERAPY | Age: 56
Setting detail: THERAPIES SERIES
Discharge: HOME | End: 2021-07-19
Payer: COMMERCIAL

## 2021-07-19 PROCEDURE — 97140 MANUAL THERAPY 1/> REGIONS: CPT

## 2021-07-19 PROCEDURE — 97110 THERAPEUTIC EXERCISES: CPT

## 2021-07-19 PROCEDURE — 97112 NEUROMUSCULAR REEDUCATION: CPT

## 2021-07-19 NOTE — FLOWSHEET NOTE
[facility] [address] [place] [address]  Phone: [phone]   Fax:     [phone]        Physical Therapy Treatment Note/ Progress Report:       Date:  2021    Patient Name:  Ruthann Avalos    :  1965  MRN: [id number]    Pertinent Medical History:     Medical/Treatment Diagnosis Information:  · Diagnosis: Left Shoulder pain, unspecified chronicity M25.512       Insurance/Certification information:  PT Insurance Information: [facility], allowed 20 visits per calendar year, does not require prior authorization  Physician Information:  Referring Practitioner: Reginald Morocho. Radhika Reyez MD  Plan of care signed (Y/N): Cosign requested    Date of Patient follow up with Physician:      Progress Report: []  Yes  [x]  No     Date Range for reporting period:  Beginnin/15/21  Ending:      Progress report due (10 Rx/or 30 days whichever is less):      Recertification due (POC duration/ or 90 days whichever is less):      Visit # POC/Insurance Allowable Auth Needed    []Yes    [x]No       History of Injury:Patient reports a history of left shoulder pain since 2020, given an injection by , minimal improvement. Pain continued, sometimes worse, went back to Dr. Eric Christensen, referred to Dr. Radhika Reyez. \"If I  my granddaughter (3year old) it hurts for a few days. \" Patient sees granddaughter frequently.      MRI left shoulder dated 2021 showed      1. Impingement related change in the posterosuperior humerus with overlying infraspinatus    coalescent tendinopathy. 2. Ruptured intraarticular biceps long head.         Patient saw Dr. Radhika Reyez, injected shoulder, referred to PT to try to avoid surgery. Patient reports shoulder is feeling a little better since injection by Dr. Radhika Reyez.   Currently pain is intermittent, movement and activity related - lifting, reaching, bra off and on, painful to reach behind back, sometimes pulling pants up, reaching into wall cabinet, moving wet laundry, pulling car door shut, putting on seat belt if uses L UE, sleeping feels better to sleep on L UE, occasional sharp pop when moving it. Patient reports L hip and knee pain, L PSIS     Relevant Medical History: HBP, cancer cervical 2018 hysterectomy, anxiety, covid-19 (January 1, 2021)      Functional Scale/Score: QuickDASH:  34 = 52.27% Disability (6/15/21)     Pain Scale:0-4 /10 at rest, 9-10/10 with activity      SUBJECTIVE:    6/23/21:  Patient reports she has been avoiding movements that hurt, has had some pain with pulling pants up, lifting 50 pound bag, etc.   6/25/21:  Patient reports arm was doing well, however she went to son's house to see new granddaughter, and while holding granddaughter, and 140 pound dog moved towards L arm 3 x pushing arm back. Patient reports shoulder was very sore the rest of the evening and this arm.  6/29/21:  Patient reports shoulder got sore when hanging on inner tube in pool or side of pool. 7/1/21:  Patient reports shoulder is doing \"a little better\". 7/6/21:  Patient reports shoulder is doing better overall \"but if I move it the wrong way it's still very painful - reaching out to the side, or behind me, and I get a click a lot of the time. \"  7/8/21:  Patient reports shoulder is doing a little better, but continues with the clicking and popping. 7/13/21:  Patient reports doing pretty good until Sunday when she moved arm upon waking and has had an increase in popping and pain, especially with arm at 90 abd and ERing  7/19/21:  Patient reports shoulder has been doing okay, noted a pop when leaning on elbow and lifted arm forward, with pain noted.     OBJECTIVE:    Observation: tenderness noted along long and short heads of biceps and prox 1/3 of biceps; small \"click\" or \"pop\" noted with PROM at 110 degrees of flex x 1 rep, then at 210 degrees of flex x 1 rep   Test measurements: instructed in theraband lat pull down, row, ext, fencing, IR, ER; yellow theraband issued ; written instructions with pictures issued, patient able to demonstrate exercises.       Therapeutic Exercise and NMR EXR  [x] (15990) Provided verbal/tactile cueing for activities related to strengthening, flexibility, endurance, ROM  for improvements in scapular, scapulothoracic and UE control with self care, reaching, carrying, lifting, house/yardwork, driving/computer work.    [] (48757) Provided verbal/tactile cueing for activities related to improving balance, coordination, kinesthetic sense, posture, motor skill, proprioception  to assist with  scapular, scapulothoracic and UE control with self care, reaching, carrying, lifting, house/yardwork, driving/computer work. Therapeutic Activities:    [x] (48226 or 90458) Provided verbal/tactile cueing for activities related to improving balance, coordination, kinesthetic sense, posture, motor skill, proprioception and motor activation to allow for proper function of scapular, scapulothoracic and UE control with self care, carrying, lifting, driving/computer work.      Home Exercise Program:    [x] (33464) Reviewed/Progressed HEP activities related to strengthening, flexibility, endurance, ROM of scapular, scapulothoracic and UE control with self care, reaching, carrying, lifting, house/yardwork, driving/computer work  [] (17555) Reviewed/Progressed HEP activities related to improving balance, coordination, kinesthetic sense, posture, motor skill, proprioception of scapular, scapulothoracic and UE control with self care, reaching, carrying, lifting, house/yardwork, driving/computer work      Manual Treatments:  PROM / STM / Oscillations-Mobs:  G-I, II, III, IV (PA's, Inf., Post.)  [x] (93516) Provided manual therapy to mobilize soft tissue/joints of cervical/CT, scapular GHJ and UE for the purpose of modulating pain, promoting relaxation,  increasing ROM, reducing/eliminating soft tissue swelling/inflammation/restriction, improving soft tissue extensibility and allowing for proper ROM for normal function with self care, reaching, carrying, lifting, house/yardwork, driving/computer work    Charges:  Timed Code Treatment Minutes: 45   Total Treatment Minutes: 45       [] EVAL (LOW) 25527 (typically 20 minutes face-to-face)  [] EVAL (MOD) 99211 (typically 30 minutes face-to-face)  [] EVAL (HIGH) 34344 (typically 45 minutes face-to-face)  [] RE-EVAL     [x] NP(56597) x     [] Dry needle 1 or 2 Muscles (89390)  [x] NMR (15995) x     [] Dry needle 3+ Muscles (31978)  [x] Manual (62947) x    [] Ultrasound (84345) x  [] TA (94256) x     [] Mech Traction (36475)  [] ES(attended) (76520)     [] ES (un) (80580):   [] Vasopump (85879) [] Ionto (80655)   [] Other:          GOALS:  Patient stated goal: \"moving\"  []? Progressing: []? Met: []? Not Met: []? Adjusted     Therapist goals for Patient:   Short Term Goals: To be achieved in: 2-4 weeks  1. Independent in HEP and progression per patient tolerance, in order to prevent re-injury. []? Progressing: []? Met: []? Not Met: []? Adjusted  2. Patient will have a decrease in pain to facilitate improvement in movement, function, and ADLs as indicated by Functional Deficits. []? Progressing: []? Met: []? Not Met: []? Adjusted     Long Term Goals: To be achieved in at discharge:  1. Disability index score of 20% or less for the Quick DASH to assist with reaching prior level of function. []? Progressing: []? Met: []? Not Met: []? Adjusted  2. Patient will demonstrate increased AROM to WNL to allow for proper joint functioning as indicated by Functional Deficits. []? Progressing: []? Met: []? Not Met: []? Adjusted  3. Patient will demonstrate an increase in NM recruitment/activation and overall GH and scapular strength to within WNL for proper functional mobility as indicated by patients Functional Deficits. []? Progressing: []? Met: []?  Not Met: []?

## 2021-07-22 ENCOUNTER — HOSPITAL ENCOUNTER (OUTPATIENT)
Dept: PHYSICAL THERAPY | Age: 56
Setting detail: THERAPIES SERIES
Discharge: HOME | End: 2021-07-22
Payer: COMMERCIAL

## 2021-07-22 PROCEDURE — 97110 THERAPEUTIC EXERCISES: CPT

## 2021-07-22 PROCEDURE — 97140 MANUAL THERAPY 1/> REGIONS: CPT

## 2021-07-22 PROCEDURE — 97112 NEUROMUSCULAR REEDUCATION: CPT

## 2021-07-22 NOTE — FLOWSHEET NOTE
[facility] [address] [place] [address]  Phone: [phone]   Fax:     [phone]        Physical Therapy Treatment Note/ Progress Report:       Date:  2021    Patient Name:  Wendy Toro    :  1965  MRN: [id number]    Pertinent Medical History:     Medical/Treatment Diagnosis Information:  · Diagnosis: Left Shoulder pain, unspecified chronicity M25.512       Insurance/Certification information:  PT Insurance Information: [facility], allowed 20 visits per calendar year, does not require prior authorization  Physician Information:  Referring Practitioner: Christy Rizzo. Esau Holder MD  Plan of care signed (Y/N): Cosign requested    Date of Patient follow up with Physician:      Progress Report: []  Yes  [x]  No     Date Range for reporting period:  Beginnin/15/21  Ending:      Progress report due (10 Rx/or 30 days whichever is less):      Recertification due (POC duration/ or 90 days whichever is less):      Visit # POC/Insurance Allowable Auth Needed   10 12/20 []Yes    [x]No       History of Injury:Patient reports a history of left shoulder pain since 2020, given an injection by , minimal improvement. Pain continued, sometimes worse, went back to Dr. Vero Duran, referred to Dr. Esau Holder. \"If I  my granddaughter (3year old) it hurts for a few days. \" Patient sees granddaughter frequently.      MRI left shoulder dated 2021 showed      1. Impingement related change in the posterosuperior humerus with overlying infraspinatus    coalescent tendinopathy. 2. Ruptured intraarticular biceps long head.         Patient saw Dr. Esau Holder, injected shoulder, referred to PT to try to avoid surgery. Patient reports shoulder is feeling a little better since injection by Dr. Esau Holder.   Currently pain is intermittent, movement and activity related - lifting, reaching, bra off and on, painful to reach behind back, sometimes pulling pants up, reaching into wall cabinet, moving wet laundry, pulling car door shut, putting on seat belt if uses L UE, sleeping feels better to sleep on L UE, occasional sharp pop when moving it. Patient reports L hip and knee pain, L PSIS     Relevant Medical History: HBP, cancer cervical 2018 hysterectomy, anxiety, covid-19 (January 1, 2021)      Functional Scale/Score: QuickDASH:  34 = 52.27% Disability (6/15/21)     Pain Scale:0-4 /10 at rest, 9-10/10 with activity      SUBJECTIVE:    6/23/21:  Patient reports she has been avoiding movements that hurt, has had some pain with pulling pants up, lifting 50 pound bag, etc.   6/25/21:  Patient reports arm was doing well, however she went to son's house to see new granddaughter, and while holding granddaughter, and 140 pound dog moved towards L arm 3 x pushing arm back. Patient reports shoulder was very sore the rest of the evening and this arm.  6/29/21:  Patient reports shoulder got sore when hanging on inner tube in pool or side of pool. 7/1/21:  Patient reports shoulder is doing \"a little better\". 7/6/21:  Patient reports shoulder is doing better overall \"but if I move it the wrong way it's still very painful - reaching out to the side, or behind me, and I get a click a lot of the time. \"  7/8/21:  Patient reports shoulder is doing a little better, but continues with the clicking and popping. 7/13/21:  Patient reports doing pretty good until Sunday when she moved arm upon waking and has had an increase in popping and pain, especially with arm at 90 abd and ERing  7/19/21:  Patient reports shoulder has been doing okay, noted a pop when leaning on elbow and lifted arm forward, with pain noted. 7/22/21:  Patient reports shoulder has been sore when reaching out to side, reaching behind, etc.  Patient continues to have some popping.     OBJECTIVE:    Observation: tenderness noted along long and short heads of biceps; tenderness noted throughout muscle belly of infraspinatus and insertion of infraspinatus.  Test measurements:      RESTRICTIONS/PRECAUTIONS: rupture of long head of biceps    Exercises/Interventions:   Therapeutic Ex (91118)  Min:15 Resistance/Repetitions CUES/Notes   Pulleys Flexion, 1 x 15 reps See below   UE [place] Flexion, 1 x 10 reps standing   Gym Ball on Table Flexion, 1 x 10 reps,  Scaption, 1 x 10 reps standing   Biceps curl 1 x 10 reps each, 2 pound weight, with forearm in neutral, pronated, and supinated Standing, elbow at side of trunk   Home Ex Program     NMR re-education (84721)  Min: 10     Theraslide Lat Pull down, 1 x 10 reps, green  Row, 1 x 10 reps, green  Extension, 1 x 10 reps,green  Fencing, 1 x 10 reps, green  IR, 1 x 10 reps, green  ER, 1 x 10 reps, green standing        Therapeutic Activity (49202)  Min:     Patient education  See below        Manual Intervention  ([phone])  Min:20     Soft tissue mob L upper quarter    Joint Mob Inf, post glides, Grade 2    Manual stretch All motions x 5 reps    Arm pull L UE    Modalities:  Min     Kinesiotape Rotator cuff support \"I\" and \"V\", 2.5 squares     1/2 strip x 2.5 squares \"I\" along biceps      Other Therapeutic Activities:  Pt was educated on PT POC, Diagnosis, Prognosis, pathomechanics as well as frequency and duration of scheduling future physical therapy appointments. Time was also taken on this day to answer all patient questions and participation in PT. Reviewed appointment policy in detail with patient and patient verbalized understanding. Discussed at length anatomy and biomechanics of the shoulder, muscle reeducation, motor recruitment. Home Exercise Program:  Patient instructed in lower trap sets, Codman's pendulum, wall slide with step flex, wall slide with step scaption; written instructions with pictures issued, patient able to demonstrate exercises.    7/1/21:   Patient instructed in isometric shoulder flex, ext, abd, add, IR, ER ; written instructions with pictures issued, patient able to demonstrate exercises. 7/8/21:   Patient instructed in theraband lat pull down, row, ext, fencing, IR, ER; yellow theraband issued ; written instructions with pictures issued, patient able to demonstrate exercises.       Therapeutic Exercise and NMR EXR  [x] (98461) Provided verbal/tactile cueing for activities related to strengthening, flexibility, endurance, ROM  for improvements in scapular, scapulothoracic and UE control with self care, reaching, carrying, lifting, house/yardwork, driving/computer work.    [] (07670) Provided verbal/tactile cueing for activities related to improving balance, coordination, kinesthetic sense, posture, motor skill, proprioception  to assist with  scapular, scapulothoracic and UE control with self care, reaching, carrying, lifting, house/yardwork, driving/computer work. Therapeutic Activities:    [x] (07405 or 98796) Provided verbal/tactile cueing for activities related to improving balance, coordination, kinesthetic sense, posture, motor skill, proprioception and motor activation to allow for proper function of scapular, scapulothoracic and UE control with self care, carrying, lifting, driving/computer work.      Home Exercise Program:    [x] (47594) Reviewed/Progressed HEP activities related to strengthening, flexibility, endurance, ROM of scapular, scapulothoracic and UE control with self care, reaching, carrying, lifting, house/yardwork, driving/computer work  [] (13229) Reviewed/Progressed HEP activities related to improving balance, coordination, kinesthetic sense, posture, motor skill, proprioception of scapular, scapulothoracic and UE control with self care, reaching, carrying, lifting, house/yardwork, driving/computer work      Manual Treatments:  PROM / STM / Oscillations-Mobs:  G-I, II, III, IV (PA's, Inf., Post.)  [x] (79890) Provided manual therapy to mobilize soft tissue/joints of cervical/CT, scapular GHJ and UE for the purpose of modulating pain, promoting relaxation,  increasing ROM, reducing/eliminating soft tissue swelling/inflammation/restriction, improving soft tissue extensibility and allowing for proper ROM for normal function with self care, reaching, carrying, lifting, house/yardwork, driving/computer work    Charges:  Timed Code Treatment Minutes: 45   Total Treatment Minutes: 45       [] EVAL (LOW) 22022 (typically 20 minutes face-to-face)  [] EVAL (MOD) 92742 (typically 30 minutes face-to-face)  [] EVAL (HIGH) 02186 (typically 45 minutes face-to-face)  [] RE-EVAL     [x] PG(02265) x     [] Dry needle 1 or 2 Muscles (79960)  [x] NMR (95406) x     [] Dry needle 3+ Muscles (69944)  [x] Manual (40437) x    [] Ultrasound (57269) x  [] TA (49131) x     [] Mech Traction (78725)  [] ES(attended) (94159)     [] ES (un) (95465):   [] Vasopump (73956) [] Ionto (69017)   [] Other:          GOALS:  Patient stated goal: \"moving\"  []? Progressing: []? Met: []? Not Met: []? Adjusted     Therapist goals for Patient:   Short Term Goals: To be achieved in: 2-4 weeks  1. Independent in HEP and progression per patient tolerance, in order to prevent re-injury. []? Progressing: []? Met: []? Not Met: []? Adjusted  2. Patient will have a decrease in pain to facilitate improvement in movement, function, and ADLs as indicated by Functional Deficits. []? Progressing: []? Met: []? Not Met: []? Adjusted     Long Term Goals: To be achieved in at discharge:  1. Disability index score of 20% or less for the Quick DASH to assist with reaching prior level of function. []? Progressing: []? Met: []? Not Met: []? Adjusted  2. Patient will demonstrate increased AROM to WNL to allow for proper joint functioning as indicated by Functional Deficits. []? Progressing: []? Met: []? Not Met: []? Adjusted  3.  Patient will demonstrate an increase in NM recruitment/activation and overall GH and scapular strength to within WNL for proper functional mobility as indicated by patients Functional Deficits. []? Progressing: []? Met: []? Not Met: []? Adjusted  4. Patient will return to ADLs/ chores/work activities without increased symptoms or restriction. []? Progressing: []? Met: []? Not Met: []? Adjusted       ASSESSMENT:  Patient presents with decreased functional mobility consistent with biceps tear and decreased control of L upper quarter            Treatment/Activity Tolerance:  [x] Patient tolerated treatment well [] Patient limited by fatique  [] Patient limited by pain  [] Patient limited by other medical complications  [] Other:     Overall Progression Towards Functional goals/ Treatment Progress Update:  [] Patient is progressing as expected towards functional goals listed. [] Progression is slowed due to complexities/Impairments listed. [] Progression has been slowed due to co-morbidities. [x] Plan just implemented, too soon to assess goals progression <30days   [] Goals require adjustment due to lack of progress  [] Patient is not progressing as expected and requires additional follow up with physician  [] Other    Prognosis for POC: [x] Good [] Fair  [] Poor    Patient requires continued skilled intervention: [x] Yes  [] No      PLAN: Monitor response. Increase weight on bicep curls; initiate supine shoulder IR, ER for HEP if able. Patient to schedule follow up with MD.  [x] Continue per plan of care [] Alter current plan (see comments)  [] Plan of care initiated [] Hold pending MD visit [] Discharge    Electronically signed by: Francine Rosen, PT [id number]    Note: If patient does not return for scheduled/recommended follow up visits, this note will serve as a discharge from care along with the most recent update on progress.

## 2021-07-27 ENCOUNTER — HOSPITAL ENCOUNTER (OUTPATIENT)
Dept: PHYSICAL THERAPY | Age: 56
Setting detail: THERAPIES SERIES
Discharge: HOME | End: 2021-07-27
Payer: COMMERCIAL

## 2021-07-27 PROCEDURE — 97110 THERAPEUTIC EXERCISES: CPT

## 2021-07-27 PROCEDURE — 97140 MANUAL THERAPY 1/> REGIONS: CPT

## 2021-07-27 PROCEDURE — 97112 NEUROMUSCULAR REEDUCATION: CPT

## 2021-07-27 NOTE — PLAN OF CARE
[facility] [place] [address]  Phone: [phone]   Fax:     [phone]        Physical Therapy Re-Certification Plan of Care/MD UPDATE      Dear  ,    We had the pleasure of treating the following patient for physical therapy services at [address] [place] [address]. A summary of our findings can be found in the updated assessment below. This includes our plan of care. If you have any questions or concerns regarding these findings, please do not hesitate to contact me at the office phone number checked above. Thank you for the referral.     Physician Signature:________________________________Date:__________________  By signing above (or electronic signature), therapists plan is approved by physician    Date Range Of Visits: 6/15/21 thru 21  Total Visits to Date: 12  Overall Response to Treatment:   [x]Patient is responding well to treatment and improvement is noted with regards  to goals   []Patient should continue to improve in reasonable time if they continue HEP   []Patient has plateaued and is no longer responding to skilled PT intervention    []Patient is getting worse and would benefit from return to referring MD   []Patient unable to adhere to initial POC   [x]Other:  Patient has had an increase in popping and pain as we have gained an increase in AROM. Patient may benefit from continued PT unless MD pursues other interventions. Please advise. Recommendation:    [x]Continue PT 1-3x / wk for 4 weeks.     []Hold PT, pending MD visit      Physical Therapy Treatment Note/ Progress Report:       Date:  2021    Patient Name:  Wendy Toro    :  1965  MRN: [id number]    Pertinent Medical History:     Medical/Treatment Diagnosis Information:  · Diagnosis: Left Shoulder pain, unspecified chronicity M25.512       Insurance/Certification information:  PT Insurance Information: [employer], allowed 20 visits per calendar year, does not require prior authorization  Physician Information:  Referring Practitioner: Sangeeta Dong MD  Plan of care signed (Y/N): Cosign requested    Date of Patient follow up with Physician:      Progress Report: []  Yes  [x]  No     Date Range for reporting period:  Beginnin/15/21  Ending:      Progress report due (10 Rx/or 30 days whichever is less):      Recertification due (POC duration/ or 90 days whichever is less):      Visit # POC/Insurance Allowable Auth Needed   10 12/20 []Yes    [x]No       History of Injury:Patient reports a history of left shoulder pain since 2020, given an injection by , minimal improvement. Pain continued, sometimes worse, went back to Dr. Bonilla Watt, referred to Dr. Mojgan Dong. \"If I  my granddaughter (3year old) it hurts for a few days. \" Patient sees granddaughter frequently.      MRI left shoulder dated 2021 showed      1. Impingement related change in the posterosuperior humerus with overlying infraspinatus    coalescent tendinopathy. 2. Ruptured intraarticular biceps long head.         Patient saw Dr. Mojgan Dong, injected shoulder, referred to PT to try to avoid surgery. Patient reports shoulder is feeling a little better since injection by Dr. Mojgan Dong. Currently pain is intermittent, movement and activity related - lifting, reaching, bra off and on, painful to reach behind back, sometimes pulling pants up, reaching into wall cabinet, moving wet laundry, pulling car door shut, putting on seat belt if uses L UE, sleeping feels better to sleep on L UE, occasional sharp pop when moving it.  Patient reports L hip and knee pain, L PSIS     Relevant Medical History: HBP, cancer cervical 2018 hysterectomy, anxiety, covid-19 (2021)      Functional Scale/Score: QuickDASH:  34 = 52.27% Disability (6/15/21)     Pain Scale:0-4 /10 at rest, 8/10 with activity/at worst      SUBJECTIVE: 6/23/21:  Patient reports she has been avoiding movements that hurt, has had some pain with pulling pants up, lifting 50 pound bag, etc.   6/25/21:  Patient reports arm was doing well, however she went to son's house to see new granddaughter, and while holding granddaughter, and 140 pound dog moved towards L arm 3 x pushing arm back. Patient reports shoulder was very sore the rest of the evening and this arm.  6/29/21:  Patient reports shoulder got sore when hanging on inner tube in pool or side of pool. 7/1/21:  Patient reports shoulder is doing \"a little better\". 7/6/21:  Patient reports shoulder is doing better overall \"but if I move it the wrong way it's still very painful - reaching out to the side, or behind me, and I get a click a lot of the time. \"  7/8/21:  Patient reports shoulder is doing a little better, but continues with the clicking and popping. 7/13/21:  Patient reports doing pretty good until Sunday when she moved arm upon waking and has had an increase in popping and pain, especially with arm at 90 abd and ERing  7/19/21:  Patient reports shoulder has been doing okay, noted a pop when leaning on elbow and lifted arm forward, with pain noted. 7/22/21:  Patient reports shoulder has been sore when reaching out to side, reaching behind, etc.  Patient continues to have some popping.  7/27/21:  Patient reports she had severe pain when she leaned on table to get up out of chair. Patient has continued to have popping and snapping that has been painful. OBJECTIVE:    Observation: tenderness noted along long and short heads of biceps; tenderness noted throughout muscle belly of infraspinatus and insertion of infraspinatus.    Test measurements:             6/15/21                  7/27/21  ROM (Left) Right    Shoulder Flex 45* 160 145*   Shoulder Ext 50* 60 45   Shoulder Abd 130* 165 145   Shoulder ER 55 85 75*   Shoulder IR 65 85 70                     Strength  (Left) Right    Shoulder Flex 4/5* 5/5 4+/5*   Shoulder Ex 4+/5 5/5 5/5   Shoulder Abd 4/5* 5/5 5/5   Shoulder ER 4/5* 5/5 4/5*   Shoulder IR 4+/5 5/5 4/5   Biceps 4/5* 5/5 4/5*   Triceps 4+/5 5/5 4+/5    * Pain reported    RESTRICTIONS/PRECAUTIONS: rupture of long head of biceps    Exercises/Interventions:   Therapeutic Ex (55146)  Min:15 Resistance/Repetitions CUES/Notes   Pulleys Flexion, 1 x 15 reps See below   UE [place] Flexion, 1 x 10 reps standing   Gym Ball on Table Flexion, 1 x 10 reps,  Scaption, 1 x 10 reps standing   Biceps curl 1 x 10 reps each, 2 pound weight, with forearm in neutral, pronated, and supinated Standing, elbow at side of trunk   Home Ex Program     NMR re-education (96416)  Min: 10     Theraslide Lat Pull down, 1 x 10 reps, green  Row, 1 x 10 reps, green  Extension, 1 x 10 reps,green  Fencing, 1 x 10 reps, green  IR, 1 x 10 reps, green  ER, 1 x 10 reps, green standing        Therapeutic Activity (39060)  Min:     Patient education  See below        Manual Intervention  (96585)  Min:20     Soft tissue mob L upper quarter    Joint Mob Inf, post glides, Grade 2    Manual stretch All motions x 5 reps    Arm pull L UE    Modalities:  Min     Kinesiotape Rotator cuff support \"I\" and \"V\", 2.5 squares     1/2 strip x 2.5 squares \"I\" along biceps      Other Therapeutic Activities:  Pt was educated on PT POC, Diagnosis, Prognosis, pathomechanics as well as frequency and duration of scheduling future physical therapy appointments. Time was also taken on this day to answer all patient questions and participation in PT. Reviewed appointment policy in detail with patient and patient verbalized understanding. Discussed at length anatomy and biomechanics of the shoulder, muscle reeducation, motor recruitment. Home Exercise Program:  Patient instructed in lower trap sets, Codman's pendulum, wall slide with step flex, wall slide with step scaption; written instructions with pictures issued, patient able to demonstrate exercises. 7/1/21:   Patient instructed in isometric shoulder flex, ext, abd, add, IR, ER ; written instructions with pictures issued, patient able to demonstrate exercises. 7/8/21:   Patient instructed in theraband lat pull down, row, ext, fencing, IR, ER; yellow theraband issued ; written instructions with pictures issued, patient able to demonstrate exercises.       Therapeutic Exercise and NMR EXR  [x] (30128) Provided verbal/tactile cueing for activities related to strengthening, flexibility, endurance, ROM  for improvements in scapular, scapulothoracic and UE control with self care, reaching, carrying, lifting, house/yardwork, driving/computer work.    [] (05380) Provided verbal/tactile cueing for activities related to improving balance, coordination, kinesthetic sense, posture, motor skill, proprioception  to assist with  scapular, scapulothoracic and UE control with self care, reaching, carrying, lifting, house/yardwork, driving/computer work. Therapeutic Activities:    [x] (19362 or 84523) Provided verbal/tactile cueing for activities related to improving balance, coordination, kinesthetic sense, posture, motor skill, proprioception and motor activation to allow for proper function of scapular, scapulothoracic and UE control with self care, carrying, lifting, driving/computer work.      Home Exercise Program:    [x] (22978) Reviewed/Progressed HEP activities related to strengthening, flexibility, endurance, ROM of scapular, scapulothoracic and UE control with self care, reaching, carrying, lifting, house/yardwork, driving/computer work  [] (32320) Reviewed/Progressed HEP activities related to improving balance, coordination, kinesthetic sense, posture, motor skill, proprioception of scapular, scapulothoracic and UE control with self care, reaching, carrying, lifting, house/yardwork, driving/computer work      Manual Treatments:  PROM / STM / Oscillations-Mobs:  G-I, II, III, IV (PA's, Inf., Post.)  [x] (05188) Provided manual therapy to mobilize soft tissue/joints of cervical/CT, scapular GHJ and UE for the purpose of modulating pain, promoting relaxation,  increasing ROM, reducing/eliminating soft tissue swelling/inflammation/restriction, improving soft tissue extensibility and allowing for proper ROM for normal function with self care, reaching, carrying, lifting, house/yardwork, driving/computer work    Charges:  Timed Code Treatment Minutes: 45   Total Treatment Minutes: 45       [] EVAL (LOW) 29300 (typically 20 minutes face-to-face)  [] EVAL (MOD) 35874 (typically 30 minutes face-to-face)  [] EVAL (HIGH) 96833 (typically 45 minutes face-to-face)  [] RE-EVAL     [x] QB(65930) x     [] Dry needle 1 or 2 Muscles (08615)  [x] NMR (74600) x     [] Dry needle 3+ Muscles (74415)  [x] Manual (65129) x    [] Ultrasound (89829) x  [] TA (49263) x     [] Mech Traction (94775)  [] ES(attended) (00717)     [] ES (un) (34531):   [] Vasopump (23635) [] Ionto (55507)   [] Other:          GOALS:  Patient stated goal: \"moving\"  []? Progressing: []? Met: []? Not Met: []? Adjusted     Therapist goals for Patient:   Short Term Goals: To be achieved in: 2-4 weeks  1. Independent in HEP and progression per patient tolerance, in order to prevent re-injury. []? Progressing: []? Met: []? Not Met: []? Adjusted  2. Patient will have a decrease in pain to facilitate improvement in movement, function, and ADLs as indicated by Functional Deficits. []? Progressing: []? Met: []? Not Met: []? Adjusted     Long Term Goals: To be achieved in at discharge:  1. Disability index score of 20% or less for the Quick DASH to assist with reaching prior level of function. []? Progressing: []? Met: []? Not Met: []? Adjusted  2. Patient will demonstrate increased AROM to WNL to allow for proper joint functioning as indicated by Functional Deficits. []? Progressing: []? Met: []? Not Met: []? Adjusted  3.  Patient will demonstrate an increase in NM recruitment/activation and overall GH and scapular strength to within WNL for proper functional mobility as indicated by patients Functional Deficits. []? Progressing: []? Met: []? Not Met: []? Adjusted  4. Patient will return to ADLs/ chores/work activities without increased symptoms or restriction. []? Progressing: []? Met: []? Not Met: []? Adjusted       ASSESSMENT:  Patient presents with decreased functional mobility consistent with biceps tear and decreased control of L upper quarter            Treatment/Activity Tolerance:  [x] Patient tolerated treatment well [] Patient limited by fatique  [] Patient limited by pain  [] Patient limited by other medical complications  [] Other:     Overall Progression Towards Functional goals/ Treatment Progress Update:  [] Patient is progressing as expected towards functional goals listed. [] Progression is slowed due to complexities/Impairments listed. [] Progression has been slowed due to co-morbidities. [x] Plan just implemented, too soon to assess goals progression <30days   [] Goals require adjustment due to lack of progress  [] Patient is not progressing as expected and requires additional follow up with physician  [] Other    Prognosis for POC: [x] Good [] Fair  [] Poor    Patient requires continued skilled intervention: [x] Yes  [] No      PLAN: Monitor response. Patient to see MD; will continue per MD's recommendatkln. [x] Continue per plan of care [] Alter current plan (see comments)  [] Plan of care initiated [x] Hold pending MD visit [] Discharge    Electronically signed by: Michael Logan, PT [id number]    Note: If patient does not return for scheduled/recommended follow up visits, this note will serve as a discharge from care along with the most recent update on progress.

## 2021-08-02 ENCOUNTER — OFFICE VISIT (OUTPATIENT)
Dept: ORTHOPEDIC SURGERY | Age: 56
End: 2021-08-02
Payer: COMMERCIAL

## 2021-08-02 VITALS — RESPIRATION RATE: 16 BRPM | BODY MASS INDEX: 37.9 KG/M2 | HEIGHT: 64 IN | WEIGHT: 222 LBS

## 2021-08-02 DIAGNOSIS — M75.42 SHOULDER IMPINGEMENT SYNDROME, LEFT: Primary | ICD-10-CM

## 2021-08-02 DIAGNOSIS — S46.212A RUPTURE OF LEFT BICEPS TENDON, INITIAL ENCOUNTER: ICD-10-CM

## 2021-08-02 PROCEDURE — 99213 OFFICE O/P EST LOW 20 MIN: CPT | Performed by: ORTHOPAEDIC SURGERY

## 2021-08-02 NOTE — PROGRESS NOTES
CHIEF COMPLAINT: Left shoulder pain    History:    Herson Cardoso is a 60 y.o. right handed female here for left shoulder follow up. Initial history:  referred by Navi Garg MD for Sport Medicine consultation for evaluation and treatment of Left shoulder pain. This is evaluated as a personal injury. The pain began 8 months ago. Pain is rated as a 8/10. There was not an injury. Pain is located deep in shoulder. It is worse with pushing, FRANTZ position, reaching back. The patient has not had PT. The patient has had a subacromial injection with mild relief. The patient has tried NSAIDs. The patient has not tried ice. Patient's occupation . Interval History: Shoulder still bothers her. She rates pain 8/10. She has been to PT at [facility] office. Pain is located deep within her shoulder. Is worse in FRANTZ position. PT did help with some of her tendinitis pain.         Past Medical History:   Diagnosis Date    Anxiety     Cancer ([facility] [address] 03/29/2018    Cervical    Hypertension     Hyperthyroidism        Past Surgical History:   Procedure Laterality Date    HYSTERECTOMY  05/14/2018    Robotic-assisted Total Laparoscopic Hysterectomy with Bilateral Salpingo-oophorectomy    TONSILLECTOMY      TUBAL LIGATION         Current Outpatient Medications on File Prior to Visit   Medication Sig Dispense Refill    lisinopril (PRINIVIL;ZESTRIL) 10 MG tablet TAKE 1 TABLET BY MOUTH EVERY DAY 90 tablet 1    omeprazole (PRILOSEC) 40 MG delayed release capsule TAKE 1 CAPSULE BY MOUTH EVERY DAY 90 capsule 1    PARoxetine (PAXIL) 30 MG tablet TAKE 1 TABLET BY MOUTH EVERY DAY 30 tablet 0    rosuvastatin (CRESTOR) 20 MG tablet TAKE 1 TABLET BY MOUTH EVERY DAY 90 tablet 1    levothyroxine (SYNTHROID) 100 MCG tablet TAKE 1 TABLET BY MOUTH EVERY DAY 90 tablet 1    calcium carbonate (OSCAL) 500 MG TABS tablet Take 500 mg by mouth daily      magnesium 30 MG tablet Take 30 mg by mouth 2 times daily      BIOTIN 5000 PO Take 5,000 mg by mouth daily       Multiple Vitamins-Minerals (CENTRUM SILVER PO) Take by mouth       No current facility-administered medications on file prior to visit. Allergies   Allergen Reactions    Egg Shells      Other reaction(s): GI Upset  Pt states that eating eggs (yolk) makes her sick \"all day long\" where she has to lay down. States is able to take the flu shot. Social History     Socioeconomic History    Marital status:      Spouse name: Not on file    Number of children: Not on file    Years of education: Not on file    Highest education level: Not on file   Occupational History    Not on file   Tobacco Use    Smoking status: Former Smoker     Packs/day: 0.50     Years: 10.00     Pack years: 5.00     Types: Cigarettes     Quit date:      Years since quittin.5    Smokeless tobacco: Never Used   Vaping Use    Vaping Use: Never used   Substance and Sexual Activity    Alcohol use: Never     Comment: Occ    Drug use: No    Sexual activity: Yes   Other Topics Concern    Not on file   Social History Narrative    Not on file     Social Determinants of Health     Financial Resource Strain: Low Risk     Difficulty of Paying Living Expenses: Not hard at all   Food Insecurity: No Food Insecurity    Worried About Running Out of Food in the Last Year: Never true    [address] in the Last Year: Never true   Transportation Needs:     Lack of Transportation (Medical):      Lack of Transportation (Non-Medical):    Physical Activity:     Days of Exercise per Week:     Minutes of Exercise per Session:    Stress:     Feeling of Stress :    Social Connections:     Frequency of Communication with Friends and Family:     Frequency of Social Gatherings with Friends and Family:     Attends [employer] Services:     Active Member of Clubs or Organizations:     Attends Club or Organization Meetings:     Marital Status:    Intimate Partner Violence:     Fear of Current or Ex-Partner:     Emotionally Abused:     Physically Abused:     Sexually Abused:        Family History   Problem Relation Age of Onset    Stroke Father        Review of Systems:   I have reviewed the clinically relevant past medical history, medications, allergies, family history, social history, and 13 point Review of Systems from the patient's recent history form & documented any details relevant to today's presenting complaints in the history above. The patient's self-reported past medical history, medications, allergies, family history, social history, and Review of Systems form from 11/25/20 have been scanned into the chart under the \"Media\" tab. No interval changes. Physical Examination:      Vital signs:  Resp 16   Ht 5' 4\" (1.626 m)   Wt 222 lb (100.7 kg)   LMP 03/04/2009   BMI 38.11 kg/m²      General:   alert, appears stated age, cooperative and no distress   Left Shoulder   Active ROM:   forward flexion 180, external rotation 45, internal rotation :L4. Right shoulder: forward flexion 180, external rotation 80, internal rotation L4.      Joint Tenderness:   joint line   Neer:   positive   Parkinson:   negative   Strength:   5/5 Supraspinatus, External rotation    Bilateral shoulders   Drop-arm test:   negative   Belly-press test:   negative   Bear-hug test:   negative   Speed's test:   positive   Bicipital groove tenderness:  positive   Calle's test:   positive   Cross-body adduction test:   negative    AC joint tenderness:   negative   Left shoulder ER with arm abducted 90 degrees    There are no skin lesions, cellulitis, or extreme edema in the upper extremities. Sensation is grossly intact to light touch bilaterally upper extremity. The patient has warm and well-perfused Bilateral upper extremities with brisk capillary refill.       Imaging   Left Shoulder X-Ray 6/7/21:   AC Joint: narrowing moderate  Glenohumeral joint: no abnormalities noted  Elevation humeral head: absent    Left Shoulder MRI Proscan 5/25/21:   1. Impingement related change in the posterosuperior humerus with overlying infraspinatus    coalescent tendinopathy. 2. Ruptured intraarticular biceps long head         Assessment:      Left shoulder impingement  Left shoulder long head biceps tendon rupture  Possible left shoulder SLAP tear      Plan:      We discussed nonoperative and operative treatment options. At this point, she would still like to hold off on surgery. She will give us a call if she would like to proceed with surgery. We did discuss the details for surgery including the risk, complications, and alternatives to surgery in case she wishes to proceed. Plan would be for left shoulder arthroscopy, subacromial decompression, labral debridement. She would need to see her PCP for clearance before surgery. Delta Richard. Kenan Castillo MD  Orthopaedic Surgery and Sports Medicine     Disclaimer: This note was generated with use of a verbal recognition program (DRAGON) and an attempt was made to check for errors. It is possible that there are still dictated errors within this office note. If so, please bring any significant errors to my attention for an addendum. All efforts were made to ensure that this office note is accurate.

## 2021-08-04 RX ORDER — PAROXETINE 30 MG/1
TABLET, FILM COATED ORAL
Qty: 30 TABLET | Refills: 0 | Status: SHIPPED | OUTPATIENT
Start: 2021-08-04 | End: 2021-08-31

## 2021-08-22 DIAGNOSIS — E03.9 ACQUIRED HYPOTHYROIDISM: ICD-10-CM

## 2021-08-23 RX ORDER — LEVOTHYROXINE SODIUM 0.1 MG/1
TABLET ORAL
Qty: 90 TABLET | Refills: 1 | Status: SHIPPED | OUTPATIENT
Start: 2021-08-23 | End: 2021-10-20 | Stop reason: SDUPTHER

## 2021-08-31 RX ORDER — PAROXETINE 30 MG/1
TABLET, FILM COATED ORAL
Qty: 30 TABLET | Refills: 0 | Status: SHIPPED | OUTPATIENT
Start: 2021-08-31 | End: 2021-10-04

## 2021-10-04 RX ORDER — PAROXETINE 30 MG/1
TABLET, FILM COATED ORAL
Qty: 30 TABLET | Refills: 0 | Status: SHIPPED | OUTPATIENT
Start: 2021-10-04 | End: 2021-10-20 | Stop reason: SDUPTHER

## 2021-10-20 ENCOUNTER — OFFICE VISIT (OUTPATIENT)
Dept: FAMILY MEDICINE CLINIC | Age: 56
End: 2021-10-20
Payer: COMMERCIAL

## 2021-10-20 VITALS
HEIGHT: 64 IN | SYSTOLIC BLOOD PRESSURE: 126 MMHG | DIASTOLIC BLOOD PRESSURE: 80 MMHG | BODY MASS INDEX: 37.9 KG/M2 | WEIGHT: 222 LBS

## 2021-10-20 DIAGNOSIS — Z29.9 PREVENTIVE MEASURE: ICD-10-CM

## 2021-10-20 DIAGNOSIS — Z00.00 WELL ADULT EXAM: Primary | ICD-10-CM

## 2021-10-20 DIAGNOSIS — R06.81 APNEA: ICD-10-CM

## 2021-10-20 DIAGNOSIS — E03.9 ACQUIRED HYPOTHYROIDISM: ICD-10-CM

## 2021-10-20 DIAGNOSIS — R10.13 DYSPEPSIA: ICD-10-CM

## 2021-10-20 LAB
A/G RATIO: 1.6 (ref 1.1–2.2)
ALBUMIN SERPL-MCNC: 4.4 G/DL (ref 3.4–5)
ALP BLD-CCNC: 89 U/L (ref 40–129)
ALT SERPL-CCNC: 28 U/L (ref 10–40)
ANION GAP SERPL CALCULATED.3IONS-SCNC: 14 MMOL/L (ref 3–16)
AST SERPL-CCNC: 20 U/L (ref 15–37)
BILIRUB SERPL-MCNC: 0.6 MG/DL (ref 0–1)
BILIRUBIN, POC: NORMAL
BLOOD URINE, POC: NORMAL
BUN BLDV-MCNC: 23 MG/DL (ref 7–20)
CALCIUM SERPL-MCNC: 9.7 MG/DL (ref 8.3–10.6)
CHLORIDE BLD-SCNC: 103 MMOL/L (ref 99–110)
CHOLESTEROL, TOTAL: 114 MG/DL (ref 0–199)
CLARITY, POC: CLEAR
CO2: 23 MMOL/L (ref 21–32)
COLOR, POC: YELLOW
CREAT SERPL-MCNC: 1.1 MG/DL (ref 0.6–1.1)
GFR AFRICAN AMERICAN: >60
GFR NON-AFRICAN AMERICAN: 51
GLOBULIN: 2.8 G/DL
GLUCOSE BLD-MCNC: 112 MG/DL (ref 70–99)
GLUCOSE URINE, POC: NORMAL
HCT VFR BLD CALC: 42.2 % (ref 36–48)
HDLC SERPL-MCNC: 26 MG/DL (ref 40–60)
HEMOGLOBIN: 14.6 G/DL (ref 12–16)
KETONES, POC: NORMAL
LDL CHOLESTEROL CALCULATED: 71 MG/DL
LEUKOCYTE EST, POC: NORMAL
MCH RBC QN AUTO: 32.7 PG (ref 26–34)
MCHC RBC AUTO-ENTMCNC: 34.7 G/DL (ref 31–36)
MCV RBC AUTO: 94.3 FL (ref 80–100)
NITRITE, POC: NORMAL
PDW BLD-RTO: 12.8 % (ref 12.4–15.4)
PH, POC: 7
PLATELET # BLD: 288 K/UL (ref 135–450)
PMV BLD AUTO: 8.6 FL (ref 5–10.5)
POTASSIUM SERPL-SCNC: 4.5 MMOL/L (ref 3.5–5.1)
PROTEIN, POC: NORMAL
RBC # BLD: 4.47 M/UL (ref 4–5.2)
SODIUM BLD-SCNC: 140 MMOL/L (ref 136–145)
SPECIFIC GRAVITY, POC: 1.02
TOTAL PROTEIN: 7.2 G/DL (ref 6.4–8.2)
TRIGL SERPL-MCNC: 84 MG/DL (ref 0–150)
TSH REFLEX FT4: 2.15 UIU/ML (ref 0.27–4.2)
UROBILINOGEN, POC: 0.2
VLDLC SERPL CALC-MCNC: 17 MG/DL
WBC # BLD: 6.1 K/UL (ref 4–11)

## 2021-10-20 PROCEDURE — 81002 URINALYSIS NONAUTO W/O SCOPE: CPT | Performed by: FAMILY MEDICINE

## 2021-10-20 PROCEDURE — 99396 PREV VISIT EST AGE 40-64: CPT | Performed by: FAMILY MEDICINE

## 2021-10-20 PROCEDURE — 36415 COLL VENOUS BLD VENIPUNCTURE: CPT | Performed by: FAMILY MEDICINE

## 2021-10-20 RX ORDER — OMEPRAZOLE 40 MG/1
CAPSULE, DELAYED RELEASE ORAL
Qty: 90 CAPSULE | Refills: 1 | Status: SHIPPED | OUTPATIENT
Start: 2021-10-20 | End: 2022-05-23

## 2021-10-20 RX ORDER — LEVOTHYROXINE SODIUM 0.1 MG/1
TABLET ORAL
Qty: 90 TABLET | Refills: 1 | Status: SHIPPED | OUTPATIENT
Start: 2021-10-20 | End: 2022-05-09

## 2021-10-20 RX ORDER — ROSUVASTATIN CALCIUM 20 MG/1
TABLET, COATED ORAL
Qty: 90 TABLET | Refills: 1 | Status: SHIPPED | OUTPATIENT
Start: 2021-10-20 | End: 2022-06-02

## 2021-10-20 RX ORDER — LISINOPRIL 10 MG/1
TABLET ORAL
Qty: 90 TABLET | Refills: 1 | Status: SHIPPED | OUTPATIENT
Start: 2021-10-20 | End: 2022-05-23

## 2021-10-20 RX ORDER — PAROXETINE 30 MG/1
TABLET, FILM COATED ORAL
Qty: 30 TABLET | Refills: 0 | Status: SHIPPED | OUTPATIENT
Start: 2021-10-20 | End: 2021-11-01

## 2021-10-20 ASSESSMENT — ENCOUNTER SYMPTOMS
SORE THROAT: 0
VOMITING: 0
DIARRHEA: 0
RHINORRHEA: 0
ABDOMINAL PAIN: 0
SINUS PRESSURE: 0
CHEST TIGHTNESS: 0
NAUSEA: 0
APNEA: 1
COUGH: 0
SHORTNESS OF BREATH: 0

## 2021-10-20 NOTE — PROGRESS NOTES
Psychiatric/Behavioral: Negative for dysphoric mood. The patient is not nervous/anxious. Prior to Visit Medications    Medication Sig Taking? Authorizing Provider   levothyroxine (SYNTHROID) 100 MCG tablet TAKE 1 TABLET BY MOUTH EVERY DAY Yes Soy Phillips, DO   lisinopril (PRINIVIL;ZESTRIL) 10 MG tablet TAKE 1 TABLET BY MOUTH EVERY DAY Yes Soy Phillips, DO   omeprazole (PRILOSEC) 40 MG delayed release capsule TAKE 1 CAPSULE BY MOUTH EVERY DAY Yes Soy Phillips, DO   PARoxetine (PAXIL) 30 MG tablet TAKE 1 TABLET BY MOUTH EVERY DAY Yes Soy Phillips, DO   rosuvastatin (CRESTOR) 20 MG tablet TAKE 1 TABLET BY MOUTH EVERY DAY Yes Soy Phillips, DO   calcium carbonate (OSCAL) 500 MG TABS tablet Take 500 mg by mouth daily  Historical Provider, MD   magnesium 30 MG tablet Take 30 mg by mouth 2 times daily  Historical Provider, MD   BIOTIN 5000 PO Take 5,000 mg by mouth daily   Historical Provider, MD   Multiple Vitamins-Minerals (CENTRUM SILVER PO) Take by mouth  Historical Provider, MD        Social History     Tobacco Use    Smoking status: Former Smoker     Packs/day: 0.50     Years: 10.00     Pack years: 5.00     Types: Cigarettes     Quit date:      Years since quittin.8    Smokeless tobacco: Never Used   Substance Use Topics    Alcohol use: Never     Comment: Occ        Vitals:    10/20/21 0854   BP: 126/80   Site: Right Upper Arm   Position: Sitting   Cuff Size: Medium Adult   Weight: 222 lb (100.7 kg)   Height: 5' 4\" (1.626 m)     Estimated body mass index is 38.11 kg/m² as calculated from the following:    Height as of this encounter: 5' 4\" (1.626 m). Weight as of this encounter: 222 lb (100.7 kg). Physical Exam  Constitutional:       Appearance: She is well-developed. HENT:      Head: Normocephalic and atraumatic.       Right Ear: Tympanic membrane, ear canal and external ear normal.      Left Ear: Tympanic membrane, ear canal and external ear normal.      Nose: Nose normal. Mouth/Throat:      Mouth: Mucous membranes are moist.   Eyes:      Conjunctiva/sclera: Conjunctivae normal.      Pupils: Pupils are equal, round, and reactive to light. Cardiovascular:      Rate and Rhythm: Normal rate and regular rhythm. Heart sounds: Normal heart sounds. No murmur heard. Pulmonary:      Effort: Pulmonary effort is normal.      Breath sounds: Normal breath sounds. No wheezing. Abdominal:      General: Bowel sounds are normal.      Palpations: Abdomen is soft. Tenderness: There is no abdominal tenderness. Musculoskeletal:         General: Normal range of motion. Skin:     General: Skin is warm and dry. Neurological:      Mental Status: She is alert and oriented to person, place, and time. Motor: No weakness. Psychiatric:         Behavior: Behavior normal.         Judgment: Judgment normal.         ASSESSMENT/PLAN:  1. Well adult exam  -discussed vaccinations and she agreed  -discussed HIV testing and basic labs she declined  -discuseds healthy eating habits and exercise and answered questions  -discussed age appropriate screening recommendations  - detailed conversation concerning screening  - CBC  - Comprehensive Metabolic Panel  - Lipid Panel  - TSH WITH REFLEX TO FT4  - BERTHA DIGITAL SCREEN W OR WO CAD BILATERAL; Future    2. Acquired hypothyroidism  Stable  Continue with medication  Keep appointments with specialist.   Answered questions  - levothyroxine (SYNTHROID) 100 MCG tablet; TAKE 1 TABLET BY MOUTH EVERY DAY  Dispense: 90 tablet; Refill: 1  - CBC  - Comprehensive Metabolic Panel  - Lipid Panel  - TSH WITH REFLEX TO FT4    3. Dyspepsia  Stable  Continue with medication  Keep appointments with specialist.   Answered questions  - omeprazole (PRILOSEC) 40 MG delayed release capsule; TAKE 1 CAPSULE BY MOUTH EVERY DAY  Dispense: 90 capsule; Refill: 1  - CBC  - Comprehensive Metabolic Panel  - Lipid Panel  - TSH WITH REFLEX TO FT4    4.  Apnea  Referred for procedure  Educated on the importance of having this done. Answered questions  - Ambulatory referral to Sleep Medicine    5. Preventive measure  Referred for procedure  Educated on the importance of having this done. Answered questions  - POCT Urinalysis no Micro      Return in about 6 months (around 4/20/2022).

## 2022-01-05 ENCOUNTER — OFFICE VISIT (OUTPATIENT)
Dept: ORTHOPEDIC SURGERY | Age: 57
End: 2022-01-05
Payer: COMMERCIAL

## 2022-01-05 ENCOUNTER — TELEPHONE (OUTPATIENT)
Dept: ORTHOPEDIC SURGERY | Age: 57
End: 2022-01-05

## 2022-01-05 VITALS — BODY MASS INDEX: 37.9 KG/M2 | WEIGHT: 222 LBS | RESPIRATION RATE: 18 BRPM | HEIGHT: 64 IN

## 2022-01-05 DIAGNOSIS — G89.29 CHRONIC MIDLINE LOW BACK PAIN WITH RIGHT-SIDED SCIATICA: Primary | ICD-10-CM

## 2022-01-05 DIAGNOSIS — M47.816 LUMBAR FACET ARTHROPATHY: ICD-10-CM

## 2022-01-05 DIAGNOSIS — M51.36 DDD (DEGENERATIVE DISC DISEASE), LUMBAR: ICD-10-CM

## 2022-01-05 DIAGNOSIS — M54.41 CHRONIC MIDLINE LOW BACK PAIN WITH RIGHT-SIDED SCIATICA: Primary | ICD-10-CM

## 2022-01-05 PROBLEM — M51.369 DDD (DEGENERATIVE DISC DISEASE), LUMBAR: Status: ACTIVE | Noted: 2022-01-05

## 2022-01-05 PROCEDURE — 99214 OFFICE O/P EST MOD 30 MIN: CPT | Performed by: PHYSICIAN ASSISTANT

## 2022-01-05 RX ORDER — METHYLPREDNISOLONE 4 MG/1
TABLET ORAL
Qty: 1 KIT | Refills: 0 | Status: SHIPPED | OUTPATIENT
Start: 2022-01-05 | End: 2022-01-21

## 2022-01-05 RX ORDER — TIZANIDINE 4 MG/1
4 TABLET ORAL 4 TIMES DAILY PRN
Qty: 60 TABLET | Refills: 0 | Status: SHIPPED | OUTPATIENT
Start: 2022-01-05

## 2022-01-05 NOTE — PROGRESS NOTES
History of present illness:   Ms. Angelica Tovar is a pleasant 60 y.o. female kindly referred by Cher Peng DO for consultation regarding her LBP and bilateral leg pain. She states her pain began years ago. Her pain has steadily worsend since onset. She rates her back pain 10/10 and leg pain 9/10. She describes the pain as achy, throbbing pain. The leg pain radiates down the posterior lateral and anterior lateral aspect of her right leg as well as posterior buttocks aspect of her leg to her upper left thigh. She denies numbness and tingling lower extremities. She denies weakness of her left or right leg. She denies bowel or bladder dysfunction and saddle anesthesia. She states she can sit for a maximum of 2 minutes and stand for a maximum 60 minutes. The pain does not disrupts her sleep. She has tried Aleve and Mobic without relief. Past medical history:  Her past medical history has been reviewed. Past Medical History:   Diagnosis Date    Anxiety     Cancer ([facility] [address] 03/29/2018    Cervical    Hypertension     Hyperthyroidism        Her past surgical history has been reviewed. Past Surgical History:   Procedure Laterality Date    HYSTERECTOMY  05/14/2018    Robotic-assisted Total Laparoscopic Hysterectomy with Bilateral Salpingo-oophorectomy    TONSILLECTOMY      TUBAL LIGATION           Her medications and allergies were reviewed. Current Outpatient Medications   Medication Sig Dispense Refill    methylPREDNISolone (MEDROL, ELLIE,) 4 MG tablet Take by mouth.  6 po day one 5 po day 2 4 po day 3 3 po day 4 2 po day 5 1 po day 6. 1 kit 0    tiZANidine (ZANAFLEX) 4 MG tablet Take 1 tablet by mouth 4 times daily as needed (spasm) 60 tablet 0    PARoxetine (PAXIL) 30 MG tablet TAKE 1 TABLET BY MOUTH EVERY DAY 30 tablet 2    levothyroxine (SYNTHROID) 100 MCG tablet TAKE 1 TABLET BY MOUTH EVERY DAY 90 tablet 1    lisinopril (PRINIVIL;ZESTRIL) 10 MG tablet TAKE 1 TABLET BY MOUTH EVERY DAY 90 tablet 1    omeprazole (PRILOSEC) 40 MG delayed release capsule TAKE 1 CAPSULE BY MOUTH EVERY DAY 90 capsule 1    rosuvastatin (CRESTOR) 20 MG tablet TAKE 1 TABLET BY MOUTH EVERY DAY 90 tablet 1    calcium carbonate (OSCAL) 500 MG TABS tablet Take 500 mg by mouth daily      magnesium 30 MG tablet Take 30 mg by mouth 2 times daily      BIOTIN 5000 PO Take 5,000 mg by mouth daily       Multiple Vitamins-Minerals (CENTRUM SILVER PO) Take by mouth       No current facility-administered medications for this visit. Her social history has been reviewed. Social History     Occupational History    Not on file   Tobacco Use    Smoking status: Former Smoker     Packs/day: 0.50     Years: 10.00     Pack years: 5.00     Types: Cigarettes     Quit date:      Years since quittin.0    Smokeless tobacco: Never Used   Vaping Use    Vaping Use: Never used   Substance and Sexual Activity    Alcohol use: Never     Comment: Occ    Drug use: No    Sexual activity: Yes         Her family history has been reviewed. Family History   Problem Relation Age of Onset    Stroke Father          Review of Systems:  I have reviewed the clinically relevant past medical history, medications, allergies, family history, social history, and 13 point Review of Systems from the patient's recent history form & documented any details relevant to today's presenting complaints in the history above. The patient's self-reported past medical history, medications, allergies, family history, social history, and Review of Systems form from today's date have been scanned into the chart under the \"Media\" tab. Patient's review of symptoms was reviewed and is significant for back pain and negative for recent weight loss, fatigue, chills, visual disturbances, blood in stool or urine, recent infection.      Physical examination:  Ms. Freda Rodrigues most recent vitals:  Vitals  Resp: 18  Height: 5' 4\" (162.6 cm)  Weight: 222 lb (100.7 kg)  Body mass index is 38.11 kg/m². General exam:  She is well-developed and well-nourished, is in obvious discomfort and alert and oriented to person, place, and time. She demonstrates appropriate mood and affect. Her skin is warm and dry. Her gait is normal and she walks heel to toe without significant limp or instability. Back:  She stands with slight lumbar flexion. Her lumbar flexion, extension and lateral bending are moderately reduced with pain. She has mild tenderness over her lumbar spine without obvious muscle spasm. The skin over her lumbar spine is normal without a surgical scar. Lower extremities:  She has 5/5 motor strength of bilateral lower extremities. She has a negative straight leg raise, bilaterally. Deep tendon reflexes at knees and achilles are 2+. Sensation is intact to light touch L3 to S1 bilaterally. She has no clonus. Hip range of motion is mildly diminished. Stinchfield test is negative. Abdomen:  Non-tender and non-distended. No rash. Imaging:  X rays was obtained in the office today. AP Pelvis:  Minimal degenerative changes of the left and right hip. AP and lateral lumbar spine:  Mild degenerative disc disease with mild to moderate lumbar facet arthritis. No spondylolisthesis. Diagnosis:      ICD-10-CM    1. Chronic midline low back pain with right-sided sciatica  M54.41 XR LUMBAR SPINE (2-3 VIEWS)    G89.29 XR PELVIS (1-2 VIEWS)   2. Lumbar facet arthropathy  M47.816 Ambulatory referral to Physical Therapy   3. DDD (degenerative disc disease), lumbar  M51.36 Ambulatory referral to Physical Therapy          Assessment/ Plan:  Low back pain and leg radicular pain symptoms. Remains neurologically intact. She has had a history of low back pain for years which has progressively worsened.     I had an extensive discussion with Ms. Ramandeep Marcelo and/or family regarding the natural history, etiology, and long term consequences of her condition. I have presented reasonable alternatives to the patient's proposed care, treatment, and services. Risks and benefits of the treatment options also reviewed in detail. I have outlined a treatment plan with them. She has had full opportunity to ask her questions. I have answered them all to her satisfaction. I feel that Ms. Alisha Cortes understands our discussion today     New Medications prescribed today-   Medrol Dosepak, 4 mg tablets  Tizanidine 4 mg tablets for muscle spasm. PT-PT Rx provided today for core strengthening exercise program and lumbar stabilization exercises. Procedures-no surgical indication at this time. Follow up-4-6 weeks. She was instructed to call us emergently if she begins to experience bowel or bladder dysfunction, saddle anesthesia, increasing muscle weakness, or onset/ worsening leg symptoms. Josephine Quintana PA-C   Senior Physician Assistant   [facility]                                         Disclaimer: This note was generated with use of a verbal recognition program (DRAGON) and an attempt was made to check for errors. It is possible that there are still dictated errors within this office note. If so, please bring any significant errors to my attention for an addendum. All efforts were made to ensure that this office note is accurate.

## 2022-01-06 ENCOUNTER — OFFICE VISIT (OUTPATIENT)
Dept: ORTHOPEDIC SURGERY | Age: 57
End: 2022-01-06
Payer: COMMERCIAL

## 2022-01-06 VITALS — RESPIRATION RATE: 16 BRPM | HEIGHT: 64 IN | WEIGHT: 226.4 LBS | BODY MASS INDEX: 38.65 KG/M2

## 2022-01-06 DIAGNOSIS — M25.511 ACUTE PAIN OF RIGHT SHOULDER: Primary | ICD-10-CM

## 2022-01-06 PROCEDURE — 99214 OFFICE O/P EST MOD 30 MIN: CPT | Performed by: ORTHOPAEDIC SURGERY

## 2022-01-06 NOTE — LETTER
Tests:  [ ]EKG (if not completed in last 3 months) IF: (check all that apply)   Other:  [ Angela Harrow (if not completed in last 6 months) IF: (check all that apply)   Hx Malignancy   Hx CVS/Thoracic Surg   [facility]S Disease   Hx Pneumonia last 3 months   Chronic Pulm Disease  Other:  [ ]Radiology   Knee Right Left Standing AP knee, hip to ankle on scoliosis film Other:  5. Labs  [ ]Basic Metabolic Profile  IF: (check all that apply)  [ ]Albumin    Other:     ________________________________________________________________  [ ]CBC without diff   Pre op exam      ________________________________________________________________  [ ]PT/INR  PTT   Pre op exam         ________________________________________________________________  [ ]Type & Screen   Surg with potiential for signif. blood loss  [ ]Type & cross match 2 units         ________________________________________________________________  [ ]Urine routine reflex to culture (UAR) If cultures positive, repeat on                  admission [ Jeff Shady catch urine  [ ]Nasal culture for MRSA   [ ]Nasal MRSA culture  ________________________________________________________________  6. [ ]Other:    TO: __________________________________ Date: _______ Time:_________    Physician Signature:         1/6/2022    3:12 PM         Pre-operative Physician Prophylaxis Orders    Barbra Jose Luis  1965  All orders without checkboxes will be processed automatically unless crossed out. Orders with checkboxes MUST be checked in order to be carried out. 1. Allergies: Egg shells  2. Procedure: Left shoulder arthroscopy, subacromial decompression, possible labral debridement        Ht Readings from Last 1 Encounters:   01/06/22 5' 4\" (1.626 m)             Wt Readings from Last 1 Encounters:   01/06/22 226 lb 6.4 oz (102.7 kg)   4.    [ ] DVT Prophylaxis-Contraindication (Do not give)  Allergy to heparin Currently on anticoagulation  Not indicated, low risk patient  Thrombocytopenia Admitted for bleeding diagnosis Surgery or invasive procedure  Shelly.Collar ] Sequential Compression Boots to unaffected or bilateral extremities  [ ] Venous Compression Hose (JESSICA hose) to unaffected or bilateral extremities        Knee high  [ ] Heparin 5,000 units subcutaneously 1-2 hours pre op into the thigh opposite of operative site  5.   [ ] Beta Blocker  Patient to take beta blocker the morning of surgery with a sip of water as prescribed at home  Other:  6. Shelly.Collar ] Antimicrobial Prophylaxis (Consensus Guideline Recommendations) for       [facility] procedures  All antibiotics to be initiated 30 minutes pre-op (prior to leaving pre-op hold area/ACU) EXCEPT: Vancomycin and Ciprofloxacin. Initiate Vancomycin and Ciprofloxacin 2 hours pre-op. For combination antibiotic orders, start Ciprofloxacin first, then start second antibiotic ordered. In house patients, send pre-op antibiotics with patient to pre-op hold, do not initiate.   Surgical Procedure Routine pre-op Antibiotic  Penicillin or Cephalosporin Allergy  Orthopaedic  X Cefazolin (Ancef) 2 gm IVPB x1 X Clindamycin 900 mg IVPB x1    or     If > 80 kg Cefazolin 2 gm IVPB x1 Vancomycin 1 gm IVPB x1  Approved indications for Vancomycin:  MRSA related chronic wound dialysis  Other antibiotic to be given:  TO: ________________________________Date: ____________ Time: _______  Physician Signature: Date: 1/6/2022      Time: 3:12 PM

## 2022-01-06 NOTE — LETTER
Your surgery has been scheduled with Dr. Francesca Byers. DATE OF SURGERY:      1/28/2022     ARRIVAL TIME:      7:10am     TIME OF SURGERY:      9:10am     LOCATION: [place] [address]    **PLEASE NOTE: Due to medical conditions, your surgery time is subject to change. If this is the case, you will receive a call from the hospital or clinic staff to notify you.**    Report to Surgery Registration, which is located in the main lobby of the surgery center. PLEASE DO NOT EAT OR DRINK ANYTHING AFTER MIDNIGHT THE NIGHT BEFORE YOUR SURGERY. YOU WILL ALSO NEED TO HAVE A . POST-OP APPOINTMENT: 1/31/2022 at 1:30pm at [address] [place] [address]. [address]     You will need to make an appointment with your family physician so he/she can do a pre-operative history and physical.  The history & physical cannot be completed more than 30 days prior to surgery. Please have the family physician fax the completed form to [facility]) at the number on your packet. Your pre-operative instructions and history and physical forms are included in this folder. Due to the [address] pandemic you will need to be tested prior to your surgery, unless otherwise noted. This test will need to be negative by the date of surgery. This may be required regardless of vaccination status. The hospital also requires several routine tests that will be done the day of your surgery. Because your surgery is scheduled as an outpatient procedure it will be necessary to have a responsible person with you for transportation. Please review all information given to you prior to your surgery date. If there are any questions, please do not hesitate to call our office at [phone]. Dear Patient:    As a valued customer, we would like to thank you for choosing [facility] for your upcoming surgery/procedure.     CHECKLIST FOR SURGERY:    [x]History & physical exam by your primary care physician within 30 days of your surgery date. [x]   COVID testing within 6 days of surgery. (This may be required regardless of vaccination status.) This can be done at your Primary Care office, an urgent care, [address] will need to bring the negative result with you to surgery. Failure to do so will result in cancellation of your surgery. The test will need to be completed during this time frame: 1/22-1/26/2022    [x] No aspirin products or blood thinners for one week prior to surgery. This includes NSAIDs, such as Aleve, Advil, Ibuprofen, Motrin, Naproxen, etc.    **If you are on an anti-coagulant, such as Plavix, Brilinta, Warfarin, etc., please consult your prescribing provider regarding stopping this medication. [x] Medical clearance by a cardiologist, pulmonologist, or other specialist(s) if you are under their care. [x] Nothing to eat or drink after midnight the night before surgery. [x] You will need to have a  the day of surgery. [x] Inform office of any changes in insurance, address or phone numbers. [] Provide a copy of your advanced directive/durable power of /living will on the day of your surgery/procedure. ** You will receive at least two calls from the Hospital.  One will be from Registration to pre-register you and go over your address, phone number, and insurance information. You will also hear from the Pre-Admission testing nurses. They will want to get a brief medical history and also go over your list of medications.

## 2022-01-06 NOTE — PROGRESS NOTES
CHIEF COMPLAINT: Left shoulder pain. Right shoulder joselyn. History:    Ramandeep Marcelo is a 60 y.o. right handed female here for left shoulder follow up. Initial history:  referred by Shabbir Garibay MD for Sport Medicine consultation for evaluation and treatment of Left shoulder pain. This is evaluated as a personal injury. The pain began 8 months ago. Pain is rated as a 8/10. There was not an injury. Pain is located deep in shoulder. It is worse with pushing, FRANTZ position, reaching back. The patient has not had PT. The patient has had a subacromial injection with mild relief. The patient has tried NSAIDs. The patient has not tried ice. Patient's occupation . Interval History:   She states her left shoulder is getting worse. She rates pain 7-8/2010. She would like to consider surgery for her shoulder. She now states her right shoulder started to bother her over the last 4 weeks. She wonders if this is from compensation. Pain is located anteriorly. She states she notices most of the pain when she is pushing downward. Past Medical History:   Diagnosis Date    Anxiety     Cancer ([facility] [address] 03/29/2018    Cervical    Hypertension     Hyperthyroidism        Past Surgical History:   Procedure Laterality Date    HYSTERECTOMY  05/14/2018    Robotic-assisted Total Laparoscopic Hysterectomy with Bilateral Salpingo-oophorectomy    TONSILLECTOMY      TUBAL LIGATION         Current Outpatient Medications on File Prior to Visit   Medication Sig Dispense Refill    methylPREDNISolone (MEDROL, ELLIE,) 4 MG tablet Take by mouth.  6 po day one 5 po day 2 4 po day 3 3 po day 4 2 po day 5 1 po day 6. 1 kit 0    tiZANidine (ZANAFLEX) 4 MG tablet Take 1 tablet by mouth 4 times daily as needed (spasm) 60 tablet 0    PARoxetine (PAXIL) 30 MG tablet TAKE 1 TABLET BY MOUTH EVERY DAY 30 tablet 2    levothyroxine (SYNTHROID) 100 MCG tablet TAKE 1 TABLET BY MOUTH EVERY DAY 90 tablet 1    lisinopril (PRINIVIL;ZESTRIL) 10 MG tablet TAKE 1 TABLET BY MOUTH EVERY DAY 90 tablet 1    omeprazole (PRILOSEC) 40 MG delayed release capsule TAKE 1 CAPSULE BY MOUTH EVERY DAY 90 capsule 1    rosuvastatin (CRESTOR) 20 MG tablet TAKE 1 TABLET BY MOUTH EVERY DAY 90 tablet 1    calcium carbonate (OSCAL) 500 MG TABS tablet Take 500 mg by mouth daily      magnesium 30 MG tablet Take 30 mg by mouth 2 times daily      BIOTIN 5000 PO Take 5,000 mg by mouth daily       Multiple Vitamins-Minerals (CENTRUM SILVER PO) Take by mouth       No current facility-administered medications on file prior to visit. Allergies   Allergen Reactions    Egg Shells      Other reaction(s): GI Upset  Pt states that eating eggs (yolk) makes her sick \"all day long\" where she has to lay down. States is able to take the flu shot. Social History     Socioeconomic History    Marital status:      Spouse name: Not on file    Number of children: Not on file    Years of education: Not on file    Highest education level: Not on file   Occupational History    Not on file   Tobacco Use    Smoking status: Former Smoker     Packs/day: 0.50     Years: 10.00     Pack years: 5.00     Types: Cigarettes     Quit date:      Years since quittin.0    Smokeless tobacco: Never Used   Vaping Use    Vaping Use: Never used   Substance and Sexual Activity    Alcohol use: Never     Comment: Occ    Drug use: No    Sexual activity: Yes   Other Topics Concern    Not on file   Social History Narrative    Not on file     Social Determinants of Health     Financial Resource Strain: Low Risk     Difficulty of Paying Living Expenses: Not hard at all   Food Insecurity: No Food Insecurity    Worried About Running Out of Food in the Last Year: Never true    [address] in the Last Year: Never true   Transportation Needs:     Lack of Transportation (Medical): Not on file    Lack of Transportation (Non-Medical):  Not on file Physical Activity:     Days of Exercise per Week: Not on file    Minutes of Exercise per Session: Not on file   Stress:     Feeling of Stress : Not on file   Social Connections:     Frequency of Communication with Friends and Family: Not on file    Frequency of Social Gatherings with Friends and Family: Not on file    Attends [employer] Services: Not on file    Active Member of [address] [place] [address] or Organizations: Not on file    Attends Club or Organization Meetings: Not on file    Marital Status: Not on file   Intimate Partner Violence:     Fear of Current or Ex-Partner: Not on file    Emotionally Abused: Not on file    Physically Abused: Not on file    Sexually Abused: Not on file   Housing Stability:     Unable to Pay for Housing in the Last Year: Not on file    Number of [place] in the Last Year: Not on file    Unstable Housing in the Last Year: Not on file       Family History   Problem Relation Age of Onset    Stroke Father        Review of Systems:   I have reviewed the clinically relevant past medical history, medications, allergies, family history, social history, and 13 point Review of Systems from the patient's recent history form & documented any details relevant to today's presenting complaints in the history above. The patient's self-reported past medical history, medications, allergies, family history, social history, and Review of Systems form from 11/25/20 have been scanned into the chart under the \"Media\" tab. No interval changes. Physical Examination:      Vital signs:  Resp 16   Ht 5' 4\" (1.626 m)   Wt 226 lb 6.4 oz (102.7 kg)   LMP 03/04/2009   BMI 38.86 kg/m²      General:   alert, appears stated age, cooperative and no distress   Left Shoulder   Active ROM:   forward flexion 170, external rotation 45, internal rotation :L4.      Right shoulder: forward flexion 180, external rotation 80, internal rotation L4.      Joint Tenderness:   joint line   Neer:   positive   Parkinson: negative  Right shoulder: Negative   Strength:   5/5 Supraspinatus, External rotation    Bilateral shoulders   Drop-arm test:   negative   Belly-press test:   negative   Bear-hug test:   negative   Speed's test:   positive  Right shoulder:   Bicipital groove tenderness:  positive  Right shoulder: Positive   Calle's test:   positive   Cross-body adduction test:   negative    AC joint tenderness:   negative   Left shoulder ER with arm abducted 90 degrees    There are no skin lesions, cellulitis, or extreme edema in the upper extremities. Sensation is grossly intact to light touch bilaterally upper extremity. The patient has warm and well-perfused Bilateral upper extremities with brisk capillary refill. Imaging   Left Shoulder X-Ray 6/7/21:   AC Joint: narrowing moderate  Glenohumeral joint: no abnormalities noted  Elevation humeral head: absent    Left Shoulder MRI Proscan 5/25/21:   1. Impingement related change in the posterosuperior humerus with overlying infraspinatus    coalescent tendinopathy. 2. Ruptured intraarticular biceps long head     Right shoulder x-rays 1/6/2022:3 view x-rays of the shoulder including AP, scapular Y, and axillary views obtained and reviewed. AC Joint: narrowing moderate  Glenohumeral joint: no abnormalities noted  Elevation humeral head: absent      Assessment:      Left shoulder impingement  Left shoulder long head biceps tendon rupture  Possible left shoulder SLAP tear  Right shoulder biceps tendinitis  Class II obesity  Hypertension      Plan:      Discussed for her right shoulder, I think she does have some mild long head biceps tendinitis. I had an extensive discussion with Ms. Aline Key and/or family regarding the natural history, etiology, and long term consequences of her condition. I have presented reasonable alternatives to the patient's proposed care, treatment, and services.  I have outlined a treatment plan with them and, in my opinion, surgical intervention is indicated at this time. Discussion I have had encompassed risks, benefits, and side effects related to the alternatives and the risks related to not receiving the proposed care, treatment, and services. I have discussed the potential complications (including, but not limited to injury to nerve or blood vessel, infection, bleeding, DVT or PE, stiffness, incomplete pain relief, need for further surgery, and anesthetic complications), limitations, expectations, alternatives, and risks of the surgical procedure. We also discussed the importance of postoperative rehabilitation. She has had full opportunity to ask her questions. I have answered them all to her satisfaction. I feel that Ms. Samara Armenta understands our discussion today and she will provide written informed consent for the procedure. Plan for left shoulder arthroscopy, subacromial decompression, possible labral debridement. The patient will see their PCP for H&P and clearance for surgery. Yasmin Jasso. Leopoldo Reveal, MD  Orthopaedic Surgery and Sports Medicine     Disclaimer: This note was generated with use of a verbal recognition program (DRAGON) and an attempt was made to check for errors. It is possible that there are still dictated errors within this office note. If so, please bring any significant errors to my attention for an addendum. All efforts were made to ensure that this office note is accurate.

## 2022-01-06 NOTE — LETTER
Your surgery has been scheduled with Dr. Daniel Neri. DATE OF SURGERY:      1/28/2022     ARRIVAL TIME:      8:30am     TIME OF SURGERY:      10:30am     LOCATION: [address] [place], [place] [address]Orlin garcia [place] [address]    **PLEASE NOTE: Due to medical conditions, your surgery time is subject to change. If this is the case, you will receive a call from the hospital or clinic staff to notify you.**    Report to Surgery Registration, which is located in the main lobby of the surgery center. PLEASE DO NOT EAT OR DRINK ANYTHING AFTER MIDNIGHT THE NIGHT BEFORE YOUR SURGERY. YOU WILL ALSO NEED TO HAVE A . POST-OP APPOINTMENT: 1/31/2022 at 1:30pm at [address], [address]     You will need to make an appointment with your family physician so he/she can do a pre-operative history and physical.  The history & physical cannot be completed more than 30 days prior to surgery. Please have the family physician fax the completed form to [facility]) at the number on your packet. Your pre-operative instructions and history and physical forms are included in this folder. Due to the [address] pandemic you will need to be tested prior to your surgery, unless otherwise noted. This test will need to be negative by the date of surgery. This may be required regardless of vaccination status. The hospital also requires several routine tests that will be done the day of your surgery. Because your surgery is scheduled as an outpatient procedure it will be necessary to have a responsible person with you for transportation. Please review all information given to you prior to your surgery date. If there are any questions, please do not hesitate to call our office at [phone]. Dear Patient:    As a valued customer, we would like to thank you for choosing [facility] for your upcoming surgery/procedure.     CHECKLIST FOR SURGERY:    [x]History & physical exam by your primary care physician within 30 days of your surgery date. [x]   COVID testing within 6 days of surgery. (This may be required regardless of vaccination status.) This can be done at your Primary Care office, an urgent care, [address] will need to bring the negative result with you to surgery. Failure to do so will result in cancellation of your surgery. The test will need to be completed during this time frame: 1/22-1/26/2022    [x] No aspirin products or blood thinners for one week prior to surgery. This includes NSAIDs, such as Aleve, Advil, Ibuprofen, Motrin, Naproxen, etc.    **If you are on an anti-coagulant, such as Plavix, Brilinta, Warfarin, etc., please consult your prescribing provider regarding stopping this medication. [x] Medical clearance by a cardiologist, pulmonologist, or other specialist(s) if you are under their care. [x] Nothing to eat or drink after midnight the night before surgery. [x] You will need to have a  the day of surgery. [x] Inform office of any changes in insurance, address or phone numbers. [] Provide a copy of your advanced directive/durable power of /living will on the day of your surgery/procedure. ** You will receive at least two calls from the Hospital.  One will be from Registration to pre-register you and go over your address, phone number, and insurance information. You will also hear from the Pre-Admission testing nurses. They will want to get a brief medical history and also go over your list of medications.

## 2022-01-12 ENCOUNTER — HOSPITAL ENCOUNTER (OUTPATIENT)
Dept: PHYSICAL THERAPY | Age: 57
Setting detail: THERAPIES SERIES
Discharge: HOME | End: 2022-01-12
Payer: COMMERCIAL

## 2022-01-12 PROCEDURE — 97161 PT EVAL LOW COMPLEX 20 MIN: CPT

## 2022-01-12 PROCEDURE — 97530 THERAPEUTIC ACTIVITIES: CPT

## 2022-01-12 PROCEDURE — 97112 NEUROMUSCULAR REEDUCATION: CPT

## 2022-01-12 NOTE — FLOWSHEET NOTE
[facility] Outpatient Rehabilitation and Therapy, [address] [place] [address]  Phone: [phone]   Fax:     [phone]      Physical Therapy Treatment Note/ Progress Report:     Date:  2022    Patient Name:  Nick Moreno    :  1965  MRN: [id number]    Pertinent Medical History:     Medical/Treatment Diagnosis Information:  · Diagnosis: M47.86 Lumbar facet Arthropathy, M51.36 DDD Lumbar  ·      Insurance/Certification information:  PT Insurance Information: [employer]PO, 20 visits/calendar year, prior authorization required via AIM  Physician Information:  Referring Practitioner: Monico MELGOZA  Plan of care signed (Y/N): chang requested    Date of Patient follow up with Physician:      Progress Report: []  Yes  [x]  No     Date Range for reporting period:  Beginnin22   Ending:      Progress report due (10 Rx/or 30 days whichever is less):     Recertification due (POC duration/ or 90 days whichever is less):      Visit # POC/Insurance Allowable Auth Needed    [x]Yes via AIM  []No     Latex Allergy:  [x]NO      []YES  Preferred Language for Healthcare:   [x]English       []other:      History of Injury:  Patient reports a history of lower back x 1 month or so. \"It feels like something is pinched back here, it goes down my R leg. Driving is the worst.  If I lean to the R significantly, I can ease it. Sitting at work is painful. With standing, it will go away for a minute,but it gets painful too. \"  Patient reports achiness after laying on side x 1 hour, occurs every hour thru the night. Patient has difficulty negotiating stairs and walking \"burning sensation\" noted in R hip and lower back and buttock. Last week patient reports an incidence of hypersensitivity/pain  Noted L lower back. Patient reports being stiff and sore when getting up in am, has difficulty putting on pants, shoes, socks.   Pain shoots down R LE when first gets up. Patient does get tingling in R LE, especially if laying on back. Patient has difficulty sitting at desk at work.     Relevant Medical History: cervical cancer 2017, HBP, anxiety, covid-19 (January of 2021), now vaccinated and boster  Functional Scale/Score: Ino = 29 (1/12/22)     Pain Scale:3-4 /10 with activity,  7-8/10 with activity    SUBJECTIVE:  See eval    OBJECTIVE:   Observation:    Test measurements:       RESTRICTIONS/PRECAUTIONS:     Exercises/Interventions:     Therapeutic Ex (36062)   Min: Reps/Resistance Notes/CUES   NuStep Flexion    Calf Stretch/Incline               NMR re-education (09332)  Min: Professor [address]  See below   Leg Extension     Leg Curl               Therapeutic Activity (75124)  Min:10     Patient education  See below                  Manual Intervention (68867)  Min:     Soft tissue mob     IASTM     Patellar Mob     PROM     Modalities  Min:     IFC with      CP after exercises     MH after exercises            Other Therapeutic Activities: Pt was educated on PT POC, Diagnosis, Prognosis, pathomechanics as well as frequency and duration of scheduling future physical therapy appointments. Time was also taken on this day to answer all patient questions and participation in PT. Reviewed appointment policy in detail with patient and patient verbalized understanding. Discussed at length anatomy and biomechanics of the shoulder, muscle reeducation, motor recruitment. Home Exercise Program: Patient instructed in chin tucks, lower trap sets, TA sets, glut sets, quad sets, isometric hip add ; written instructions with pictures issued, patient able to demonstrate exercises.        ASSESSMENT:  Patient presents with decreased functional mobility consistent with DDD/Lumbar facet arthropathy/piriformis syndrome   . Patient would benefit from PT to increase functional mobility. GOALS:  Patient stated goal: \"Feel better\"  []? Progressing: []? Met: []? Not Met: []? Adjusted  Therapist goals for Patient:   Short Term Goals: To be achieved in: 2-4  weeks  1. Patient reports pain </= 2/10 at rest, 4/10 with activity  []? Progressing: []? Met: []? Not Met: []? Adjusted  2. Patient able to perform ADLs (showering, dressing including shoes and socks) without increased symptoms  []? Progressing: []? Met: []? Not Met: []? Adjusted     Long Term Goals: To be achieved in: 4-6 weeks  1. Disability raw score of  15 or less for the Oswestry Disability Index to assist with reaching prior level of function. []? Progressing: []? Met: []? Not Met: []? Adjusted  2. Patient will demonstrate Good control of core musculature to provide stability with posture and proper body mechanics. []? Progressing: []? Met: []? Not Met: []? Adjusted  3. Patient will be able to return to light to moderate household chores (light cooking, dusting, dishes)without increased symptoms  []? Progressing: []? Met: []? Not Met: []? Adjusted  4. Patient will be able to sit >/= 30 minutes without increased symptoms or gait deviations  []? Progressing: []? Met: []? Not Met: []? Adjusted  5. Patient independent with written home exercise program.  []? Progressing: []? Met: []? Not Met: []? Adjusted          Treatment/Activity Tolerance:  [x] Patient tolerated treatment well [] Patient limited by fatique  [] Patient limited by pain  [] Patient limited by other medical complications  [] Other:     Overall Progression Towards Functional goals/ Treatment Progress Update:  [] Patient is progressing as expected towards functional goals listed. [] Progression is slowed due to complexities/Impairments listed. [] Progression has been slowed due to co-morbidities.   [x] Plan just implemented, too soon to assess goals progression <30days   [] Goals require adjustment due to lack of progress  [] Patient is not progressing as expected and requires additional follow up with physician  [] motor skill, proprioception of core, proximal hip and LE for self care, mobility, lifting, and ambulation/stair navigation      Manual Treatments:  PROM / STM / Oscillations-Mobs:  G-I, II, III, IV (PA's, Inf., Post.)  [] (09831) Provided manual therapy to mobilize LE, proximal hip and/or LS spine soft tissue/joints for the purpose of modulating pain, promoting relaxation,  increasing ROM, reducing/eliminating soft tissue swelling/inflammation/restriction, improving soft tissue extensibility and allowing for proper ROM for normal function with self care, mobility, lifting and ambulation. Approval Dates:  CPT Code Units Approved Units Used  Date Updated:                     Charges:  Timed Code Treatment Minutes: 20   Total Treatment Minutes: 45      [x] EVAL (LOW) 88153 (typically 20 minutes face-to-face)  [] EVAL (MOD) 80546 (typically 30 minutes face-to-face)  [] EVAL (HIGH) 43944 (typically 45 minutes face-to-face)  [] RE-EVAL     [] GQ(98496) x     [] Dry needle 1 or 2 Muscles (23746)  [x] NMR (23386) x     [] Dry needle 3+ Muscles (63276)  [] Manual (68136) x     [] Ultrasound (88581) x  [x] TA (15133) x     [] Mech Traction (70336)  [] ES(attended) (11321)     [] ES (un) (78410):   [] Vasopump (86118) [] Ionto (69693)   [] Other:      Electronically signed by: Obed Mcallister, PT [id number]    Note: If patient does not return for scheduled/recommended follow up visits, this note will serve as a discharge from care along with the most recent update on progress.

## 2022-01-12 NOTE — PLAN OF CARE
[facility]  Outpatient Rehabilitation and Therapy, [place] [address]  Phone: [phone]   Fax:     [phone]                                                       Physical Therapy Certification    Dear Referring Practitioner: Adalberto MELGOZA,    We had the pleasure of evaluating the following patient for physical therapy services at [facility]. A summary of our findings can be found in the initial assessment below. This includes our plan of care. If you have any questions or concerns regarding these findings, please do not hesitate to contact me at the office phone number checked above. Thank you for the referral.       Physician Signature:_______________________________Date:__________________  By signing above (or electronic signature), therapists plan is approved by physician                  Patient: Enio Cerrato   : 1965   MRN: [id number]  Referring Physician: Referring Practitioner: Adalberto MELGOZA      Evaluation Date: 2022      Medical Diagnosis Information:  Diagnosis: M47.86 Lumbar facet Arthropathy, M51.36 DDD Lumbar                                             Insurance information: PT Insurance Information: [employer] [place], 20 visits/calendar year, prior authorization required via AIM     Precautions/ Contra-indications:   Latex Allergy:  [x]NO      []YES  Preferred Language for Healthcare:   [x]English       []other:    C-SSRS Triggered by Intake questionnaire (Past 2 wk assessment ):   [x] No, Questionnaire did not trigger screening.   [] Yes, Patient intake triggered C-SSRS Screening      [] C-SSRS Screening completed  [] PCP notified via Epic     SUBJECTIVE: Patient reports a history of lower back x 1 month or so. \"It feels like something is pinched back here, it goes down my R leg. Driving is the worst.  If I lean to the R significantly, I can ease it. Sitting at work is painful. With standing, it will go away for a minute,but it gets painful too. \"  Patient reports achiness after laying on side x 1 hour, occurs every hour thru the night. Patient has difficulty negotiating stairs and walking \"burning sensation\" noted in R hip and lower back and buttock. Last week patient reports an incidence of hypersensitivity/pain  Noted L lower back. Patient reports being stiff and sore when getting up in am, has difficulty putting on pants, shoes, socks. Pain shoots down R LE when first gets up. Patient does get tingling in R LE, especially if laying on back. Patient has difficulty sitting at desk at work.     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    Living Status/Prior Level of Function: Independent with ADLs and IADLs,     OBJECTIVE:   Repeated Movements:    ROM  Comments   Lumbar Flex Decreased 20% \"pulling\"   Lumbar Ext Decreased 20%      ROM LEFT RIGHT Comments   Lumbar Side Bend [facility]    Lumbar Rotation [facility]    Hip Flexion [facility]    Hip Abd [facility]    Hip ER [facility]    Hip IR [facility]    Hip Extension [facility]    Knee Ext [facility]    Knee Flex [facility]    Hamstring Flex [facility] \"pulling\"   Piriformis WFL Mod tight \"pulling\"   [facility]  [place] [place]           * = Pain reported  Manual Muscle Test Left Right Comments   Hip flexion 5/5 5/5    Knee Extension 5/5 5/5    Knee Flexion 5/5 5/5    Ankle Dorsiflexion 5/5 5/5    Ankle Plantarflexion 5/5 5/5    Great Toe Dorsiflexion 5/5 5/5    Great Toe Plantarflexion 5/5 5/5            Muscle Control Left Right Comments   Transverse Abdominus Fair- Fair-    Gluteals Fair- Poor+    Quadriceps Fair Fair    Hip Adductors Fair Fair       Palpation: Tenderness noted: R piriformis, R prox ITB, R paraspinals T7-L2    Functional Mobility/Transfers: independent      Posture: post pelvic tilt, weight shifted to L in standing; tends to shift and lean to L in sitting    Gait: Patient amb with lat sway, decreased stride length    Single Limb Stance:  Left:   Fair                    Right:Fair-      Orthopedic Special Tests:   Neural dynamic tension testing Normal Abnormal Comments   Slump Test  - Degree of knee flexion:  [x]L [x]R in sitting    SLR  [x] []    SI Instability/Figure 4 [x] []          [x] Patient history, allergies, meds reviewed. Medical chart reviewed. See intake form. Review Of Systems (ROS):  [x]Performed Review of systems (Integumentary, CardioPulmonary, Neurological) by intake and observation. Intake form has been scanned into medical record. Patient has been instructed to contact their primary care physician regarding ROS issues if not already being addressed at this time.       Co-morbidities/Complexities (which will affect course of rehabilitation):   []None           Arthritic conditions   []Rheumatoid arthritis (M05.9)  []Osteoarthritis (M19.91)   Cardiovascular conditions   [x]Hypertension (I10)  []Hyperlipidemia (E78.5)  []Angina pectoris (I20)  []Atherosclerosis (I70)  []CVA Musculoskeletal conditions   []Disc pathology   []Congenital spine pathologies   []Prior surgical intervention  []Osteoporosis (M81.8)  []Osteopenia (M85.8)   Endocrine conditions   []Hypothyroid (E03.9)  []Hyperthyroid Gastrointestinal conditions   []Constipation (I38.46)   Metabolic conditions   []Morbid obesity (E66.01)  []Diabetes type 1(E10.65) or 2 (E11.65)   []Neuropathy (G60.9)     Pulmonary conditions   []Asthma (J45)  []Coughing   []COPD (J44.9)   Psychological Disorders  [x]Anxiety (F41.9)  []Depression (F32.9)   []Other: []Other:     Cervical cancer  covid-19      Barriers to/and or personal factors that will affect rehab potential:              []Age  []Sex    []Smoker              [x]Motivation                        []Co-Morbidities              []Cognitive Function, education/learning barriers              []Environmental, home barriers              []profession/work barriers  []past PT/medical experience  []other:  Justification:     Falls Risk Assessment (30 days):   [x] Falls Risk assessed and no intervention required. [] Falls Risk assessed and Patient requires intervention due to being higher risk   TUG score (>12s at risk):     [] Falls education provided, including:         ASSESSMENT: Patient presents with decreased functional mobility consistent with DDD/Lumbar facet arthropathy/piriformis syndrome   . Patient would benefit from PT to increase functional mobility.   Functional Impairments:     [x]Noted lumbar/proximal hip hypomobility   []Noted lumbosacral and/or generalized hypermobility   []Decreased Lumbosacral/hip/LE functional ROM   [x]Decreased core/proximal hip strength and neuromuscular control    []Decreased LE functional strength    []Abnormal reflexes/sensation/myotomal/dermatomal deficits  [x]Reduced balance/proprioceptive control    [x]other:  Muscle Imbalance of Core and B LEs    Functional Activity Limitations (from functional questionnaire and intake)   []Reduced ability to tolerate prolonged functional positions   []Reduced ability or difficulty with changes of positions or transfers between positions   [x]Reduced ability to maintain good posture and demonstrate good body mechanics with sitting, bending, and lifting   [x]Reduced ability to sleep   [x] Reduced ability or tolerance with driving and/or computer work   [x]Reduced ability to perform lifting, reaching, carrying tasks   []Reduced ability to squat   []Reduced ability to forward bend   [x]Reduced ability to ambulate prolonged functional [] a total of 3 or more elements   [] a total of 4 or more elements   [x] A clinical presentation with:  [] stable and/or uncomplicated characteristics   [] evolving clinical presentation with changing characteristics  [] unstable and unpredictable characteristics;   [x] Clinical decision making of [] low, [] moderate, [] high complexity using standardized patient assessment instrument and/or measurable assessment of functional outcome. [x] EVAL (LOW) 75472 (typically 20 minutes face-to-face)  [] EVAL (MOD) 31126 (typically 30 minutes face-to-face)  [] EVAL (HIGH) 06180 (typically 45 minutes face-to-face)  [] RE-EVAL     PLAN: Initiate NuStep, calf stretch/incline board, soft tissue mob, modalities prn. Instruct patient in piriformis stretch or  bent leg fall out and/or clamshell with pillow for HEP. Possible kinesiotape to piriformis    Frequency/Duration:  1-3 days per week for 4-6 Weeks:  Interventions:  [x]  Therapeutic exercise including: strength training, ROM, for LE, Glutes and core   [x]  NMR activation and proprioception for glutes , LE and Core   [x]  Manual therapy as indicated for Hip complex, LE and spine to include: Dry Needling/IASTM, STM, PROM, Gr I-IV mobilizations, manipulation. [x]  Modalities as needed that may include: thermal agents, E-stim, Biofeedback, US, iontophoresis as indicated  [x]  Patient education on joint protection, postural re-education, activity modification, progression of HEP. HEP instruction:  Patient instructed in chin tucks, lower trap sets, TA sets, glut sets, quad sets, isometric hip add ; written instructions with pictures issued, patient able to demonstrate exercises. GOALS:  Patient stated goal: \"Feel better\"  [] Progressing: [] Met: [] Not Met: [] Adjusted  Therapist goals for Patient:   Short Term Goals: To be achieved in: 2-4  weeks  1. Patient reports pain </= 2/10 at rest, 4/10 with activity  [] Progressing: [] Met: [] Not Met: [] Adjusted  2. Patient able to perform ADLs (showering, dressing including shoes and socks) without increased symptoms  [] Progressing: [] Met: [] Not Met: [] Adjusted    Long Term Goals: To be achieved in: 4-6 weeks  1. Disability raw score of  15 or less for the Oswestry Disability Index to assist with reaching prior level of function. [] Progressing: [] Met: [] Not Met: [] Adjusted  2. Patient will demonstrate Good control of core musculature to provide stability with posture and proper body mechanics. [] Progressing: [] Met: [] Not Met: [] Adjusted  3. Patient will be able to return to light to moderate household chores (light cooking, dusting, dishes)without increased symptoms  [] Progressing: [] Met: [] Not Met: [] Adjusted  4. Patient will be able to sit >/= 30 minutes without increased symptoms or gait deviations  [] Progressing: [] Met: [] Not Met: [] Adjusted  5. Patient independent with written home exercise program.  [] Progressing: [] Met: [] Not Met: [] Adjusted     Electronically signed by:  Osorio Mooney, PT [id number]        Note: If patient does not return for scheduled/recommended follow up visits, this note will serve as a discharge from care along with the most recent update on progress.

## 2022-01-13 ENCOUNTER — TELEPHONE (OUTPATIENT)
Dept: ORTHOPEDIC SURGERY | Age: 57
End: 2022-01-13

## 2022-01-17 ENCOUNTER — TELEPHONE (OUTPATIENT)
Dept: ORTHOPEDIC SURGERY | Age: 57
End: 2022-01-17

## 2022-01-17 ENCOUNTER — APPOINTMENT (OUTPATIENT)
Dept: PHYSICAL THERAPY | Age: 57
End: 2022-01-17
Payer: COMMERCIAL

## 2022-01-17 DIAGNOSIS — M75.42 SHOULDER IMPINGEMENT SYNDROME, LEFT: Primary | ICD-10-CM

## 2022-01-17 NOTE — TELEPHONE ENCOUNTER
Auth: # [id number]    Date: 01/28/22 thru 03/28/22  Type of SX:  Outpatient  Location: [facility]  CPT: 51763   DX Code: M75.42  SX area:  shoulder  Insurance: [id number]    DENIED  CPT: 35111  REASON: Not medically necessary  Peer to peer: [phone]  Reference # [id number]

## 2022-01-19 ENCOUNTER — APPOINTMENT (OUTPATIENT)
Dept: PHYSICAL THERAPY | Age: 57
End: 2022-01-19
Payer: COMMERCIAL

## 2022-01-21 ENCOUNTER — OFFICE VISIT (OUTPATIENT)
Dept: FAMILY MEDICINE CLINIC | Age: 57
End: 2022-01-21
Payer: COMMERCIAL

## 2022-01-21 VITALS
HEART RATE: 71 BPM | OXYGEN SATURATION: 97 % | WEIGHT: 226.2 LBS | DIASTOLIC BLOOD PRESSURE: 68 MMHG | BODY MASS INDEX: 38.83 KG/M2 | SYSTOLIC BLOOD PRESSURE: 120 MMHG

## 2022-01-21 DIAGNOSIS — Z01.818 PRE-OP EXAM: Primary | ICD-10-CM

## 2022-01-21 LAB
A/G RATIO: 2.1 (ref 1.1–2.2)
ALBUMIN SERPL-MCNC: 4.5 G/DL (ref 3.4–5)
ALP BLD-CCNC: 64 U/L (ref 40–129)
ALT SERPL-CCNC: 16 U/L (ref 10–40)
ANION GAP SERPL CALCULATED.3IONS-SCNC: 14 MMOL/L (ref 3–16)
AST SERPL-CCNC: 13 U/L (ref 15–37)
BILIRUB SERPL-MCNC: 0.4 MG/DL (ref 0–1)
BUN BLDV-MCNC: 17 MG/DL (ref 7–20)
CALCIUM SERPL-MCNC: 9.3 MG/DL (ref 8.3–10.6)
CHLORIDE BLD-SCNC: 103 MMOL/L (ref 99–110)
CO2: 24 MMOL/L (ref 21–32)
CREAT SERPL-MCNC: 0.7 MG/DL (ref 0.6–1.1)
GFR AFRICAN AMERICAN: >60
GFR NON-AFRICAN AMERICAN: >60
GLUCOSE BLD-MCNC: 100 MG/DL (ref 70–99)
HCT VFR BLD CALC: 41.1 % (ref 36–48)
HEMOGLOBIN: 14 G/DL (ref 12–16)
MCH RBC QN AUTO: 31.6 PG (ref 26–34)
MCHC RBC AUTO-ENTMCNC: 34 G/DL (ref 31–36)
MCV RBC AUTO: 93 FL (ref 80–100)
PDW BLD-RTO: 13 % (ref 12.4–15.4)
PLATELET # BLD: 247 K/UL (ref 135–450)
PMV BLD AUTO: 8.3 FL (ref 5–10.5)
POTASSIUM SERPL-SCNC: 4.4 MMOL/L (ref 3.5–5.1)
RBC # BLD: 4.42 M/UL (ref 4–5.2)
SODIUM BLD-SCNC: 141 MMOL/L (ref 136–145)
TOTAL PROTEIN: 6.6 G/DL (ref 6.4–8.2)
WBC # BLD: 5.8 K/UL (ref 4–11)

## 2022-01-21 PROCEDURE — 93000 ELECTROCARDIOGRAM COMPLETE: CPT | Performed by: FAMILY MEDICINE

## 2022-01-21 PROCEDURE — 99214 OFFICE O/P EST MOD 30 MIN: CPT | Performed by: FAMILY MEDICINE

## 2022-01-21 ASSESSMENT — ENCOUNTER SYMPTOMS
CHEST TIGHTNESS: 0
RHINORRHEA: 0
SINUS PRESSURE: 0
TROUBLE SWALLOWING: 0
SORE THROAT: 0
FACIAL SWELLING: 0
SHORTNESS OF BREATH: 0
DIARRHEA: 0
WHEEZING: 0
COUGH: 0
NAUSEA: 0
VOMITING: 0
ABDOMINAL PAIN: 0

## 2022-01-21 ASSESSMENT — PATIENT HEALTH QUESTIONNAIRE - PHQ9
3. TROUBLE FALLING OR STAYING ASLEEP: 0
SUM OF ALL RESPONSES TO PHQ QUESTIONS 1-9: 0
SUM OF ALL RESPONSES TO PHQ9 QUESTIONS 1 & 2: 0
2. FEELING DOWN, DEPRESSED OR HOPELESS: 0
6. FEELING BAD ABOUT YOURSELF - OR THAT YOU ARE A FAILURE OR HAVE LET YOURSELF OR YOUR FAMILY DOWN: 0
4. FEELING TIRED OR HAVING LITTLE ENERGY: 0
10. IF YOU CHECKED OFF ANY PROBLEMS, HOW DIFFICULT HAVE THESE PROBLEMS MADE IT FOR YOU TO DO YOUR WORK, TAKE CARE OF THINGS AT HOME, OR GET ALONG WITH OTHER PEOPLE: 0
SUM OF ALL RESPONSES TO PHQ QUESTIONS 1-9: 0
1. LITTLE INTEREST OR PLEASURE IN DOING THINGS: 0
9. THOUGHTS THAT YOU WOULD BE BETTER OFF DEAD, OR OF HURTING YOURSELF: 0
7. TROUBLE CONCENTRATING ON THINGS, SUCH AS READING THE NEWSPAPER OR WATCHING TELEVISION: 0
SUM OF ALL RESPONSES TO PHQ QUESTIONS 1-9: 0
8. MOVING OR SPEAKING SO SLOWLY THAT OTHER PEOPLE COULD HAVE NOTICED. OR THE OPPOSITE, BEING SO FIGETY OR RESTLESS THAT YOU HAVE BEEN MOVING AROUND A LOT MORE THAN USUAL: 0
5. POOR APPETITE OR OVEREATING: 0
SUM OF ALL RESPONSES TO PHQ QUESTIONS 1-9: 0

## 2022-01-21 NOTE — PROGRESS NOTES
history and physical and/or concerning medications. Bring any test results/reports from your physicians office. If you are under the care of a heart doctor or specialist doctor, please be aware that you may be asked to them for clearance    You may be asked to stop blood thinners such as Coumadin, Plavix, Fragmin, Lovenox, etc., or any anti-inflammatories such as:  Aspirin, Ibuprofen, Advil, Naproxen prior to your surgery. We also ask that you stop any OTC medications such as fish oil, vitamin E, glucosamine, garlic, Multivitamins, COQ 10, etc.    We ask that you do not smoke 24 hours prior to surgery  We ask that you do not  drink any alcoholic beverages 24 hours prior to surgery     You must make arrangements for a responsible adult to take you home after your surgery. For your safety you will not be allowed to leave alone or drive yourself home. Your surgery will be cancelled if you do not have a ride home. Also for your safety, it is strongly suggested that someone stay with you the first 24 hours after your surgery. A parent or legal guardian must accompany a child scheduled for surgery and plan to stay at the hospital until the child is discharged. Please do not bring other children with you. For your comfort, please wear simple loose fitting clothing to the hospital.  Please do not bring valuables. Do not wear any make-up or nail polish on your fingers or toes      For your safety, please do not wear any jewelry or body piercing's on the day of surgery. All jewelry must be removed. If you have dentures, they will be removed before going to operating room. For your convenience, we will provide you with a container. If you wear contact lenses or glasses, they will be removed, please bring a case for them. If you have a living will and a durable power of  for healthcare, please bring in a copy.      As part of our patient safety program to minimize surgical site infections, we ask you to do the following:    · Please notify your surgeon if you develop any illness between         now and the  day of your surgery. · This includes a cough, cold, fever, sore throat, nausea,         or vomiting, and diarrhea, etc.  ·  Please notify your surgeon if you experience dizziness, shortness         of breath or blurred vision between now and the time of your surgery. Do not shave your operative site 96 hours prior to surgery. For face and neck surgery, men may use an electric razor 48 hours   prior to surgery. You may shower the night before surgery or the morning of   your surgery with an antibacterial soap. You will need to bring a photo ID and insurance card    [facility] has an onsite pharmacy, would you like to utilize our pharmacy     If you will be staying overnight and use a C-pap machine, please bring   your C-pap to hospital     Our goal is to provide you with excellent care, therefore, visitors will be limited to two(2) in the room at a time so that we may focus on providing this care for you. Please contact pre-admission testing if you have any further questions. [facility] phone number:  [phone] PAT fax number:  [phone]  Please note these are generalized instructions for all surgical cases, you may be provided with more specific instructions according to your surgery.         Unfortunately due the rise in covid cases, staffing crisis and hospital capacity, your procedure may need to be rescheduled--if this were to happen your Surgeons office will notify you ASAP

## 2022-01-21 NOTE — PROGRESS NOTES
2022     Esther Ruff (:  1965) is a 60 y.o. female, here for evaluation of the following medical concerns:    HPI     Left shoulder surgery 22. 1.  Cardiac concerns- Ability to climb stairs? Yes, Walk up a hill? Yes, Do heavy lifting around the house? Yes,  Exercise tolerance? yes, History of chest pain, NO or SOB, No, has hx of wheezing , NO,  symptoms of sleep apnea, No , Family history of heart disease? No     2.  Problems with anesthesia? No, Family history of problems with anesthesia? No, Alcohol use? No,   Tobacco use? No , Drug  Use? No,  no hx of  bleeding disorder, clotting     3. Patient has several medical conditions that are treated and stable with medication.  she feels well today and doesn't have a new complaint.        Today, denied chest pain, sob, n, v, or diarrhea. Review of Systems   Constitutional: Negative for activity change, fatigue, fever and unexpected weight change. HENT: Negative for congestion, ear pain, facial swelling, mouth sores, rhinorrhea, sinus pressure, sore throat and trouble swallowing. Eyes: Negative for visual disturbance. Respiratory: Negative for cough, chest tightness, shortness of breath and wheezing. Cardiovascular: Negative for chest pain and leg swelling. Gastrointestinal: Negative for abdominal pain, diarrhea, nausea and vomiting. Endocrine: Negative for cold intolerance, heat intolerance, polydipsia and polyphagia. Musculoskeletal: Negative for arthralgias. Skin: Negative for rash. Allergic/Immunologic: Negative for food allergies. Neurological: Positive for headaches. Negative for dizziness, tremors, syncope and numbness. Psychiatric/Behavioral: Negative for suicidal ideas. The patient is not nervous/anxious. Prior to Visit Medications    Medication Sig Taking? Authorizing Provider   methylPREDNISolone (MEDROL, ELLIE,) 4 MG tablet Take by mouth.  6 po day one 5 po day 2 4 po day 3 3 po day 4 2 po day 5 1 po day 6. Yes RHONA Drew   tiZANidine (ZANAFLEX) 4 MG tablet Take 1 tablet by mouth 4 times daily as needed (spasm) Yes RHONA Drew   PARoxetine (PAXIL) 30 MG tablet TAKE 1 TABLET BY MOUTH EVERY DAY Yes Soy Phillips,    levothyroxine (SYNTHROID) 100 MCG tablet TAKE 1 TABLET BY MOUTH EVERY DAY Yes Soy Phillips, DO   lisinopril (PRINIVIL;ZESTRIL) 10 MG tablet TAKE 1 TABLET BY MOUTH EVERY DAY Yes Soy Phillips DO   omeprazole (PRILOSEC) 40 MG delayed release capsule TAKE 1 CAPSULE BY MOUTH EVERY DAY Yes Soy Phillips, DO   rosuvastatin (CRESTOR) 20 MG tablet TAKE 1 TABLET BY MOUTH EVERY DAY Yes Soy Phillips, DO   calcium carbonate (OSCAL) 500 MG TABS tablet Take 500 mg by mouth daily Yes Historical Provider, MD   magnesium 30 MG tablet Take 30 mg by mouth 2 times daily Yes Historical Provider, MD   BIOTIN 5000 PO Take 5,000 mg by mouth daily  Yes Historical Provider, MD   Multiple Vitamins-Minerals (CENTRUM SILVER PO) Take by mouth Yes Historical Provider, MD        Social History     Tobacco Use    Smoking status: Former Smoker     Packs/day: 0.50     Years: 10.00     Pack years: 5.00     Types: Cigarettes     Quit date:      Years since quittin.0    Smokeless tobacco: Never Used   Substance Use Topics    Alcohol use: Never     Comment: Occ        Vitals:    22 0826   BP: 120/68   Pulse: 71   SpO2: 97%   Weight: 226 lb 3.2 oz (102.6 kg)     Estimated body mass index is 38.83 kg/m² as calculated from the following:    Height as of 22: 5' 4\" (1.626 m). Weight as of this encounter: 226 lb 3.2 oz (102.6 kg). Physical Exam    ASSESSMENT/PLAN:  There are no diagnoses linked to this encounter. No follow-ups on file.

## 2022-01-21 NOTE — PROGRESS NOTES
DOS 1/28/22    Called Dr José Luis Spears office spoke with Galina Marquez , she said he will signoff on chart and clear patient when he gets time .

## 2022-01-24 ENCOUNTER — APPOINTMENT (OUTPATIENT)
Dept: PHYSICAL THERAPY | Age: 57
End: 2022-01-24
Payer: COMMERCIAL

## 2022-01-26 ENCOUNTER — APPOINTMENT (OUTPATIENT)
Dept: PHYSICAL THERAPY | Age: 57
End: 2022-01-26
Payer: COMMERCIAL

## 2022-01-27 ENCOUNTER — ANESTHESIA EVENT (OUTPATIENT)
Dept: OPERATING ROOM | Age: 57
End: 2022-01-27
Payer: COMMERCIAL

## 2022-01-28 ENCOUNTER — HOSPITAL ENCOUNTER (OUTPATIENT)
Age: 57
Setting detail: OUTPATIENT SURGERY
Discharge: HOME | End: 2022-01-28
Attending: ORTHOPAEDIC SURGERY | Admitting: ORTHOPAEDIC SURGERY
Payer: COMMERCIAL

## 2022-01-28 ENCOUNTER — ANESTHESIA (OUTPATIENT)
Dept: OPERATING ROOM | Age: 57
End: 2022-01-28
Payer: COMMERCIAL

## 2022-01-28 VITALS
BODY MASS INDEX: 37.98 KG/M2 | DIASTOLIC BLOOD PRESSURE: 78 MMHG | WEIGHT: 222.44 LBS | SYSTOLIC BLOOD PRESSURE: 123 MMHG | TEMPERATURE: 97.5 F | OXYGEN SATURATION: 99 % | HEIGHT: 64 IN | RESPIRATION RATE: 16 BRPM | HEART RATE: 78 BPM

## 2022-01-28 VITALS
TEMPERATURE: 96.8 F | RESPIRATION RATE: 6 BRPM | OXYGEN SATURATION: 89 % | DIASTOLIC BLOOD PRESSURE: 72 MMHG | SYSTOLIC BLOOD PRESSURE: 119 MMHG

## 2022-01-28 DIAGNOSIS — M75.82 ROTATOR CUFF TENDINITIS, LEFT: Primary | ICD-10-CM

## 2022-01-28 DIAGNOSIS — J32.9 SINUSITIS, UNSPECIFIED CHRONICITY, UNSPECIFIED LOCATION: Primary | ICD-10-CM

## 2022-01-28 DIAGNOSIS — M75.42 SHOULDER IMPINGEMENT SYNDROME, LEFT: ICD-10-CM

## 2022-01-28 PROCEDURE — 7100000000 HC PACU RECOVERY - FIRST 15 MIN: Performed by: ORTHOPAEDIC SURGERY

## 2022-01-28 PROCEDURE — 3600000014 HC SURGERY LEVEL 4 ADDTL 15MIN: Performed by: ORTHOPAEDIC SURGERY

## 2022-01-28 PROCEDURE — 2500000003 HC RX 250 WO HCPCS: Performed by: NURSE ANESTHETIST, CERTIFIED REGISTERED

## 2022-01-28 PROCEDURE — 6370000000 HC RX 637 (ALT 250 FOR IP): Performed by: ORTHOPAEDIC SURGERY

## 2022-01-28 PROCEDURE — 2580000003 HC RX 258: Performed by: ORTHOPAEDIC SURGERY

## 2022-01-28 PROCEDURE — 7100000001 HC PACU RECOVERY - ADDTL 15 MIN: Performed by: ORTHOPAEDIC SURGERY

## 2022-01-28 PROCEDURE — 2580000003 HC RX 258: Performed by: ANESTHESIOLOGY

## 2022-01-28 PROCEDURE — 7100000010 HC PHASE II RECOVERY - FIRST 15 MIN: Performed by: ORTHOPAEDIC SURGERY

## 2022-01-28 PROCEDURE — 6360000002 HC RX W HCPCS: Performed by: NURSE ANESTHETIST, CERTIFIED REGISTERED

## 2022-01-28 PROCEDURE — 3600000004 HC SURGERY LEVEL 4 BASE: Performed by: ORTHOPAEDIC SURGERY

## 2022-01-28 PROCEDURE — 3700000000 HC ANESTHESIA ATTENDED CARE: Performed by: ORTHOPAEDIC SURGERY

## 2022-01-28 PROCEDURE — 64415 NJX AA&/STRD BRCH PLXS IMG: CPT | Performed by: ANESTHESIOLOGY

## 2022-01-28 PROCEDURE — 6360000002 HC RX W HCPCS: Performed by: ORTHOPAEDIC SURGERY

## 2022-01-28 PROCEDURE — 2709999900 HC NON-CHARGEABLE SUPPLY: Performed by: ORTHOPAEDIC SURGERY

## 2022-01-28 PROCEDURE — A4217 STERILE WATER/SALINE, 500 ML: HCPCS | Performed by: ORTHOPAEDIC SURGERY

## 2022-01-28 PROCEDURE — 7100000011 HC PHASE II RECOVERY - ADDTL 15 MIN: Performed by: ORTHOPAEDIC SURGERY

## 2022-01-28 PROCEDURE — 2720000010 HC SURG SUPPLY STERILE: Performed by: ORTHOPAEDIC SURGERY

## 2022-01-28 PROCEDURE — L3660 SO 8 AB RSTR CAN/WEB PRE OTS: HCPCS | Performed by: ORTHOPAEDIC SURGERY

## 2022-01-28 PROCEDURE — 3700000001 HC ADD 15 MINUTES (ANESTHESIA): Performed by: ORTHOPAEDIC SURGERY

## 2022-01-28 PROCEDURE — 29822 SHO ARTHRS SRG LMTD DBRDMT: CPT | Performed by: ORTHOPAEDIC SURGERY

## 2022-01-28 PROCEDURE — 6360000002 HC RX W HCPCS: Performed by: ANESTHESIOLOGY

## 2022-01-28 PROCEDURE — 2500000003 HC RX 250 WO HCPCS

## 2022-01-28 RX ORDER — GLYCOPYRROLATE 0.2 MG/ML
INJECTION INTRAMUSCULAR; INTRAVENOUS PRN
Status: DISCONTINUED | OUTPATIENT
Start: 2022-01-28 | End: 2022-01-28 | Stop reason: SDUPTHER

## 2022-01-28 RX ORDER — PROPOFOL 10 MG/ML
INJECTION, EMULSION INTRAVENOUS PRN
Status: DISCONTINUED | OUTPATIENT
Start: 2022-01-28 | End: 2022-01-28 | Stop reason: SDUPTHER

## 2022-01-28 RX ORDER — SODIUM CHLORIDE 9 MG/ML
INJECTION, SOLUTION INTRAVENOUS CONTINUOUS
Status: DISCONTINUED | OUTPATIENT
Start: 2022-01-28 | End: 2022-01-28 | Stop reason: HOSPADM

## 2022-01-28 RX ORDER — SUCCINYLCHOLINE/SOD CL,ISO/PF 200MG/10ML
SYRINGE (ML) INTRAVENOUS PRN
Status: DISCONTINUED | OUTPATIENT
Start: 2022-01-28 | End: 2022-01-28 | Stop reason: SDUPTHER

## 2022-01-28 RX ORDER — FENTANYL CITRATE 50 UG/ML
INJECTION, SOLUTION INTRAMUSCULAR; INTRAVENOUS PRN
Status: DISCONTINUED | OUTPATIENT
Start: 2022-01-28 | End: 2022-01-28 | Stop reason: SDUPTHER

## 2022-01-28 RX ORDER — POVIDONE-IODINE 10 MG/G
OINTMENT TOPICAL
Status: COMPLETED | OUTPATIENT
Start: 2022-01-28 | End: 2022-01-28

## 2022-01-28 RX ORDER — HYDRALAZINE HYDROCHLORIDE 20 MG/ML
5 INJECTION INTRAMUSCULAR; INTRAVENOUS EVERY 10 MIN PRN
Status: DISCONTINUED | OUTPATIENT
Start: 2022-01-28 | End: 2022-01-28 | Stop reason: HOSPADM

## 2022-01-28 RX ORDER — HYDROCODONE BITARTRATE AND ACETAMINOPHEN 5; 325 MG/1; MG/1
1 TABLET ORAL PRN
Status: DISCONTINUED | OUTPATIENT
Start: 2022-01-28 | End: 2022-01-28 | Stop reason: HOSPADM

## 2022-01-28 RX ORDER — AZITHROMYCIN 250 MG/1
250 TABLET, FILM COATED ORAL SEE ADMIN INSTRUCTIONS
Qty: 6 TABLET | Refills: 0 | Status: SHIPPED | OUTPATIENT
Start: 2022-01-28 | End: 2022-02-02

## 2022-01-28 RX ORDER — MAGNESIUM HYDROXIDE 1200 MG/15ML
LIQUID ORAL CONTINUOUS PRN
Status: COMPLETED | OUTPATIENT
Start: 2022-01-28 | End: 2022-01-28

## 2022-01-28 RX ORDER — OXYCODONE HYDROCHLORIDE AND ACETAMINOPHEN 5; 325 MG/1; MG/1
1 TABLET ORAL EVERY 4 HOURS PRN
Qty: 40 TABLET | Refills: 0 | Status: SHIPPED | OUTPATIENT
Start: 2022-01-28 | End: 2022-02-11 | Stop reason: SDUPTHER

## 2022-01-28 RX ORDER — PROMETHAZINE HYDROCHLORIDE 25 MG/1
25 TABLET ORAL EVERY 6 HOURS PRN
Qty: 5 TABLET | Refills: 0 | Status: SHIPPED | OUTPATIENT
Start: 2022-01-28 | End: 2022-05-31

## 2022-01-28 RX ORDER — HYDROCODONE BITARTRATE AND ACETAMINOPHEN 5; 325 MG/1; MG/1
2 TABLET ORAL PRN
Status: DISCONTINUED | OUTPATIENT
Start: 2022-01-28 | End: 2022-01-28 | Stop reason: HOSPADM

## 2022-01-28 RX ORDER — ROPIVACAINE HYDROCHLORIDE 5 MG/ML
INJECTION, SOLUTION EPIDURAL; INFILTRATION; PERINEURAL
Status: COMPLETED | OUTPATIENT
Start: 2022-01-28 | End: 2022-01-28

## 2022-01-28 RX ORDER — DEXAMETHASONE SODIUM PHOSPHATE 4 MG/ML
INJECTION, SOLUTION INTRA-ARTICULAR; INTRALESIONAL; INTRAMUSCULAR; INTRAVENOUS; SOFT TISSUE PRN
Status: DISCONTINUED | OUTPATIENT
Start: 2022-01-28 | End: 2022-01-28 | Stop reason: SDUPTHER

## 2022-01-28 RX ORDER — PAROXETINE 30 MG/1
TABLET, FILM COATED ORAL
Qty: 90 TABLET | Refills: 1 | Status: SHIPPED | OUTPATIENT
Start: 2022-01-28 | End: 2022-10-31

## 2022-01-28 RX ORDER — SODIUM CHLORIDE 0.9 % (FLUSH) 0.9 %
10 SYRINGE (ML) INJECTION EVERY 12 HOURS SCHEDULED
Status: DISCONTINUED | OUTPATIENT
Start: 2022-01-28 | End: 2022-01-28 | Stop reason: HOSPADM

## 2022-01-28 RX ORDER — PHENYLEPHRINE HCL IN 0.9% NACL 1 MG/10 ML
SYRINGE (ML) INTRAVENOUS PRN
Status: DISCONTINUED | OUTPATIENT
Start: 2022-01-28 | End: 2022-01-28 | Stop reason: SDUPTHER

## 2022-01-28 RX ORDER — SODIUM CHLORIDE 9 MG/ML
25 INJECTION, SOLUTION INTRAVENOUS PRN
Status: DISCONTINUED | OUTPATIENT
Start: 2022-01-28 | End: 2022-01-28 | Stop reason: HOSPADM

## 2022-01-28 RX ORDER — FENTANYL CITRATE 50 UG/ML
25 INJECTION, SOLUTION INTRAMUSCULAR; INTRAVENOUS EVERY 5 MIN PRN
Status: DISCONTINUED | OUTPATIENT
Start: 2022-01-28 | End: 2022-01-28 | Stop reason: HOSPADM

## 2022-01-28 RX ORDER — LIDOCAINE HYDROCHLORIDE 20 MG/ML
INJECTION, SOLUTION EPIDURAL; INFILTRATION; INTRACAUDAL; PERINEURAL PRN
Status: DISCONTINUED | OUTPATIENT
Start: 2022-01-28 | End: 2022-01-28 | Stop reason: SDUPTHER

## 2022-01-28 RX ORDER — MIDAZOLAM HYDROCHLORIDE 1 MG/ML
INJECTION INTRAMUSCULAR; INTRAVENOUS PRN
Status: DISCONTINUED | OUTPATIENT
Start: 2022-01-28 | End: 2022-01-28 | Stop reason: SDUPTHER

## 2022-01-28 RX ORDER — SODIUM CHLORIDE 0.9 % (FLUSH) 0.9 %
10 SYRINGE (ML) INJECTION PRN
Status: DISCONTINUED | OUTPATIENT
Start: 2022-01-28 | End: 2022-01-28 | Stop reason: HOSPADM

## 2022-01-28 RX ORDER — ONDANSETRON 2 MG/ML
INJECTION INTRAMUSCULAR; INTRAVENOUS PRN
Status: DISCONTINUED | OUTPATIENT
Start: 2022-01-28 | End: 2022-01-28 | Stop reason: SDUPTHER

## 2022-01-28 RX ORDER — FENTANYL CITRATE 50 UG/ML
50 INJECTION, SOLUTION INTRAMUSCULAR; INTRAVENOUS EVERY 5 MIN PRN
Status: DISCONTINUED | OUTPATIENT
Start: 2022-01-28 | End: 2022-01-28 | Stop reason: HOSPADM

## 2022-01-28 RX ORDER — MEPERIDINE HYDROCHLORIDE 25 MG/ML
12.5 INJECTION INTRAMUSCULAR; INTRAVENOUS; SUBCUTANEOUS
Status: DISCONTINUED | OUTPATIENT
Start: 2022-01-28 | End: 2022-01-28 | Stop reason: HOSPADM

## 2022-01-28 RX ORDER — PROMETHAZINE HYDROCHLORIDE 25 MG/ML
6.25 INJECTION, SOLUTION INTRAMUSCULAR; INTRAVENOUS
Status: DISCONTINUED | OUTPATIENT
Start: 2022-01-28 | End: 2022-01-28 | Stop reason: HOSPADM

## 2022-01-28 RX ORDER — ONDANSETRON 2 MG/ML
4 INJECTION INTRAMUSCULAR; INTRAVENOUS
Status: DISCONTINUED | OUTPATIENT
Start: 2022-01-28 | End: 2022-01-28 | Stop reason: HOSPADM

## 2022-01-28 RX ADMIN — ONDANSETRON 4 MG: 2 INJECTION INTRAMUSCULAR; INTRAVENOUS at 10:00

## 2022-01-28 RX ADMIN — MIDAZOLAM 2 MG: 1 INJECTION INTRAMUSCULAR; INTRAVENOUS at 08:59

## 2022-01-28 RX ADMIN — FENTANYL CITRATE 50 MCG: 50 INJECTION INTRAMUSCULAR; INTRAVENOUS at 09:54

## 2022-01-28 RX ADMIN — FENTANYL CITRATE 50 MCG: 50 INJECTION INTRAMUSCULAR; INTRAVENOUS at 08:59

## 2022-01-28 RX ADMIN — PROPOFOL 150 MG: 10 INJECTION, EMULSION INTRAVENOUS at 09:54

## 2022-01-28 RX ADMIN — SODIUM CHLORIDE: 9 INJECTION, SOLUTION INTRAVENOUS at 07:56

## 2022-01-28 RX ADMIN — ROPIVACAINE HYDROCHLORIDE 30 ML: 5 INJECTION, SOLUTION EPIDURAL; INFILTRATION; PERINEURAL at 08:59

## 2022-01-28 RX ADMIN — Medication 100 MG: at 09:54

## 2022-01-28 RX ADMIN — Medication 100 MCG: at 10:18

## 2022-01-28 RX ADMIN — GLYCOPYRROLATE 0.2 MG: 0.2 INJECTION, SOLUTION INTRAMUSCULAR; INTRAVENOUS at 10:15

## 2022-01-28 RX ADMIN — Medication 100 MCG: at 10:29

## 2022-01-28 RX ADMIN — Medication 100 MCG: at 10:35

## 2022-01-28 RX ADMIN — Medication 50 MCG: at 10:20

## 2022-01-28 RX ADMIN — Medication 100 MCG: at 10:11

## 2022-01-28 RX ADMIN — LIDOCAINE HYDROCHLORIDE 60 MG: 20 INJECTION, SOLUTION EPIDURAL; INFILTRATION; INTRACAUDAL; PERINEURAL at 09:54

## 2022-01-28 RX ADMIN — DEXAMETHASONE SODIUM PHOSPHATE 8 MG: 4 INJECTION, SOLUTION INTRAMUSCULAR; INTRAVENOUS at 10:00

## 2022-01-28 RX ADMIN — CEFAZOLIN SODIUM 2000 MG: 10 INJECTION, POWDER, FOR SOLUTION INTRAVENOUS at 09:49

## 2022-01-28 RX ADMIN — SODIUM CHLORIDE: 9 INJECTION, SOLUTION INTRAVENOUS at 09:48

## 2022-01-28 ASSESSMENT — PULMONARY FUNCTION TESTS
PIF_VALUE: 17
PIF_VALUE: 20
PIF_VALUE: 17
PIF_VALUE: 20
PIF_VALUE: 20
PIF_VALUE: 19
PIF_VALUE: 0
PIF_VALUE: 20
PIF_VALUE: 20
PIF_VALUE: 2
PIF_VALUE: 10
PIF_VALUE: 16
PIF_VALUE: 17
PIF_VALUE: 20
PIF_VALUE: 17
PIF_VALUE: 0
PIF_VALUE: 20
PIF_VALUE: 6
PIF_VALUE: 20
PIF_VALUE: 17
PIF_VALUE: 18
PIF_VALUE: 17
PIF_VALUE: 20
PIF_VALUE: 17
PIF_VALUE: 19
PIF_VALUE: 18
PIF_VALUE: 17
PIF_VALUE: 16
PIF_VALUE: 18
PIF_VALUE: 17
PIF_VALUE: 2
PIF_VALUE: 20
PIF_VALUE: 1
PIF_VALUE: 19
PIF_VALUE: 17
PIF_VALUE: 20
PIF_VALUE: 3
PIF_VALUE: 2
PIF_VALUE: 20
PIF_VALUE: 20
PIF_VALUE: 23
PIF_VALUE: 0
PIF_VALUE: 20
PIF_VALUE: 0
PIF_VALUE: 17
PIF_VALUE: 20
PIF_VALUE: 17
PIF_VALUE: 17
PIF_VALUE: 20
PIF_VALUE: 17
PIF_VALUE: 20
PIF_VALUE: 17
PIF_VALUE: 19
PIF_VALUE: 2
PIF_VALUE: 4
PIF_VALUE: 18
PIF_VALUE: 17
PIF_VALUE: 2
PIF_VALUE: 20

## 2022-01-28 ASSESSMENT — PAIN SCALES - GENERAL
PAINLEVEL_OUTOF10: 0
PAINLEVEL_OUTOF10: 0

## 2022-01-28 ASSESSMENT — PAIN - FUNCTIONAL ASSESSMENT: PAIN_FUNCTIONAL_ASSESSMENT: 0-10

## 2022-01-28 ASSESSMENT — LIFESTYLE VARIABLES: SMOKING_STATUS: 0

## 2022-01-28 NOTE — PROGRESS NOTES
Pt groggy on arrival to phase II. VSS. Pt denies pain in left shoulder but complain of discomfort in jaw and throat. Pt states \"I don't think the ice cuff is working. It is not cold. \" Repositioned cryo cuff and cold infusing. Given ice water and cookies and call light.  to room.

## 2022-01-28 NOTE — PROGRESS NOTES
CLINICAL PHARMACY NOTE: MEDS TO BEDS    Total # of Prescriptions Filled: 2   The following medications were delivered to the patient:  Current Discharge Medication List      START taking these medications    Details   oxyCODONE-acetaminophen (PERCOCET) 5-325 MG per tablet Take 1 tablet by mouth every 4 hours as needed for Pain for up to 7 days. Qty: 40 tablet, Refills: 0    Comments: Reduce doses taken as pain becomes manageable  Associated Diagnoses: Rotator cuff tendinitis, left;  Shoulder impingement syndrome, left      promethazine (PHENERGAN) 25 MG tablet Take 1 tablet by mouth every 6 hours as needed for Nausea  Qty: 5 tablet, Refills: 0      azithromycin (ZITHROMAX) 250 MG tablet Take 1 tablet by mouth See Admin Instructions for 5 days 500mg on day 1 followed by 250mg on days 2 - 5  Qty: 6 tablet, Refills: 0    Associated Diagnoses: Sinusitis, unspecified chronicity, unspecified location         · Azithromycin sent to [place]  ·     Additional Documentation:

## 2022-01-28 NOTE — ANESTHESIA PROCEDURE NOTES
Peripheral Block    Patient location during procedure: pre-op  Staffing  Performed: anesthesiologist   Anesthesiologist: Danette Weston MD  Preanesthetic Checklist  Completed: patient identified, IV checked, site marked, risks and benefits discussed, surgical consent, monitors and equipment checked, pre-op evaluation, timeout performed, anesthesia consent given, oxygen available and patient being monitored  Peripheral Block  Patient position: sitting  Prep: ChloraPrep  Patient monitoring: cardiac monitor, continuous pulse ox, frequent blood pressure checks and IV access  Block type: Brachial plexus  Laterality: left  Injection technique: single-shot  Guidance: ultrasound guided  Interscalene  Provider prep: mask and sterile gloves  Needle  Needle gauge: 21 G  Needle length: 10 cm  Needle localization: ultrasound guidance  Assessment  Injection assessment: negative aspiration for heme, no paresthesia on injection and local visualized surrounding nerve on ultrasound  Paresthesia pain: none  Slow fractionated injection: yes  Hemodynamics: stable  Additional Notes  Immediately prior to procedure a \"time out\" was called to verify the correct patient, allergies, laterality, procedure and equipment. Time out performed with Jose D Sharma RN    Local Anesthetic: 0.5 %  Ropivacaine   Amount: 30 ml  in 5 ml increments after negative aspiration each time. Anterior scalene and middle scalene muscles, upper, middle and lower trunks of the brachial plexus are identified, the tip of the needle and the spread of the local anesthetic around the brachial plexus are visualized. The Brachial plexus appeared to be anatomically normal and there were no abnormal pathologically findings seen.          Medications Administered  Ropivacaine (NAROPIN) injection 0.5%, 30 mL  Reason for block: post-op pain management and at surgeon's request

## 2022-01-28 NOTE — PROGRESS NOTES
Pt more awake. Complain of sore throat. Given jello and more ice water.  in room. Called retail pharmacy and spoke to Sage Lozano for prescriptions.

## 2022-01-28 NOTE — ANESTHESIA PRE PROCEDURE
[facility] Department of Anesthesiology  Pre-Anesthesia Evaluation/Consultation       Name:  Annabel Yusuf  : 1965  Age:  60 y.o. MRN:  [id number]  Date: 2022           Surgeon: Surgeon(s):  Inderjit Pond MD    Procedure: Procedure(s):  LEFT SHOULDER ARTHROSCOPY, SUBACROMIAL DECOMPRESSION, POSSIBLE LABRAL DEBRIDEMENT     Allergies   Allergen Reactions    Egg Shells      Other reaction(s): GI Upset  Pt states that eating eggs (yolk) makes her sick \"all day long\" where she has to lay down. States is able to take the flu shot. Patient Active Problem List   Diagnosis    Polyp of colon    Severe dysplasia of cervix (JOMAR III)    Acquired hypothyroidism    Mixed hyperlipidemia    Essential hypertension    Overactive bladder    Moderate episode of recurrent major depressive disorder (HCC)    Hashimoto's thyroiditis    Squamous cell carcinoma of cervix (HCC)    Anxiety    Lymphocytic colitis    MCI (mild cognitive impairment) with memory loss    Shoulder impingement syndrome, left    Rotator cuff tendinitis, left    Lumbar facet arthropathy    DDD (degenerative disc disease), lumbar     Past Medical History:   Diagnosis Date    Anxiety     Cancer ([facility] [address] 2018    Cervical    Hypertension     Hyperthyroidism      Past Surgical History:   Procedure Laterality Date    COLONOSCOPY      HYSTERECTOMY  2018    Robotic-assisted Total Laparoscopic Hysterectomy with Bilateral Salpingo-oophorectomy    TONSILLECTOMY      TUBAL LIGATION       Social History     Tobacco Use    Smoking status: Former Smoker     Packs/day: 0.50     Years: 10.00     Pack years: 5.00     Types: Cigarettes     Quit date:      Years since quittin.0    Smokeless tobacco: Never Used   Vaping Use    Vaping Use: Never used   Substance Use Topics    Alcohol use: Never     Comment:  Occ    Drug use: No     Medications  No current facility-administered medications on file prior to encounter.      Current Outpatient Medications on File Prior to Encounter   Medication Sig Dispense Refill    tiZANidine (ZANAFLEX) 4 MG tablet Take 1 tablet by mouth 4 times daily as needed (spasm) 60 tablet 0    PARoxetine (PAXIL) 30 MG tablet TAKE 1 TABLET BY MOUTH EVERY DAY 30 tablet 2    levothyroxine (SYNTHROID) 100 MCG tablet TAKE 1 TABLET BY MOUTH EVERY DAY 90 tablet 1    lisinopril (PRINIVIL;ZESTRIL) 10 MG tablet TAKE 1 TABLET BY MOUTH EVERY DAY 90 tablet 1    omeprazole (PRILOSEC) 40 MG delayed release capsule TAKE 1 CAPSULE BY MOUTH EVERY DAY 90 capsule 1    rosuvastatin (CRESTOR) 20 MG tablet TAKE 1 TABLET BY MOUTH EVERY DAY 90 tablet 1    calcium carbonate (OSCAL) 500 MG TABS tablet Take 500 mg by mouth daily      magnesium 30 MG tablet Take 30 mg by mouth 2 times daily      Multiple Vitamins-Minerals (CENTRUM SILVER PO) Take by mouth       Current Facility-Administered Medications   Medication Dose Route Frequency Provider Last Rate Last Admin    ceFAZolin (ANCEF) 2000 mg in dextrose 5 % 100 mL IVPB  2,000 mg IntraVENous Once John MD Abril        0.9 % sodium chloride infusion   IntraVENous Continuous Sophie Cobb MD        sodium chloride flush 0.9 % injection 10 mL  10 mL IntraVENous 2 times per day Sophie Cobb MD        sodium chloride flush 0.9 % injection 10 mL  10 mL IntraVENous PRN Sophie Cobb MD        0.9 % sodium chloride infusion  25 mL IntraVENous PRN Sophie Cobb MD         Vital Signs (Current)   Vitals:    22 1300 22 0737   BP:  129/73   Pulse:  77   Resp:  16   Temp:  96.9 °F (36.1 °C)   TempSrc:  Temporal   SpO2:  98%   Weight: 225 lb (102.1 kg) 222 lb 7.1 oz (100.9 kg)   Height: 5' 4\" (1.626 m) 5' 4\" (1.626 m)                                            Vital Signs Statistics (for past 48 hrs)     Temp  Av.9 °F (36.1 °C)  Min: 96.9 °F (36.1 °C)   Min taken time: 22 0737  Max: 96.9 °F (36.1 °C)   Max taken time: 22  Pulse  Av  Min: 68   Min taken time: 22  Max: 68   Max taken time: 22  Resp  Av  Min: 12   Min taken time: 22  Max: 16   Max taken time: 22  BP  Min: 129/73   Min taken time: 22  Max: 129/73   Max taken time: 22  SpO2  Av %  Min: 98 %   Min taken time: 22  Max: 98 %   Max taken time: 22  BP Readings from Last 3 Encounters:   22 129/73   22 120/68   10/20/21 126/80       BMI  Body mass index is 38.18 kg/m². Estimated body mass index is 38.18 kg/m² as calculated from the following:    Height as of this encounter: 5' 4\" (1.626 m). Weight as of this encounter: 222 lb 7.1 oz (100.9 kg). CBC   Lab Results   Component Value Date    WBC 5.8 2022    RBC 4.42 2022    HGB 14.0 2022    HCT 41.1 2022    MCV 93.0 2022    RDW 13.0 2022     2022     CMP    Lab Results   Component Value Date     2022    K 4.4 2022     2022    CO2 24 2022    BUN 17 2022    CREATININE 0.7 2022    GFRAA >60 2022    AGRATIO 2.1 2022    LABGLOM >60 2022    GLUCOSE 100 2022    PROT 6.6 2022    CALCIUM 9.3 2022    BILITOT 0.4 2022    ALKPHOS 64 2022    AST 13 2022    ALT 16 2022     BMP    Lab Results   Component Value Date     2022    K 4.4 2022     2022    CO2 24 2022    BUN 17 2022    CREATININE 0.7 2022    CALCIUM 9.3 2022    GFRAA >60 2022    LABGLOM >60 2022    GLUCOSE 100 2022     POCGlucose  No results for input(s): GLUCOSE in the last 72 hours.    Coags  No results found for: PROTIME, INR, APTT  HCG (If Applicable) No results found for: PREGTESTUR, PREGSERUM, HCG, HCGQUANT   ABGs No results found for: PHART, PO2ART, UWM5PDJ, WMP5SBG, BEART, H1IYCYWX   Type & Screen (If Applicable)  No results found for: Fish Small                         BMI: Wt Readings from Last 3 Encounters:       NPO Status:   Date of last liquid consumption: 01/27/22   Time of last liquid consumption: 2230   Date of last solid food consumption: 01/27/22      Time of last solid consumption: 2000       Anesthesia Evaluation  Patient summary reviewed no history of anesthetic complications:   Airway: Mallampati: II  TM distance: >3 FB   Neck ROM: full  Mouth opening: > = 3 FB Dental: normal exam         Pulmonary: breath sounds clear to auscultation      (-) COPD, asthma, sleep apnea and not a current smoker                           Cardiovascular:    (+) hypertension:, hyperlipidemia    (-) past MI, CABG/stent and  angina        Rate: normal                    Neuro/Psych:   (+) depression/anxiety    (-) seizures, TIA and CVA           GI/Hepatic/Renal:        (-) GERD, liver disease and no renal disease       Endo/Other:    (+) hypothyroidism::., malignancy/cancer. (-) diabetes mellitus               Abdominal:             Vascular:     - DVT and PE. Other Findings:             Anesthesia Plan      general and regional     ASA 3       Induction: intravenous. MIPS: Postoperative opioids intended and Prophylactic antiemetics administered. Anesthetic plan and risks discussed with patient. Plan discussed with CRNA. This pre-anesthesia assessment may be used as a history and physical.    DOS STAFF ADDENDUM:    Pt seen and examined, chart reviewed (including anesthesia, drug and allergy history). No interval changes to history and physical examination. Anesthetic plan, risks, benefits, alternatives, and personnel involved discussed with patient. Patient verbalized an understanding and agrees to proceed.       Sophy Zhu MD  January 28, 2022  7:44 AM

## 2022-01-28 NOTE — H&P
Preoperative H&P Update    The patient's History and Physical in the medical record from 1/21/22 was reviewed by me today. I reviewed the HPI, medications, allergies, reason for surgery, diagnosis and treatment plan and there has been no interval change. The patient was examined by me today.  Physical exam findings for this update include:    /73   Pulse 77   Temp 96.9 °F (36.1 °C) (Temporal)   Resp 16   Ht 5' 4\" (1.626 m)   Wt 222 lb 7.1 oz (100.9 kg)   LMP 03/04/2009   SpO2 98%   BMI 38.18 kg/m²    Airway is intact  Chest: breathing comfortably  Heart: regular rate  Findings on exam of the body region where surgery is to be performed include: see last office and/or consult note        Electronically signed by Balaji Willams MD on 1/28/2022 at 9:01 AM

## 2022-01-28 NOTE — BRIEF OP NOTE
Brief Postoperative Note      Patient: Alisha Cortes  YOB: 1965  MRN: [id number]    Date of Procedure: 1/28/2022    Pre-Op Diagnosis: LEFT SHOULDER IMPINGEMENT    Post-Op Diagnosis: Same, bursitis, grade 4 chondroamalcia humeral head, grade 3 glenoid       Procedure(s):  LEFT SHOULDER ARTHROSCOPY, SUBACROMIAL DECOMPRESSION, CHRONDOPLASTY    Surgeon(s):  Jaylan Armenta MD    Assistant:  Surgical Assistant: Chery Urbina    Anesthesia: General    Estimated Blood Loss (mL): Minimal    Complications: None    Specimens:   * No specimens in log *    Implants:  * No implants in log *      Drains: * No LDAs found *        Electronically signed by Jayaln Armenta MD on 1/28/2022 at 10:36 AM

## 2022-01-29 NOTE — OP NOTE
[address] [place]                                OPERATIVE REPORT    PATIENT NAME: Dillon Zaman                  :        1965  MED REC NO:   [id number]                          ROOM:  ACCOUNT NO:   [de-identified]                           ADMIT DATE: 2022  PROVIDER:     Reino Jeans, MD    DATE OF PROCEDURE:  2022    PREOPERATIVE DIAGNOSES:  Left shoulder impingement, long head of biceps  tendon rupture, possible SLAP tear, and rotator cuff tendinitis. 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 OPERATIONS PERFORMED:  Left shoulder arthroscopy, subacromial  decompression, chondroplasty of the humeral head and the glenoid. SURGEON:  Reino Jeans, MD    ASSISTANTS:  Osvaldo Duarte    ANESTHESIA:  General and nerve block. EBL:  Minimal.    COMPLICATIONS:  None. DISPOSITION:  To PACU in good condition. INDICATIONS:  The patient is a 64-year-old female who was seen in the  office for her left shoulder pain. She had had pain for several months  without any obvious history of injury. She had had nonoperative  treatment with physical therapy as well as corticosteroid injection  without relief. She had an MRI, which demonstrated impingement-related  changes in the posterior-superior humerus with overlying infraspinatus  tendinopathy and a ruptured intra-articular long head of biceps tendon. Given her continued pain despite nonoperative management, we recommended  surgical intervention. We discussed the risks, benefits, complications,  and alternatives to the procedure. She subsequently provided written  informed consent for the procedure. OPERATIVE PROCEDURE:  The patient was seen in the preoperative holding  area.   Her left shoulder was marked. She was seen by anesthesia  service. She received a preoperative nerve block. She was then brought  to the operating room. She was transferred onto the operating room  table in supine position. She was induced under general anesthesia. She received prophylactic preoperative IV antibiotics. She had DVT  prophylaxis with sequential compression devices on her bilateral lower  extremities. She was then placed in the lateral decubitus position with  her left side up. An axillary roll was placed. Care was taken to  ensure that all bony prominences were well padded. Her left shoulder  was then sterilely prepped and draped in the usual fashion. A time-out  was taken where the patient, the operative extremity, and the operative  procedure were once again verified. We then created a standard posterior arthroscopy portal.  The  arthroscope was inserted into the glenohumeral joint. An anterior  portal was then created under direct visualization using a spinal  needle. We then explored the glenohumeral joint, which did demonstrate  no evidence of the long head of the biceps tendon. She did have a type  II SLAP tear with fraying of the remnant of the superior labrum. She  did have some grade 1 to 2 chondromalacia of the anterior aspect of the  humeral head, not directly on the articular aspect with the glenoid. She did, however, have grade 4 chondromalacia at the inferior aspect of  the articular aspect of the humeral head with a kissing lesion of grade  2 chondromalacia of the glenoid at the inferior aspect of the glenoid. A chondroplasty was performed using our arthroscopic shaver. Her  subscapularis was completely intact and pristine without any evidence of  tendinitis or fraying anteriorly and the articular aspect of her  superior cuff was completely smooth without any partial tearing or  fraying. The scope was then removed the glenohumeral joint and placed in the  subacromial space.   A lateral portal was then created. She had a  significant amount of bursitis within the subacromial space as well as  the subdeltoid recess. A bursectomy was performed using the shaver. She had a moderate anterior acromial spur and an anterior acromioplasty  was performed using the bur. The bursal surface of the rotator cuff was  completely smooth without any evidence of partial tearing or fraying. The scope was then removed from the shoulder. The portal sites were  closed with 3-0 Prolene sutures in a simple interrupted fashion. Steri-Strips were applied. Betadine ointment, 2x2 gauze, and Tegaderm  dressing were then applied as well as a towel, cooling pad, and a simple  sling. The patient was then awoken from anesthesia, transferred onto  her hospital cart, and transported to the PACU for recovery. She  tolerated the procedure well and without any complications. PLAN:  The patient will be recovered in the PACU and then be discharged  home. She was given a prescription for Percocet as well as Phenergan  for nausea and vomiting. She was instructed to ice her shoulder with a  cooling pad as much as possible throughout the weekend with careful  instructions to make sure the cooling pad does not directly contact her  skin to avoid the potential for frostbite. She will follow up in the  office next week for a wound check as well as to review her arthroscopic  pictures with her and start her on physical therapy.         Dejan Trimble MD    D: 01/28/2022 10:55:38       T: 01/28/2022 12:16:23     [email or web address]  Job#: [id number]     Doc#: [id number]    CC:

## 2022-01-31 ENCOUNTER — OFFICE VISIT (OUTPATIENT)
Dept: ORTHOPEDIC SURGERY | Age: 57
End: 2022-01-31

## 2022-01-31 VITALS — WEIGHT: 225.4 LBS | HEIGHT: 64 IN | BODY MASS INDEX: 38.48 KG/M2

## 2022-01-31 DIAGNOSIS — M94.212 CHONDROMALACIA OF LEFT SHOULDER: ICD-10-CM

## 2022-01-31 DIAGNOSIS — M75.42 SHOULDER IMPINGEMENT SYNDROME, LEFT: Primary | ICD-10-CM

## 2022-01-31 PROCEDURE — 99024 POSTOP FOLLOW-UP VISIT: CPT | Performed by: STUDENT IN AN ORGANIZED HEALTH CARE EDUCATION/TRAINING PROGRAM

## 2022-01-31 NOTE — PROGRESS NOTES
Shoulder Arthroscopy Follow-up  Kirit Weller is here for follow up after left shoulder arthroscopic surgery. Surgery date was 1/28/22. 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 Pain is controlled with current analgesics. Medication(s) being used: narcotic analgesics including Percocet. The patient's pain is rated at 4/10. Her low back is causing her more pain after lying flat at night. The patient denies fever, wound drainage, increasing redness, pus, increasing swelling. Post op problems reported: none. She has been in a sling and non-weightbearing as instructed. Physical Examination:  Patient is awake, alert, and in no acute distress. The incisions are clean. There is no warmth, erythema, or purulent drainage over the incisions. Sensation is intact to light touch in the axillary, median, radial, and ulnar nerve distribution bilaterally. The EPL, FPL, and interossei are grossly intact bilaterally. The Bilateral upper extremities are warm and well-perfused with brisk capillary refill. Assessment:   4 days status post left shoulder arthroscopy, subacromial decompression, chondroplasty of the humeral head and the glenoid      Plan: We reviewed intra-operative arthroscopy photos. Start physical therapy. A physical therapy order was placed and postoperative physical therapy order was provided to the patient to give to the physical therapist.    The patient may advance their weight-bearing. Sling for comfort, discontinue as tolerated. Elbow ROM when out of sling. The patient should use the cold pad or apply ice to the shoulder continuously for 3 days after surgery. Then use cold pad or ice 20-30 minutes only 3-5 times per day as needed. Refill pain medications as needed. Patient may start showering now. No baths or soaks.   Can leave open to air or apply band-aids to the incisions once out of Tegaderm. No driving while on pain medication if it causes impairment. Do not recommend driving while shoulder is in sling as this may impair ability to control vehicle. Return to office at the 2 week postop time period for suture removal and evaluation of progress. [facility]    Disclaimer: This note was generated with use of a verbal recognition program (DRAGON) and an attempt was made to check for errors. It is possible that there are still dictated errors within this office note. If so, please bring any significant errors to my attention for an addendum. All efforts were made to ensure that this office note is accurate.

## 2022-02-11 ENCOUNTER — OFFICE VISIT (OUTPATIENT)
Dept: ORTHOPEDIC SURGERY | Age: 57
End: 2022-02-11

## 2022-02-11 VITALS — WEIGHT: 220 LBS | HEIGHT: 64 IN | BODY MASS INDEX: 37.56 KG/M2

## 2022-02-11 DIAGNOSIS — Z98.890 S/P ARTHROSCOPY OF LEFT SHOULDER: Primary | ICD-10-CM

## 2022-02-11 DIAGNOSIS — M75.42 SHOULDER IMPINGEMENT SYNDROME, LEFT: ICD-10-CM

## 2022-02-11 DIAGNOSIS — M75.82 ROTATOR CUFF TENDINITIS, LEFT: ICD-10-CM

## 2022-02-11 PROCEDURE — 1040RET RETAIN PT ID & RECRUITMENT: Performed by: STUDENT IN AN ORGANIZED HEALTH CARE EDUCATION/TRAINING PROGRAM

## 2022-02-11 PROCEDURE — 99024 POSTOP FOLLOW-UP VISIT: CPT | Performed by: STUDENT IN AN ORGANIZED HEALTH CARE EDUCATION/TRAINING PROGRAM

## 2022-02-11 RX ORDER — OXYCODONE HYDROCHLORIDE AND ACETAMINOPHEN 5; 325 MG/1; MG/1
1 TABLET ORAL EVERY 4 HOURS PRN
Qty: 40 TABLET | Refills: 0 | Status: SHIPPED | OUTPATIENT
Start: 2022-02-11 | End: 2022-02-18

## 2022-02-11 NOTE — PROGRESS NOTES
RETAIN NEW PATIENT NOTE    Completed today:   [x]  Initial RETAIN consultation. [x]  Activity Prescription-if any restrictions for work. []  Contact Employer (by phone or email) regarding available accommodations and/or return to work options as required. [x] Discuss plan of care with coordinator by phone or [facility] in-basket message. [x] Established patient's return to work goal which is: return to work. Patient education regarding the importance of:   [x] staying active to avoid deconditioning. [x] returning to work as soon as possible. Work is good for [employer]. Long periods of absence from work are harmful. [x] the patient's currently recommended abilities, activity modifications and workplace modifications. [x] pain, although a part of healing from an injury, does not necessarily mean you are causing harm. [x] patient's roles and responsibilities including close follow up and compliance with recommendations. [] Schedule a follow-up in two weeks.

## 2022-02-11 NOTE — PROGRESS NOTES
Shoulder Arthroscopy Follow-up  Esther Ruff is here for follow up after left shoulder arthroscopic surgery. Surgery date was 1/28/22. 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 The patient had her first physical therapy appointment on Wednesday and was experiencing worsening pain that night and the next morning. Since the PT, she has had trouble sleeping and is requesting something stronger than advil to help with pain while sleeping. The patient's pain is rated at 9/10. The patient denies fever, wound drainage, increasing redness, pus, increasing swelling. Post op problems reported: none. She has been in physical therapy and is no longer using the sling. Physical Examination:  Patient is awake, alert, and in no acute distress. The incisions are clean, dry, healing. There is no warmth, erythema, or purulent drainage over the incisions. Sensation is intact to light touch in the axillary, median, radial, and ulnar nerve distribution bilaterally. The EPL, FPL, and interossei are grossly intact bilaterally. The Bilateral upper extremities are warm and well-perfused with brisk capillary refill. Assessment:   2 weeks status post left shoulder arthroscopy, subacromial decompression, chondroplasty of the humeral head and the glenoid      Plan:   Sutures were removed. Steri-strips and mastisol were applied. Patient may start taking baths or soaks at 4 weeks postop    Continue physical therapy. The patient may advance their weight-bearing as tolerated. 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 I will refill pain medication for the night pain the patient is experiencing. Wean off of pain medication with NSAIDs or Tylenol. Return to office at the 6 week postop time period for evaluation of progress or prn if problems.                   Pancho Grier [facility]  [facility]    Disclaimer: This note was generated with use of a verbal recognition program (DRAGON) and an attempt was made to check for errors. It is possible that there are still dictated errors within this office note. If so, please bring any significant errors to my attention for an addendum. All efforts were made to ensure that this office note is accurate.

## 2022-02-25 ENCOUNTER — TELEPHONE (OUTPATIENT)
Dept: ORTHOPEDIC SURGERY | Age: 57
End: 2022-02-25

## 2022-02-25 DIAGNOSIS — Z98.890 S/P ARTHROSCOPY OF LEFT SHOULDER: Primary | ICD-10-CM

## 2022-02-25 RX ORDER — HYDROCODONE BITARTRATE AND ACETAMINOPHEN 5; 325 MG/1; MG/1
1 TABLET ORAL EVERY 6 HOURS PRN
Qty: 28 TABLET | Refills: 0 | Status: SHIPPED | OUTPATIENT
Start: 2022-02-25 | End: 2022-03-04

## 2022-02-25 NOTE — TELEPHONE ENCOUNTER
The Patient called and is having pain in the L shoulder she mentioned it to PT they advised she ask if she should continue PT since she is in so much pain.

## 2022-02-25 NOTE — TELEPHONE ENCOUNTER
Spoke with the patient. Pain medicaiton will be sent to the pt pharmacy. Her PT would like to place her back into the sling for a short period. Marcia Rock agrees with this care plan.  Marcia Rock has advised the patient to continue with PT.

## 2022-03-28 ENCOUNTER — OFFICE VISIT (OUTPATIENT)
Dept: ORTHOPEDIC SURGERY | Age: 57
End: 2022-03-28

## 2022-03-28 VITALS — BODY MASS INDEX: 38.06 KG/M2 | WEIGHT: 222.9 LBS | HEIGHT: 64 IN

## 2022-03-28 DIAGNOSIS — Z98.890 S/P ARTHROSCOPY OF LEFT SHOULDER: Primary | ICD-10-CM

## 2022-03-28 PROCEDURE — 99024 POSTOP FOLLOW-UP VISIT: CPT | Performed by: STUDENT IN AN ORGANIZED HEALTH CARE EDUCATION/TRAINING PROGRAM

## 2022-03-28 NOTE — PROGRESS NOTES
Shoulder Arthroscopy Follow-up  Diallo Salas is here for follow up after left shoulder arthroscopic surgery. Surgery date was 1/28/22. 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 The patient went to a visit of physical therapy however her  lost his job so she was not able to continue. She did continue the physical therapy at home and states she has been improving. The patient's pain is rated at 0/10. The patient denies fever, wound drainage, increasing redness, pus, increasing swelling. Post op problems reported: none. Physical Examination:  Patient is awake, alert, and in no acute distress. The incisions are healed. There is no warmth, erythema, or purulent drainage over the incisions. Sensation is intact to light touch in the axillary, median, radial, and ulnar nerve distribution bilaterally. The EPL, FPL, and interossei are grossly intact bilaterally. The Bilateral upper extremities are warm and well-perfused with brisk capillary refill. Active ROM: Left forward flexion 165, external rotation 60, internal rotation L1. Right shoulder: forward flexion 165, external rotation 85, internal rotation L1. Passive ROM: Left forward flexion 180   Right shoulder: forward flexion 180    Strength: Left: isolated supraspinatus 5/5, ER 5-/5, IT 5/5  Right: isolated supraspinatus 5/5, ER 5/5, IT 5/5      Assessment:   2 months status post left shoulder arthroscopy, subacromial decompression, chondroplasty of the humeral head and the glenoid      Plan:     Continue physical therapy at home. She has exercises from her PT to do at [employer] as well. The patient's  lost his job so she had to switch insurances. Due to the large co-pay, she will follow up as needed as she is progressing very well with range of motion and strength.              Richard Hernández PA-C  [facility]    Disclaimer: This note was generated with use of a verbal recognition program (DRAGON) and an attempt was made to check for errors. It is possible that there are still dictated errors within this office note. If so, please bring any significant errors to my attention for an addendum. All efforts were made to ensure that this office note is accurate.

## 2022-04-18 ENCOUNTER — HOSPITAL ENCOUNTER (OUTPATIENT)
Dept: WOMENS IMAGING | Age: 57
Discharge: HOME | End: 2022-04-18
Payer: COMMERCIAL

## 2022-04-18 DIAGNOSIS — N95.8 ARTIFICIAL MENOPAUSE STATE: ICD-10-CM

## 2022-04-18 PROCEDURE — 77080 DXA BONE DENSITY AXIAL: CPT

## 2022-05-07 DIAGNOSIS — E03.9 ACQUIRED HYPOTHYROIDISM: ICD-10-CM

## 2022-05-09 RX ORDER — LEVOTHYROXINE SODIUM 0.1 MG/1
TABLET ORAL
Qty: 90 TABLET | Refills: 1 | Status: SHIPPED | OUTPATIENT
Start: 2022-05-09 | End: 2022-10-31

## 2022-05-09 NOTE — TELEPHONE ENCOUNTER
LOV 1/21/2022  Future Appointments   Date Time Provider Valeria Reeder   5/27/2022  7:45 AM DO Aaron Bustamante [facility]

## 2022-05-22 DIAGNOSIS — R10.13 DYSPEPSIA: ICD-10-CM

## 2022-05-23 RX ORDER — LISINOPRIL 10 MG/1
TABLET ORAL
Qty: 90 TABLET | Refills: 1 | Status: SHIPPED | OUTPATIENT
Start: 2022-05-23 | End: 2022-10-31

## 2022-05-23 RX ORDER — OMEPRAZOLE 40 MG/1
CAPSULE, DELAYED RELEASE ORAL
Qty: 90 CAPSULE | Refills: 1 | Status: SHIPPED | OUTPATIENT
Start: 2022-05-23 | End: 2022-10-31

## 2022-05-23 NOTE — TELEPHONE ENCOUNTER
Last office visit 1/21/2022     Last written      Next office visit scheduled 5/27/2022    Requested Prescriptions     Pending Prescriptions Disp Refills    lisinopril (PRINIVIL;ZESTRIL) 10 MG tablet [Pharmacy Med Name: LISINOPRIL 10 MG TABLET] 90 tablet 1     Sig: TAKE 1 TABLET BY MOUTH EVERY DAY    omeprazole (PRILOSEC) 40 MG delayed release capsule [Pharmacy Med Name: OMEPRAZOLE DR 40 MG CAPSULE] 90 capsule 1     Sig: TAKE 1 CAPSULE BY MOUTH EVERY DAY

## 2022-05-31 ENCOUNTER — OFFICE VISIT (OUTPATIENT)
Dept: FAMILY MEDICINE CLINIC | Age: 57
End: 2022-05-31
Payer: COMMERCIAL

## 2022-05-31 VITALS
WEIGHT: 218.6 LBS | BODY MASS INDEX: 37.52 KG/M2 | DIASTOLIC BLOOD PRESSURE: 68 MMHG | SYSTOLIC BLOOD PRESSURE: 120 MMHG | OXYGEN SATURATION: 97 % | HEART RATE: 67 BPM

## 2022-05-31 DIAGNOSIS — G31.84 MCI (MILD COGNITIVE IMPAIRMENT) WITH MEMORY LOSS: ICD-10-CM

## 2022-05-31 DIAGNOSIS — F33.1 MODERATE EPISODE OF RECURRENT MAJOR DEPRESSIVE DISORDER (HCC): ICD-10-CM

## 2022-05-31 DIAGNOSIS — E53.8 B12 DEFICIENCY: ICD-10-CM

## 2022-05-31 DIAGNOSIS — I10 ESSENTIAL HYPERTENSION: Primary | ICD-10-CM

## 2022-05-31 LAB
A/G RATIO: 2.4 (ref 1.1–2.2)
ALBUMIN SERPL-MCNC: 4.7 G/DL (ref 3.4–5)
ALP BLD-CCNC: 85 U/L (ref 40–129)
ALT SERPL-CCNC: 12 U/L (ref 10–40)
ANION GAP SERPL CALCULATED.3IONS-SCNC: 14 MMOL/L (ref 3–16)
AST SERPL-CCNC: 14 U/L (ref 15–37)
BILIRUB SERPL-MCNC: 0.6 MG/DL (ref 0–1)
BUN BLDV-MCNC: 11 MG/DL (ref 7–20)
CALCIUM SERPL-MCNC: 9.4 MG/DL (ref 8.3–10.6)
CHLORIDE BLD-SCNC: 101 MMOL/L (ref 99–110)
CO2: 24 MMOL/L (ref 21–32)
CREAT SERPL-MCNC: 0.6 MG/DL (ref 0.6–1.1)
FOLATE: 10.65 NG/ML (ref 4.78–24.2)
GFR AFRICAN AMERICAN: >60
GFR NON-AFRICAN AMERICAN: >60
GLUCOSE BLD-MCNC: 100 MG/DL (ref 70–99)
HCT VFR BLD CALC: 43.4 % (ref 36–48)
HEMOGLOBIN: 14.6 G/DL (ref 12–16)
MCH RBC QN AUTO: 31.2 PG (ref 26–34)
MCHC RBC AUTO-ENTMCNC: 33.7 G/DL (ref 31–36)
MCV RBC AUTO: 92.7 FL (ref 80–100)
PDW BLD-RTO: 13.1 % (ref 12.4–15.4)
PLATELET # BLD: 248 K/UL (ref 135–450)
PMV BLD AUTO: 8.3 FL (ref 5–10.5)
POTASSIUM SERPL-SCNC: 4.5 MMOL/L (ref 3.5–5.1)
RBC # BLD: 4.69 M/UL (ref 4–5.2)
SODIUM BLD-SCNC: 139 MMOL/L (ref 136–145)
TOTAL PROTEIN: 6.7 G/DL (ref 6.4–8.2)
TSH REFLEX FT4: 1.59 UIU/ML (ref 0.27–4.2)
VITAMIN B-12: 234 PG/ML (ref 211–911)
WBC # BLD: 5.6 K/UL (ref 4–11)

## 2022-05-31 PROCEDURE — 36415 COLL VENOUS BLD VENIPUNCTURE: CPT | Performed by: FAMILY MEDICINE

## 2022-05-31 PROCEDURE — 99214 OFFICE O/P EST MOD 30 MIN: CPT | Performed by: FAMILY MEDICINE

## 2022-05-31 NOTE — PROGRESS NOTES
2022     Myriam Fu (:  1965) is a 60 y.o. female, here for evaluation of the following medical concerns:    HPI     Patient presented today for follow up on chronic medical conditions. HTN- patient's 155/94, 146/86, headache, stress, recently  started the mediation abd is feeling better. She stated that her numbers are much improved, no side effects, has been on the medication in the past, denied headache, vision change, or dizziness.      Anxiety/depression- has been on Paxil 30 mg for  Years, having increased stress, panic, nervousness, stated that the medication has worked well, no side effects, would like to increase if possible. She has had B12 injections in the past.     GERD- not taking her ppi at this time, noticing burning and reflux symptoms, has stopped all NSAIDS but not improving.       Cervical cancer- following with GYN and Oncologist, cancer free for 3 years, yearly appointments only at this time. Memory lose? Questionable but she acknowledges that she is forgetting names etc... 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      Patient denied chest pain, sob, n, v, or diarrhea. Review of Systems   Constitutional: Positive for fatigue. Negative for activity change, fever and unexpected weight change. HENT: Negative for congestion, facial swelling, mouth sores, sinus pressure, sore throat and trouble swallowing. Eyes: Negative for visual disturbance. Respiratory: Negative for cough and shortness of breath. Cardiovascular: Negative for chest pain and leg swelling. Gastrointestinal: Negative for abdominal pain, diarrhea, nausea and vomiting. Endocrine: Negative for cold intolerance, heat intolerance, polydipsia and polyphagia. Musculoskeletal: Positive for arthralgias. Skin: Negative for rash.    Neurological: Negative for dizziness, numbness and headaches. Psychiatric/Behavioral: Negative for dysphoric mood. The patient is not nervous/anxious. Prior to Visit Medications    Medication Sig Taking? Authorizing Provider   lisinopril (PRINIVIL;ZESTRIL) 10 MG tablet TAKE 1 TABLET BY MOUTH EVERY DAY Yes Soy Phillips DO   omeprazole (PRILOSEC) 40 MG delayed release capsule TAKE 1 CAPSULE BY MOUTH EVERY DAY Yes Soy Phillips DO   levothyroxine (SYNTHROID) 100 MCG tablet TAKE 1 TABLET BY MOUTH EVERY DAY Yes Soy Phillips DO   PARoxetine (PAXIL) 30 MG tablet TAKE 1 TABLET BY MOUTH EVERY DAY Yes Soy Phillips DO   tiZANidine (ZANAFLEX) 4 MG tablet Take 1 tablet by mouth 4 times daily as needed (spasm) Yes RHONA Davis   calcium carbonate (OSCAL) 500 MG TABS tablet Take 500 mg by mouth daily Yes Historical Provider, MD   magnesium 30 MG tablet Take 30 mg by mouth 2 times daily Yes Historical Provider, MD   Multiple Vitamins-Minerals (CENTRUM SILVER PO) Take by mouth Yes Historical Provider, MD   rosuvastatin (CRESTOR) 20 MG tablet TAKE 1 TABLET BY MOUTH EVERY DAY  Lianne Hahn DO        Social History     Tobacco Use    Smoking status: Former Smoker     Packs/day: 0.50     Years: 10.00     Pack years: 5.00     Types: Cigarettes     Quit date:      Years since quittin.4    Smokeless tobacco: Never Used   Substance Use Topics    Alcohol use: Never     Comment: Occ        Vitals:    22 0910   BP: 120/68   Pulse: 67   SpO2: 97%   Weight: 218 lb 9.6 oz (99.2 kg)     Estimated body mass index is 37.52 kg/m² as calculated from the following:    Height as of 3/28/22: 5' 4\" (1.626 m). Weight as of this encounter: 218 lb 9.6 oz (99.2 kg). Physical Exam  Vitals and nursing note reviewed. Constitutional:       Appearance: She is well-developed. HENT:      Head: Normocephalic and atraumatic.       Right Ear: Tympanic membrane, ear canal and external ear normal.      Left Ear: Tympanic membrane, ear canal and external ear normal.      Nose: Nose normal.   Eyes:      Conjunctiva/sclera: Conjunctivae normal.      Pupils: Pupils are equal, round, and reactive to light. Cardiovascular:      Rate and Rhythm: Normal rate and regular rhythm. Heart sounds: Normal heart sounds. Pulmonary:      Effort: Pulmonary effort is normal.      Breath sounds: Normal breath sounds. Abdominal:      General: Bowel sounds are normal.      Palpations: Abdomen is soft. Tenderness: There is no abdominal tenderness. Musculoskeletal:         General: Normal range of motion. Skin:     General: Skin is warm and dry. Neurological:      Mental Status: She is alert and oriented to person, place, and time. Psychiatric:         Behavior: Behavior normal.         ASSESSMENT/PLAN:  1. Essential hypertension   controlled. Discussed signs and symptoms for immediate evaluation in the ER. Reduce sodium. Monitor diet, exercise and lose weight. Keep a BP log and bring it to your next appointment. Needs to rtc in one month for BP check  - CBC  - Comprehensive Metabolic Panel  - TSH with Reflex to FT4  - Vitamin B12 & Folate    2. Moderate episode of recurrent major depressive disorder ([facility] [address]  Labs obtained  Medication provided  Discussed side effects of medication  Discussed signs and symptoms for immediate evaluation in ER. Answered questions. - CBC  - Comprehensive Metabolic Panel  - TSH with Reflex to FT4  - Vitamin B12 & Folate    3. MCI (mild cognitive impairment) with memory loss  Reassurance   Discuss signs and symptoms  - CBC  - Comprehensive Metabolic Panel  - TSH with Reflex to FT4  - Vitamin B12 & Folate    4. B12 deficiency  Will obtain labs today  - CBC  - Comprehensive Metabolic Panel  - TSH with Reflex to FT4  - Vitamin B12 & Folate      Return in about 6 months (around 11/30/2022).

## 2022-06-02 RX ORDER — ROSUVASTATIN CALCIUM 20 MG/1
TABLET, COATED ORAL
Qty: 90 TABLET | Refills: 1 | Status: SHIPPED | OUTPATIENT
Start: 2022-06-02

## 2022-06-02 NOTE — TELEPHONE ENCOUNTER
Last office visit 5/31/2022     Last written     Next office visit scheduled Visit date not found    Requested Prescriptions     Pending Prescriptions Disp Refills    rosuvastatin (CRESTOR) 20 MG tablet [Pharmacy Med Name: ROSUVASTATIN CALCIUM 20 MG TAB] 90 tablet 1     Sig: TAKE 1 TABLET BY MOUTH EVERY DAY

## 2022-06-06 ASSESSMENT — ENCOUNTER SYMPTOMS
FACIAL SWELLING: 0
VOMITING: 0
SINUS PRESSURE: 0
DIARRHEA: 0
SHORTNESS OF BREATH: 0
COUGH: 0
NAUSEA: 0
ABDOMINAL PAIN: 0
SORE THROAT: 0
TROUBLE SWALLOWING: 0

## 2022-06-21 ENCOUNTER — OFFICE VISIT (OUTPATIENT)
Dept: ORTHOPEDIC SURGERY | Age: 57
End: 2022-06-21

## 2022-06-21 VITALS — RESPIRATION RATE: 16 BRPM | WEIGHT: 217.6 LBS | BODY MASS INDEX: 37.15 KG/M2 | HEIGHT: 64 IN

## 2022-06-21 DIAGNOSIS — M25.512 ACUTE PAIN OF LEFT SHOULDER: Primary | ICD-10-CM

## 2022-06-21 DIAGNOSIS — S43.422A SPRAIN OF LEFT ROTATOR CUFF CAPSULE, INITIAL ENCOUNTER: ICD-10-CM

## 2022-06-21 PROCEDURE — 99213 OFFICE O/P EST LOW 20 MIN: CPT | Performed by: ORTHOPAEDIC SURGERY

## 2022-06-21 NOTE — PROGRESS NOTES
History:  Lorna Tavares is here for left shoulder. She had left shoulder arthroscopic surgery on 1/28/22. 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 Her shoulder had been doing very well until 4 days ago, when she fell. She landed on her knee initially and then brace her fall with the left arm. Pain is located laterally. Symptoms are worse with overhead activity. She does note some numbness and tingling in her fourth and fifth fingers      Physical Examination:    Resp 16   Ht 5' 4\" (1.626 m)   Wt 217 lb 9.6 oz (98.7 kg)   LMP 03/04/2009   BMI 37.35 kg/m²  Patient is awake, alert, and in no acute distress. Sensation is intact to light touch in the axillary, median, radial, and ulnar nerve distribution bilaterally. The EPL, FPL, and interossei are grossly intact bilaterally. The Bilateral upper extremities are warm and well-perfused with brisk capillary refill. Left shoulder active forward flexion 160-170, passive 180, ER 60, IR L4   Bilateral 5/5 Supraspinatus, External rotation     Left shoulder x-rays: 3 view x-rays of the shoulder including AP, scapular Y, and axillary views obtained and reviewed. No fracture or dislocation. Mild inferior humeral head osteophyte. AC joint arthritis        Assessment:   1. Left shoulder rotator cuff sprain  2  5 months status post left shoulder arthroscopy, subacromial decompression, chondroplasty of the humeral head and the glenoid      Plan:   Discussed with patient I suspect she may have a left shoulder rotator cuff sprain. Ice as needed.     NSAIDs as needed    We discussed slowly resuming her home exercise program.  She can begin with gentle pendulum exercises for the next couple days and then begin working on range of motion    If symptoms fail to improve over the next 4-5 weeks, she will give us a call and we will obtain MRI of her left shoulder. Eleazar Menjivar. Cresencio Potts MD  Orthopaedic Surgery and Sports Medicine     Disclaimer: This note was generated with use of a verbal recognition program (DRAGON) and an attempt was made to check for errors. It is possible that there are still dictated errors within this office note. If so, please bring any significant errors to my attention for an addendum. All efforts were made to ensure that this office note is accurate.

## 2022-08-14 DIAGNOSIS — M75.82 ROTATOR CUFF TENDINITIS, LEFT: ICD-10-CM

## 2022-08-14 DIAGNOSIS — M75.42 SHOULDER IMPINGEMENT SYNDROME, LEFT: ICD-10-CM

## 2022-08-15 RX ORDER — MELOXICAM 7.5 MG/1
TABLET ORAL
Qty: 30 TABLET | Refills: 0 | OUTPATIENT
Start: 2022-08-15

## 2022-10-29 DIAGNOSIS — R10.13 DYSPEPSIA: ICD-10-CM

## 2022-10-29 DIAGNOSIS — E03.9 ACQUIRED HYPOTHYROIDISM: ICD-10-CM

## 2022-10-31 RX ORDER — LISINOPRIL 10 MG/1
TABLET ORAL
Qty: 90 TABLET | Refills: 1 | Status: SHIPPED | OUTPATIENT
Start: 2022-10-31

## 2022-10-31 RX ORDER — PAROXETINE 30 MG/1
TABLET, FILM COATED ORAL
Qty: 90 TABLET | Refills: 1 | Status: SHIPPED | OUTPATIENT
Start: 2022-10-31

## 2022-10-31 RX ORDER — OMEPRAZOLE 40 MG/1
CAPSULE, DELAYED RELEASE ORAL
Qty: 90 CAPSULE | Refills: 1 | Status: SHIPPED | OUTPATIENT
Start: 2022-10-31

## 2022-10-31 RX ORDER — LEVOTHYROXINE SODIUM 0.1 MG/1
TABLET ORAL
Qty: 90 TABLET | Refills: 1 | Status: SHIPPED | OUTPATIENT
Start: 2022-10-31

## 2022-10-31 NOTE — TELEPHONE ENCOUNTER
Last office visit 5/31/2022     Last written      Next office visit scheduled 11/30/2022    Requested Prescriptions     Pending Prescriptions Disp Refills    lisinopril (PRINIVIL;ZESTRIL) 10 MG tablet [Pharmacy Med Name: LISINOPRIL 10 MG TABLET] 90 tablet 1     Sig: TAKE 1 TABLET BY MOUTH EVERY DAY    omeprazole (PRILOSEC) 40 MG delayed release capsule [Pharmacy Med Name: OMEPRAZOLE DR 40 MG CAPSULE] 90 capsule 1     Sig: TAKE 1 CAPSULE BY MOUTH EVERY DAY    PARoxetine (PAXIL) 30 MG tablet [Pharmacy Med Name: PAROXETINE HCL 30 MG TABLET] 90 tablet 1     Sig: TAKE 1 TABLET BY MOUTH EVERY DAY    levothyroxine (SYNTHROID) 100 MCG tablet [Pharmacy Med Name: LEVOTHYROXINE 100 MCG TABLET] 90 tablet 1     Sig: TAKE 1 TABLET BY MOUTH EVERY DAY

## 2023-01-05 ENCOUNTER — OFFICE VISIT (OUTPATIENT)
Dept: ORTHOPEDIC SURGERY | Age: 58
End: 2023-01-05
Payer: COMMERCIAL

## 2023-01-05 ENCOUNTER — TELEPHONE (OUTPATIENT)
Dept: ORTHOPEDIC SURGERY | Age: 58
End: 2023-01-05

## 2023-01-05 VITALS — BODY MASS INDEX: 35.17 KG/M2 | HEIGHT: 64 IN | WEIGHT: 206 LBS | RESPIRATION RATE: 16 BRPM

## 2023-01-05 DIAGNOSIS — M25.512 ACUTE PAIN OF LEFT SHOULDER: Primary | ICD-10-CM

## 2023-01-05 PROCEDURE — 99214 OFFICE O/P EST MOD 30 MIN: CPT | Performed by: STUDENT IN AN ORGANIZED HEALTH CARE EDUCATION/TRAINING PROGRAM

## 2023-01-05 RX ORDER — METHYLPREDNISOLONE 4 MG/1
TABLET ORAL
Qty: 1 KIT | Refills: 0 | Status: SHIPPED | OUTPATIENT
Start: 2023-01-05 | End: 2023-01-11

## 2023-01-05 NOTE — PROGRESS NOTES
CHIEF COMPLAINT: Left shoulder pain    DATE OF INJURY: 1/1/23    History:    Mo Perez is a 59 y.o. right handed female self-referred for evaluation and treatment of Left shoulder pain. This is evaluated as a personal injury. The pain began 4 days ago. Pain is rated as a 5-10/10. She had a left shoulder arthroscopy, subacromial decompression, chondroplasty of the humeral head and the glenoid on 1/28/22. She re-injured it in June of 2022 and Dr. Migel Farrar suspected a rotator cuff strain. This resolved until this new injury. She was picking up a 50 lb box and felt a sharp pain deep, anterior and lateral in the shoulder. She is complaining of referred pain to the deltoid which she describes as burning. She is unable to fasten her bra due to pain. There is pain with overhead activity. The patient has had PT after her surgery and has done home exercises. The patient has not had an injection. The patient has tried NSAIDs. The patient has tried ice. Outside reports reviewed:  Post op/Dr. Maurice's notes.     Past Medical History:   Diagnosis Date    Anxiety     Cancer ([facility] [address] 03/29/2018    Cervical    Hypertension     Hyperthyroidism        Past Surgical History:   Procedure Laterality Date    COLONOSCOPY      HYSTERECTOMY (CERVIX STATUS UNKNOWN)  05/14/2018    Robotic-assisted Total Laparoscopic Hysterectomy with Bilateral Salpingo-oophorectomy    SHOULDER ARTHROSCOPY Left 1/28/2022    LEFT SHOULDER ARTHROSCOPY, SUBACROMIAL DECOMPRESSION, CHRONDOPLASTY performed by Shari Morales MD at [facility]         Current Outpatient Medications on File Prior to Visit   Medication Sig Dispense Refill    lisinopril (PRINIVIL;ZESTRIL) 10 MG tablet TAKE 1 TABLET BY MOUTH EVERY DAY 90 tablet 1    omeprazole (PRILOSEC) 40 MG delayed release capsule TAKE 1 CAPSULE BY MOUTH EVERY DAY 90 capsule 1    PARoxetine (PAXIL) 30 MG tablet TAKE 1 TABLET BY MOUTH EVERY DAY 90 tablet 1    levothyroxine (SYNTHROID) 100 MCG tablet TAKE 1 TABLET BY MOUTH EVERY DAY 90 tablet 1    rosuvastatin (CRESTOR) 20 MG tablet TAKE 1 TABLET BY MOUTH EVERY DAY 90 tablet 1    tiZANidine (ZANAFLEX) 4 MG tablet Take 1 tablet by mouth 4 times daily as needed (spasm) 60 tablet 0    calcium carbonate (OSCAL) 500 MG TABS tablet Take 500 mg by mouth daily      magnesium 30 MG tablet Take 30 mg by mouth 2 times daily      Multiple Vitamins-Minerals (CENTRUM SILVER PO) Take by mouth       No current facility-administered medications on file prior to visit. Allergies   Allergen Reactions    Egg Shells      Other reaction(s): GI Upset  Pt states that eating eggs (yolk) makes her sick \"all day long\" where she has to lay down. States is able to take the flu shot. Social History     Socioeconomic History    Marital status:      Spouse name: Not on file    Number of children: Not on file    Years of education: Not on file    Highest education level: Not on file   Occupational History    Not on file   Tobacco Use    Smoking status: Former     Packs/day: 0.50     Years: 10.00     Pack years: 5.00     Types: Cigarettes     Quit date:      Years since quittin.0    Smokeless tobacco: Never   Vaping Use    Vaping Use: Never used   Substance and Sexual Activity    Alcohol use: Never     Comment:  Occ    Drug use: No    Sexual activity: Yes   Other Topics Concern    Not on file   Social History Narrative    Not on file     Social Determinants of Health     Financial Resource Strain: Not on file   Food Insecurity: Not on file   Transportation Needs: Not on file   Physical Activity: Not on file   Stress: Not on file   Social Connections: Not on file   Intimate Partner Violence: Not on file   Housing Stability: Not on file       Family History   Problem Relation Age of Onset    Stroke Father        Review of Systems:   I have reviewed the clinically relevant past medical history, medications, allergies, family history, social history, and 13 point Review of Systems from the patient's recent history form & documented any details relevant to today's presenting complaints in the history above. The patient's self-reported past medical history, medications, allergies, family history, social history, and Review of Systems form from 1/5/23 have been scanned into the chart under the \"Media\" tab. Physical Examination:      Vital signs:    Vitals:    01/05/23 1058   Resp: 16        General:   alert, appears stated age, cooperative, and no distress   Left Shoulder   Active ROM:   forward flexion 135, external rotation 45, internal rotation back pocket. Right shoulder: forward flexion 180, external rotation 85, internal rotation T7. Passive ROM:  forward flexion 180   Right shoulder: forward flexion 180   Joint Tenderness:   biceps tendon, lateral acromial   Neer:   positive   Parkinson:   positive   Strength:   5-/5 supraspinatus, 5/5 ER and IR    Right shoulder: 5/5 supraspinatus, IR and ER    Drop-arm test:   negative   Belly-press test:   positive   Bear-hug test:   positive   Speed's test:   negative   Bicipital groove tenderness:  positive   Calle's test:   Positive - lateral acromial    Cross-body adduction test:   negative    AC joint tenderness:   negative   Scapular dyskinesis:   absent  Right shoulder: absent   Atrophy:   none noted     Right shoulder: none noted      There are no skin lesions, cellulitis, or extreme edema in the upper extremities. Sensation is grossly intact to light touch bilaterally upper extremity. The patient has warm and well-perfused bilateral upper extremities with brisk capillary refill. Imaging   Left Shoulder X-Ray from 1/6/22 reviewed:   [facility] Joint: narrowing moderate  Glenohumeral joint: no abnormalities noted  Elevation humeral head: absent    Left Shoulder MRI [facility] 5/25/21:   1.  Impingement related change in the posterosuperior humerus with overlying infraspinatus    coalescent tendinopathy. 2. Ruptured intraarticular biceps long head         Assessment:      Left shoulder concern for traumatic rotator cuff tear       Plan:      Natural history and expected course discussed. Questions answered. We discussed that due to the injury, history of surgery in this shoulder and provocative testing of her rotator cuff, we will order an MRI. MRI left shoulder to evaluate for rotator cuff tear. We discussed an injection at her follow up visit if the MRI does not reveal a tear. I do not recommend an injection prior to the MRI in case she does have a tear that requires surgery. Medrol dose pack sent to pharmacy. No NSAID's while taking the steroids. Ice as needed. Follow up after MRI. Diallo Ocampo PA-C  Board Certified by the [employer] on Certification of [address] and [address]: This note was generated with use of a verbal recognition program and an attempt was made to check for errors. It is possible that there are still dictated errors within this office note. If so, please bring any significant errors to my attention for an addendum. All efforts were made to ensure that this office note is accurate.

## 2023-01-05 NOTE — TELEPHONE ENCOUNTER
Order, authorization and demographics faxed to [facility]. Spoke with patient. She will contact [facility] tomorrow, if she does not hear from them, to schedule. Follow up in office approximately three days after for results and treatment options.

## 2023-01-16 ENCOUNTER — OFFICE VISIT (OUTPATIENT)
Dept: ORTHOPEDIC SURGERY | Age: 58
End: 2023-01-16
Payer: COMMERCIAL

## 2023-01-16 VITALS — BODY MASS INDEX: 35.3 KG/M2 | HEIGHT: 64 IN | WEIGHT: 206.8 LBS | RESPIRATION RATE: 16 BRPM

## 2023-01-16 DIAGNOSIS — R20.2 PARESTHESIA OF LEFT ARM: Primary | ICD-10-CM

## 2023-01-16 PROCEDURE — 99213 OFFICE O/P EST LOW 20 MIN: CPT | Performed by: ORTHOPAEDIC SURGERY

## 2023-01-16 RX ORDER — GABAPENTIN 300 MG/1
300 CAPSULE ORAL 3 TIMES DAILY
Qty: 27 CAPSULE | Refills: 0 | Status: SHIPPED | OUTPATIENT
Start: 2023-01-16 | End: 2023-01-25

## 2023-01-16 NOTE — PROGRESS NOTES
CHIEF COMPLAINT: Left shoulder pain    DATE OF INJURY: 1/1/23    History:    Ruthann Avalos is a 59 y.o. right handed female here for left shoulder follow-up. Initial history: self-referred for evaluation and treatment of Left shoulder pain. This is evaluated as a personal injury. The pain began 4 days ago. Pain is rated as a 5-10/10. She had a left shoulder arthroscopy, subacromial decompression, chondroplasty of the humeral head and the glenoid on 1/28/22. She re-injured it in June of 2022 and Dr. Radhika Reyez suspected a rotator cuff strain. This resolved until this new injury. She was picking up a 50 lb box and felt a sharp pain deep, anterior and lateral in the shoulder. She is complaining of referred pain to the deltoid which she describes as burning. She is unable to fasten her bra due to pain. There is pain with overhead activity. The patient has had PT after her surgery and has done home exercises. The patient has not had an injection. The patient has tried NSAIDs. The patient has tried ice. Interval History:  Her pain has fluctuated. She rates pain 6/10. She complains of burning pain along her bicep. She does note numbness and tingling in her fourth and fifth fingers, that has been present for the last 4 weeks. She does note some pain that radiates down her arm. She does have some neck pain. She notes pain along her scapula.         Past Medical History:   Diagnosis Date    Anxiety     Cancer ([address] 03/29/2018    Cervical    Hypertension     Hyperthyroidism        Past Surgical History:   Procedure Laterality Date    COLONOSCOPY      HYSTERECTOMY (CERVIX STATUS UNKNOWN)  05/14/2018    Robotic-assisted Total Laparoscopic Hysterectomy with Bilateral Salpingo-oophorectomy    SHOULDER ARTHROSCOPY Left 1/28/2022    LEFT SHOULDER ARTHROSCOPY, SUBACROMIAL DECOMPRESSION, CHRONDOPLASTY performed by Nils Norris MD at [address] [place]         Current Outpatient Medications on File Prior to Visit   Medication Sig Dispense Refill    lisinopril (PRINIVIL;ZESTRIL) 10 MG tablet TAKE 1 TABLET BY MOUTH EVERY DAY 90 tablet 1    omeprazole (PRILOSEC) 40 MG delayed release capsule TAKE 1 CAPSULE BY MOUTH EVERY DAY 90 capsule 1    PARoxetine (PAXIL) 30 MG tablet TAKE 1 TABLET BY MOUTH EVERY DAY 90 tablet 1    levothyroxine (SYNTHROID) 100 MCG tablet TAKE 1 TABLET BY MOUTH EVERY DAY 90 tablet 1    rosuvastatin (CRESTOR) 20 MG tablet TAKE 1 TABLET BY MOUTH EVERY DAY 90 tablet 1    tiZANidine (ZANAFLEX) 4 MG tablet Take 1 tablet by mouth 4 times daily as needed (spasm) 60 tablet 0    calcium carbonate (OSCAL) 500 MG TABS tablet Take 500 mg by mouth daily      magnesium 30 MG tablet Take 30 mg by mouth 2 times daily      Multiple Vitamins-Minerals (CENTRUM SILVER PO) Take by mouth       No current facility-administered medications on file prior to visit. Allergies   Allergen Reactions    Egg Shells      Other reaction(s): GI Upset  Pt states that eating eggs (yolk) makes her sick \"all day long\" where she has to lay down. States is able to take the flu shot. Social History     Socioeconomic History    Marital status:      Spouse name: Not on file    Number of children: Not on file    Years of education: Not on file    Highest education level: Not on file   Occupational History    Not on file   Tobacco Use    Smoking status: Former     Packs/day: 0.50     Years: 10.00     Pack years: 5.00     Types: Cigarettes     Quit date:      Years since quittin.0    Smokeless tobacco: Never   Vaping Use    Vaping Use: Never used   Substance and Sexual Activity    Alcohol use: Never     Comment:  Occ    Drug use: No    Sexual activity: Yes   Other Topics Concern    Not on file   Social History Narrative    Not on file     Social Determinants of Health     Financial Resource Strain: Not on file   Food Insecurity: Not on file   Transportation Needs: Not on file   Physical Activity: Not on file   Stress: Not on file   Social Connections: Not on file   Intimate Partner Violence: Not on file   Housing Stability: Not on file       Family History   Problem Relation Age of Onset    Stroke Father          Physical Examination:      Vital signs:  Resp 16   Ht 5' 4\" (1.626 m)   Wt 206 lb 12.8 oz (93.8 kg)   LMP 03/04/2009   BMI 35.50 kg/m²     General:   alert, appears stated age, cooperative, and no distress   Left Shoulder   Active ROM:   forward flexion 135, external rotation 45, internal rotation back pocket. Right shoulder: forward flexion 180, external rotation 85, internal rotation T7. Passive ROM:  forward flexion 180   Right shoulder: forward flexion 180   Joint Tenderness:   biceps tendon, lateral acromial   Neer:   positive   Parkinson:   positive   Strength:   5/5 supraspinatus, 5/5 ER and IR    Right shoulder: 5/5 supraspinatus, IR and ER          Imaging   Left Shoulder X-Ray from 1/6/22   [facility] Joint: narrowing moderate  Glenohumeral joint: no abnormalities noted  Elevation humeral head: absent    Left Shoulder MRI [facility] 5/25/21:   1. Impingement related change in the posterosuperior humerus with overlying infraspinatus    coalescent tendinopathy. 2. Ruptured intraarticular biceps long head     Left shoulder MRI:  1. Chronic complete, full-thickness tear of the long head biceps tendon intra-articular    segment, with free end retraction inferior to the level of the surgical neck; similar in    comparison to prior study. 2. Mild tendinopathy and peritendinitis of the supraspinatus and infraspinatus tendons;    relatively stable from prior study. No full-thickness or retracted rotator cuff tear. 3. Mild glenohumeral and AC joint arthrosis, both unchanged from prior study. No acute AC joint    injury or separation fracture, or glenohumeral dislocation injury.    4. Interval acromioplasty, with thinning and marginal remodeling of the peripheral acromion,    and chronic subcortical pitting and cystic erosions of the lateral and posterolateral humeral    head. Assessment:      Left arm paresthesias  Left shoulder rotator cuff tendinopathy      Plan:      Natural history and expected course discussed. Questions answered. We reviewed her MRI images and discussed the findings. There is no acute abnormality on the MRI. I am not sure that her shoulder is the etiology of all her symptoms. She does have numbness and tingling of fourth and fifth fingers. She does complain of burning sensation down her biceps. She does note neck pain. Prescription for gabapentin e-scribed. We discussed weaning up to 3 times a day and weaning off. Will monitor her symptoms over the next 2-4 weeks. If she continues to have neurologic symptoms, will obtain EMG. Eleazar Menjivar. Cresencio Potts MD  Orthopaedic Surgery and Sports Medicine     Disclaimer: This note was generated with use of a verbal recognition program and an attempt was made to check for errors. It is possible that there are still dictated errors within this office note. If so, please bring any significant errors to my attention for an addendum. All efforts were made to ensure that this office note is accurate.

## 2023-02-24 ENCOUNTER — TELEPHONE (OUTPATIENT)
Dept: FAMILY MEDICINE CLINIC | Age: 58
End: 2023-02-24

## 2023-02-24 NOTE — LETTER
[facility]  [address] [facility],First Floor [address]  Dept: [phone]  Loc: [address] [place] [address],   2/24/2023      [address] [place] [address] [address] [place] [address]          Dear Ashley Niño,    [facility] records show that you are in need of a screening mammogram. The American Cancer Society's guidelines state that early detection of breast cancer improves the chances that breast cancer can be diagnosed at an early stage and treated successfully. Women age 36 and older should have a mammogram every year and should continue to do so for as long as they are in good health. To schedule a screening mammogram, you do not need an order from your doctor. You can call [address] [place] [address] [phone]) to schedule a mammogram at your earliest convenience. If you already had a mammogram in the last 12 months, congratulations for taking this step toward good health! Please call our office to inform us of the date and location of your (most recent) mammogram. We also welcome you to reach out to us online using [facility]. Ask us how!     Sincerely,    Birdie Fink, DO

## 2023-03-01 ENCOUNTER — E-VISIT (OUTPATIENT)
Dept: FAMILY MEDICINE CLINIC | Age: 58
End: 2023-03-01
Payer: COMMERCIAL

## 2023-03-01 DIAGNOSIS — J40 BRONCHITIS: Primary | ICD-10-CM

## 2023-03-01 PROCEDURE — 99442 PR PHYS/QHP TELEPHONE EVALUATION 11-20 MIN: CPT | Performed by: FAMILY MEDICINE

## 2023-03-01 RX ORDER — AZITHROMYCIN 250 MG/1
250 TABLET, FILM COATED ORAL SEE ADMIN INSTRUCTIONS
Qty: 6 TABLET | Refills: 0 | Status: SHIPPED | OUTPATIENT
Start: 2023-03-01 | End: 2023-03-06

## 2023-03-01 RX ORDER — GUAIFENESIN AND PSEUDOEPHEDRINE HCL 1200; 120 MG/1; MG/1
1 TABLET, EXTENDED RELEASE ORAL 2 TIMES DAILY PRN
Qty: 20 TABLET | Refills: 0 | Status: SHIPPED | OUTPATIENT
Start: 2023-03-01 | End: 2023-03-11

## 2023-03-01 ASSESSMENT — LIFESTYLE VARIABLES
PACKS_PER_DAY: 1
SMOKING_STATUS: NO, I'M A FORMER SMOKER
SMOKING_YEARS: 5

## 2023-03-01 NOTE — PROGRESS NOTES
Yunier Castorena (:  1965) is a 59 y.o. female,Established patient, here for evaluation of the following chief complaint(s):  No chief complaint on file. Bronchitis     ASSESSMENT/PLAN:  Bronchitis  Antibiotic provided for 5 days  Push fluids and rest  Use Tylenol/Ibuprofen for pain  Please rtc if not improved. Subjective   SUBJECTIVE/OBJECTIVE:  HPI  Nasal congestion  Coughing  Mucous  Only two days? No fever    Review of Systems  Ear pain    Objective   Physical Exam  NO PE    On this date 3/1/2023 I have spent 11 minutes reviewing previous notes, test results and face to face with the patient discussing the diagnosis and importance of compliance with the treatment plan as well as documenting on the day of the visit. An electronic signature was used to authenticate this note.     --Lucrecia Ng DO

## 2023-03-02 LAB — MAMMOGRAPHY, EXTERNAL: NORMAL

## 2023-04-26 ENCOUNTER — TELEPHONE (OUTPATIENT)
Dept: ORTHOPEDIC SURGERY | Age: 58
End: 2023-04-26

## 2023-04-26 DIAGNOSIS — R20.2 PARESTHESIA: Primary | ICD-10-CM

## 2023-04-26 NOTE — TELEPHONE ENCOUNTER
S/W [facility]   She states her parasthesia sx are bilateral at this time and sx are progressively increasing. Provided phone number to call to schedule EMG. She states she has tried OTC NSAIDs, Tylenol, topicals and Lidocaine patches. Discussed regimen and possible home TENS unit application which is available OTC - discussed proper use of this. Advised since there has been no office visit in >90 days, no Rx could be provided at this time. She was advised to schedule an appointment with Dr. Harpal Edmondson once her EMG is scheduled. Patient voiced understanding of the conversation and will contact the office with further questions or concerns.

## 2023-04-26 NOTE — TELEPHONE ENCOUNTER
Other PATIENT IS CALLING REQUESTING A CALL BACK. SHE IS EXPERIENCING PAIN IN SHOULDER AND NECK GOING INTO HER BACK. SHE SPOKE WITH DR Keerthi Young REGARDING AN EMG TEST WOULD LIKE TO SEE IF SHE CAN GET THAT ORDER PUT IN. ALSO WOULD LIKE TO KNOW WHAT SHE CAN DO FOR THE PAIN.  Tello  [phone]

## 2023-05-02 DIAGNOSIS — E03.9 ACQUIRED HYPOTHYROIDISM: ICD-10-CM

## 2023-05-02 RX ORDER — LISINOPRIL 10 MG/1
TABLET ORAL
Qty: 90 TABLET | Refills: 1 | Status: SHIPPED | OUTPATIENT
Start: 2023-05-02

## 2023-05-02 RX ORDER — LEVOTHYROXINE SODIUM 0.1 MG/1
TABLET ORAL
Qty: 90 TABLET | Refills: 1 | Status: SHIPPED | OUTPATIENT
Start: 2023-05-02

## 2023-05-02 NOTE — TELEPHONE ENCOUNTER
Last office visit 3/1/2023     Last written      Next office visit scheduled Visit date not found    Requested Prescriptions     Pending Prescriptions Disp Refills    lisinopril (PRINIVIL;ZESTRIL) 10 MG tablet [Pharmacy Med Name: LISINOPRIL 10 MG TABLET] 90 tablet 1     Sig: TAKE 1 TABLET BY MOUTH EVERY DAY    levothyroxine (SYNTHROID) 100 MCG tablet [Pharmacy Med Name: LEVOTHYROXINE 100 MCG TABLET] 90 tablet 1     Sig: TAKE 1 TABLET BY MOUTH EVERY DAY

## 2023-05-04 ENCOUNTER — PROCEDURE VISIT (OUTPATIENT)
Dept: NEUROLOGY | Age: 58
End: 2023-05-04
Payer: COMMERCIAL

## 2023-05-04 DIAGNOSIS — G56.23 ULNAR NEUROPATHY OF BOTH UPPER EXTREMITIES: Primary | ICD-10-CM

## 2023-05-04 PROCEDURE — 95911 NRV CNDJ TEST 9-10 STUDIES: CPT | Performed by: PSYCHIATRY & NEUROLOGY

## 2023-05-04 PROCEDURE — 95886 MUSC TEST DONE W/N TEST COMP: CPT | Performed by: PSYCHIATRY & NEUROLOGY

## 2023-05-04 NOTE — PROGRESS NOTES
Alejandro Bullock M.D. [facility] [place] [place]) Physicians/[facility]  Board Certified in [address] [facility] [address] [facility], [address]    EMG / NERVE CONDUCTION STUDY      PATIENT:  Gabriella Manzo       DATE OF EM23     YOB: 1965       REASON FOR EMG:   Bilateral arm pain and numbness      REFERRING PHYSICIAN:  Daniel Navarro MD  [address] [facility]. [facility]     SUMMARY:   Bilateral median motor and sensory nerve studies were normal  Bilateral ulnar motor nerve studies had slowing of conduction velocities across the elbow. 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 The left side is more involved when compared to the right side. ---------------------------------------------  Alejandro Bullock M.D.   Electromyographer / Neurologist

## 2023-05-08 ENCOUNTER — OFFICE VISIT (OUTPATIENT)
Dept: ORTHOPEDIC SURGERY | Age: 58
End: 2023-05-08

## 2023-05-08 VITALS — BODY MASS INDEX: 34.31 KG/M2 | WEIGHT: 201 LBS | HEIGHT: 64 IN | RESPIRATION RATE: 16 BRPM

## 2023-05-08 DIAGNOSIS — R20.2 PARESTHESIA OF LEFT ARM: ICD-10-CM

## 2023-05-08 DIAGNOSIS — M54.9 BACK PAIN, UNSPECIFIED BACK LOCATION, UNSPECIFIED BACK PAIN LATERALITY, UNSPECIFIED CHRONICITY: ICD-10-CM

## 2023-05-08 DIAGNOSIS — G56.22 CUBITAL TUNNEL SYNDROME ON LEFT: Primary | ICD-10-CM

## 2023-05-08 RX ORDER — GABAPENTIN 300 MG/1
300 CAPSULE ORAL 3 TIMES DAILY
Qty: 27 CAPSULE | Refills: 1 | Status: SHIPPED | OUTPATIENT
Start: 2023-05-08 | End: 2023-05-17

## 2023-05-08 NOTE — PROGRESS NOTES
CHIEF COMPLAINT: Left shoulder pain    DATE OF INJURY: 1/1/23    History:    Raman Patton is a 59 y.o. right handed female here for left shoulder follow-up. Initial history: self-referred for evaluation and treatment of Left shoulder pain. This is evaluated as a personal injury. The pain began 4 days ago. Pain is rated as a 5-10/10. She had a left shoulder arthroscopy, subacromial decompression, chondroplasty of the humeral head and the glenoid on 1/28/22. She re-injured it in June of 2022 and Dr. Callahan suspected a rotator cuff strain. This resolved until this new injury. She was picking up a 50 lb box and felt a sharp pain deep, anterior and lateral in the shoulder. She is complaining of referred pain to the deltoid which she describes as burning. She is unable to fasten her bra due to pain. There is pain with overhead activity. The patient has had PT after her surgery and has done home exercises. The patient has not had an injection. The patient has tried NSAIDs. The patient has tried ice. Interval History: She notes continued pain in her left arm. She has numbness and tingling in her bilateral upper extremities, left worse than right. Numbness and tingling in the fourth and fifth fingers. She states that she does try to sleep with her elbows extended but does wake up at times with it in a flexed position. She notes pain in her midline cervical spine that extends down into her thoracic spine. She states that she does work sitting for extended periods of time and does have to force herself to get up and move.         Past Medical History:   Diagnosis Date    Anxiety     Cancer ([facility] [address] 03/29/2018    Cervical    Hypertension     Hyperthyroidism        Past Surgical History:   Procedure Laterality Date    COLONOSCOPY      HYSTERECTOMY (CERVIX STATUS UNKNOWN)  05/14/2018    Robotic-assisted Total Laparoscopic Hysterectomy with Bilateral Salpingo-oophorectomy    SHOULDER ARTHROSCOPY Left 1/28/2022

## 2023-05-09 DIAGNOSIS — R10.13 DYSPEPSIA: ICD-10-CM

## 2023-05-09 RX ORDER — OMEPRAZOLE 40 MG/1
CAPSULE, DELAYED RELEASE ORAL
Qty: 90 CAPSULE | Refills: 1 | Status: SHIPPED | OUTPATIENT
Start: 2023-05-09

## 2023-05-30 ENCOUNTER — HOSPITAL ENCOUNTER (OUTPATIENT)
Dept: PHYSICAL THERAPY | Age: 58
Setting detail: THERAPIES SERIES
Discharge: HOME | End: 2023-05-30

## 2023-05-30 NOTE — PROGRESS NOTES
[address]     Physical Therapy  Cancellation/No-show Note  Patient Name:  Emily Sands  :  1965   Date:  2023  Cancelled visits to date: 0  No-shows to date: 1    Patient status for today's appointment patient:  []  Cancelled  []  Rescheduled appointment  [x]  No-show      Reason given by patient:  []  Patient ill  []  Conflicting appointment  []  No transportation    []  Conflict with work  [x]  No reason given  []  Other:     Comments:      Phone call information:   []  Phone call made today to patient at _ time at number provided:      []  Patient answered, conversation as follows:    []  Patient did not answer, message left as follows:  [x]  Phone call not made today  []  Phone call not needed - pt contacted us to cancel and provided reason for cancellation. Electronically signed by:   LYNDON Puri ATC

## 2023-08-18 ENCOUNTER — OFFICE VISIT (OUTPATIENT)
Dept: FAMILY MEDICINE CLINIC | Age: 58
End: 2023-08-18
Payer: COMMERCIAL

## 2023-08-18 VITALS
HEIGHT: 64 IN | DIASTOLIC BLOOD PRESSURE: 88 MMHG | SYSTOLIC BLOOD PRESSURE: 128 MMHG | WEIGHT: 199 LBS | BODY MASS INDEX: 33.97 KG/M2

## 2023-08-18 DIAGNOSIS — E03.9 ACQUIRED HYPOTHYROIDISM: ICD-10-CM

## 2023-08-18 DIAGNOSIS — R20.2 PARESTHESIA OF LEFT ARM: ICD-10-CM

## 2023-08-18 DIAGNOSIS — Z00.00 EXAMINATION, MEDICAL, GENERAL: Primary | ICD-10-CM

## 2023-08-18 DIAGNOSIS — R10.13 DYSPEPSIA: ICD-10-CM

## 2023-08-18 LAB
BASOPHILS # BLD: 0.1 K/UL (ref 0–0.2)
BASOPHILS NFR BLD: 0.9 %
DEPRECATED RDW RBC AUTO: 13.9 % (ref 12.4–15.4)
EOSINOPHIL # BLD: 0.2 K/UL (ref 0–0.6)
EOSINOPHIL NFR BLD: 3.5 %
HCT VFR BLD AUTO: 42.2 % (ref 36–48)
HGB BLD-MCNC: 14.6 G/DL (ref 12–16)
LYMPHOCYTES # BLD: 2.1 K/UL (ref 1–5.1)
LYMPHOCYTES NFR BLD: 33.2 %
MCH RBC QN AUTO: 32.6 PG (ref 26–34)
MCHC RBC AUTO-ENTMCNC: 34.7 G/DL (ref 31–36)
MCV RBC AUTO: 94.1 FL (ref 80–100)
MONOCYTES # BLD: 0.3 K/UL (ref 0–1.3)
MONOCYTES NFR BLD: 5.6 %
NEUTROPHILS # BLD: 3.5 K/UL (ref 1.7–7.7)
NEUTROPHILS NFR BLD: 56.8 %
PLATELET # BLD AUTO: 349 K/UL (ref 135–450)
PMV BLD AUTO: 8.1 FL (ref 5–10.5)
RBC # BLD AUTO: 4.48 M/UL (ref 4–5.2)
WBC # BLD AUTO: 6.2 K/UL (ref 4–11)

## 2023-08-18 PROCEDURE — 3074F SYST BP LT 130 MM HG: CPT | Performed by: FAMILY MEDICINE

## 2023-08-18 PROCEDURE — 3078F DIAST BP <80 MM HG: CPT | Performed by: FAMILY MEDICINE

## 2023-08-18 PROCEDURE — 99396 PREV VISIT EST AGE 40-64: CPT | Performed by: FAMILY MEDICINE

## 2023-08-18 PROCEDURE — 36415 COLL VENOUS BLD VENIPUNCTURE: CPT | Performed by: FAMILY MEDICINE

## 2023-08-18 RX ORDER — TIZANIDINE 4 MG/1
4 TABLET ORAL 4 TIMES DAILY PRN
Qty: 60 TABLET | Refills: 0 | Status: SHIPPED | OUTPATIENT
Start: 2023-08-18

## 2023-08-18 RX ORDER — GABAPENTIN 300 MG/1
300 CAPSULE ORAL 3 TIMES DAILY
Qty: 270 CAPSULE | Refills: 2 | Status: SHIPPED | OUTPATIENT
Start: 2023-08-18 | End: 2024-05-14

## 2023-08-18 RX ORDER — LISINOPRIL 10 MG/1
10 TABLET ORAL DAILY
Qty: 90 TABLET | Refills: 1 | Status: SHIPPED | OUTPATIENT
Start: 2023-08-18

## 2023-08-18 RX ORDER — LEVOTHYROXINE SODIUM 0.1 MG/1
100 TABLET ORAL DAILY
Qty: 90 TABLET | Refills: 1 | Status: SHIPPED | OUTPATIENT
Start: 2023-08-18

## 2023-08-18 RX ORDER — OMEPRAZOLE 40 MG/1
40 CAPSULE, DELAYED RELEASE ORAL DAILY
Qty: 90 CAPSULE | Refills: 1 | Status: SHIPPED | OUTPATIENT
Start: 2023-08-18

## 2023-08-18 RX ORDER — PAROXETINE 30 MG/1
30 TABLET, FILM COATED ORAL DAILY
Qty: 90 TABLET | Refills: 1 | Status: SHIPPED | OUTPATIENT
Start: 2023-08-18

## 2023-08-18 RX ORDER — ROSUVASTATIN CALCIUM 20 MG/1
20 TABLET, COATED ORAL DAILY
Qty: 90 TABLET | Refills: 1 | Status: SHIPPED | OUTPATIENT
Start: 2023-08-18

## 2023-08-18 ASSESSMENT — PATIENT HEALTH QUESTIONNAIRE - PHQ9
9. THOUGHTS THAT YOU WOULD BE BETTER OFF DEAD, OR OF HURTING YOURSELF: 0
SUM OF ALL RESPONSES TO PHQ QUESTIONS 1-9: 0
10. IF YOU CHECKED OFF ANY PROBLEMS, HOW DIFFICULT HAVE THESE PROBLEMS MADE IT FOR YOU TO DO YOUR WORK, TAKE CARE OF THINGS AT HOME, OR GET ALONG WITH OTHER PEOPLE: 0
5. POOR APPETITE OR OVEREATING: 0
6. FEELING BAD ABOUT YOURSELF - OR THAT YOU ARE A FAILURE OR HAVE LET YOURSELF OR YOUR FAMILY DOWN: 0
SUM OF ALL RESPONSES TO PHQ QUESTIONS 1-9: 0
SUM OF ALL RESPONSES TO PHQ QUESTIONS 1-9: 0
2. FEELING DOWN, DEPRESSED OR HOPELESS: 0
4. FEELING TIRED OR HAVING LITTLE ENERGY: 0
8. MOVING OR SPEAKING SO SLOWLY THAT OTHER PEOPLE COULD HAVE NOTICED. OR THE OPPOSITE, BEING SO FIGETY OR RESTLESS THAT YOU HAVE BEEN MOVING AROUND A LOT MORE THAN USUAL: 0
SUM OF ALL RESPONSES TO PHQ QUESTIONS 1-9: 0
7. TROUBLE CONCENTRATING ON THINGS, SUCH AS READING THE NEWSPAPER OR WATCHING TELEVISION: 0
3. TROUBLE FALLING OR STAYING ASLEEP: 0
SUM OF ALL RESPONSES TO PHQ9 QUESTIONS 1 & 2: 0
1. LITTLE INTEREST OR PLEASURE IN DOING THINGS: 0

## 2023-08-18 ASSESSMENT — ENCOUNTER SYMPTOMS
SINUS PRESSURE: 0
TROUBLE SWALLOWING: 0
ABDOMINAL PAIN: 0
WHEEZING: 0
DIARRHEA: 0
COUGH: 0
NAUSEA: 0
CHEST TIGHTNESS: 0
RHINORRHEA: 0
SORE THROAT: 0
VOMITING: 0
FACIAL SWELLING: 0
SHORTNESS OF BREATH: 0

## 2023-08-18 NOTE — PROGRESS NOTES
2023     Robbie Mustafa (:  1965) is a 59 y.o. female, here for evaluation of the following medical concerns:       Adult visit  discussed vaccinations  -discussed HIV testing and basic labs  -discuseds healthy eating habits and exercise and answered questions  -discussed age appropriate screening recommendations       HTN- much improved after recent readings of  155/94, 146/86, headache, stress, recently  started the mediation abd is feeling better. She stated that her numbers are much improved, no side effects, has been on the medication in the past, denied headache, vision change, or dizziness. Anxiety/depression- has been on Paxil 30 mg for  Years, having increased stress, panic, nervousness, stated that the medication has worked well, no side effects, would like to increase if possible. She has had B12 injections in the past.     GERD- not taking her ppi at this time, noticing burning and reflux symptoms, has stopped all NSAIDS but not improving. Cervical cancer- following with GYN and Oncologist, cancer free for 3 years, yearly appointments only at this time. 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 The left side is more involved when compared to the right side. Follows with ortho    Patient denied chest pain, sob, n, v, or diarrhea. Review of Systems   Constitutional:  Negative for activity change, fatigue, fever and unexpected weight change. HENT:  Negative for congestion, ear pain, facial swelling, mouth sores, rhinorrhea, sinus pressure, sore throat and trouble swallowing. Eyes:  Negative for visual disturbance. Respiratory:  Negative for cough, chest tightness, shortness of breath and wheezing. Cardiovascular:  Negative for chest pain and leg swelling. Gastrointestinal:  Negative for abdominal pain, diarrhea, nausea and vomiting.    Musculoskeletal:

## 2023-08-19 LAB
ALBUMIN SERPL-MCNC: 4.9 G/DL (ref 3.4–5)
ALBUMIN/GLOB SERPL: 2 {RATIO} (ref 1.1–2.2)
ALP SERPL-CCNC: 90 U/L (ref 40–129)
ALT SERPL-CCNC: 17 U/L (ref 10–40)
ANION GAP SERPL CALCULATED.3IONS-SCNC: 16 MMOL/L (ref 3–16)
AST SERPL-CCNC: 15 U/L (ref 15–37)
BILIRUB SERPL-MCNC: 0.5 MG/DL (ref 0–1)
BUN SERPL-MCNC: 12 MG/DL (ref 7–20)
CALCIUM SERPL-MCNC: 9.9 MG/DL (ref 8.3–10.6)
CHLORIDE SERPL-SCNC: 100 MMOL/L (ref 99–110)
CHOLEST SERPL-MCNC: 387 MG/DL (ref 0–199)
CO2 SERPL-SCNC: 25 MMOL/L (ref 21–32)
CREAT SERPL-MCNC: 0.7 MG/DL (ref 0.6–1.1)
FOLATE SERPL-MCNC: >20 NG/ML (ref 4.78–24.2)
GFR SERPLBLD CREATININE-BSD FMLA CKD-EPI: >60 ML/MIN/{1.73_M2}
GLUCOSE SERPL-MCNC: 82 MG/DL (ref 70–99)
HDLC SERPL-MCNC: 58 MG/DL (ref 40–60)
LDLC SERPL CALC-MCNC: 270 MG/DL
POTASSIUM SERPL-SCNC: 4.1 MMOL/L (ref 3.5–5.1)
PROT SERPL-MCNC: 7.3 G/DL (ref 6.4–8.2)
SODIUM SERPL-SCNC: 141 MMOL/L (ref 136–145)
TRIGL SERPL-MCNC: 293 MG/DL (ref 0–150)
TSH SERPL DL<=0.005 MIU/L-ACNC: 3 UIU/ML (ref 0.27–4.2)
VIT B12 SERPL-MCNC: 581 PG/ML (ref 211–911)
VLDLC SERPL CALC-MCNC: 59 MG/DL

## 2023-08-22 ENCOUNTER — TELEPHONE (OUTPATIENT)
Dept: FAMILY MEDICINE CLINIC | Age: 58
End: 2023-08-22

## 2023-08-22 NOTE — TELEPHONE ENCOUNTER
Pt called back and was advised of her results.  She wants to know if a new rx for the Rovustatin 20 mg twice a day will be sent to [address] [facility] [phone]

## 2023-08-24 RX ORDER — ROSUVASTATIN CALCIUM 40 MG/1
40 TABLET, COATED ORAL DAILY
Qty: 90 TABLET | Refills: 1 | Status: SHIPPED | OUTPATIENT
Start: 2023-08-24

## 2023-09-05 RX ORDER — TIZANIDINE 4 MG/1
4 TABLET ORAL 4 TIMES DAILY PRN
Qty: 60 TABLET | Refills: 0 | Status: SHIPPED | OUTPATIENT
Start: 2023-09-05

## 2023-09-05 NOTE — TELEPHONE ENCOUNTER
Last office visit 8/18/2023       Next office visit scheduled Visit date not found    Requested Prescriptions     Pending Prescriptions Disp Refills    tiZANidine (Jenise Garvin) 4 MG tablet [Pharmacy Med Name: TIZANIDINE HCL 4 MG TABLET] 60 tablet 0     Sig: Take 1 tablet by mouth 4 times daily as needed (spasm)

## 2023-09-13 RX ORDER — TIZANIDINE 4 MG/1
4 TABLET ORAL 4 TIMES DAILY PRN
Qty: 60 TABLET | Refills: 0 | Status: SHIPPED | OUTPATIENT
Start: 2023-09-13

## 2023-09-13 NOTE — TELEPHONE ENCOUNTER
Last office visit 8/18/2023       Next office visit scheduled Visit date not found    Requested Prescriptions     Pending Prescriptions Disp Refills    tiZANidine (Amy Cervantes) 4 MG tablet [Pharmacy Med Name: TIZANIDINE HCL 4 MG TABLET] 60 tablet 0     Sig: TAKE 1 TABLET BY MOUTH 4 TIMES DAILY AS NEEDED (SPASM)

## 2023-09-19 ENCOUNTER — TELEPHONE (OUTPATIENT)
Dept: ORTHOPEDIC SURGERY | Age: 58
End: 2023-09-19

## 2023-09-19 NOTE — TELEPHONE ENCOUNTER
Patient is scheduled for this Thurs for neck pain, I see that she has been seeing him for her shoulder and wasn't not sure if this could be due to the shoulder. Do they need to be moved or can they stay on the schedule?

## 2023-09-20 ENCOUNTER — OFFICE VISIT (OUTPATIENT)
Dept: ORTHOPEDIC SURGERY | Age: 58
End: 2023-09-20
Payer: COMMERCIAL

## 2023-09-20 VITALS — HEIGHT: 64 IN | BODY MASS INDEX: 34.16 KG/M2

## 2023-09-20 DIAGNOSIS — M54.12 CERVICAL RADICULOPATHY: ICD-10-CM

## 2023-09-20 DIAGNOSIS — M50.30 DDD (DEGENERATIVE DISC DISEASE), CERVICAL: ICD-10-CM

## 2023-09-20 DIAGNOSIS — M54.2 NECK PAIN: Primary | ICD-10-CM

## 2023-09-20 PROCEDURE — 99214 OFFICE O/P EST MOD 30 MIN: CPT | Performed by: PHYSICIAN ASSISTANT

## 2023-09-20 RX ORDER — PREDNISONE 20 MG/1
20 TABLET ORAL DAILY
Qty: 10 TABLET | Refills: 0 | Status: SHIPPED | OUTPATIENT
Start: 2023-09-20 | End: 2023-09-30

## 2023-09-20 SDOH — HEALTH STABILITY: PHYSICAL HEALTH: ON AVERAGE, HOW MANY DAYS PER WEEK DO YOU ENGAGE IN MODERATE TO STRENUOUS EXERCISE (LIKE A BRISK WALK)?: 1 DAY

## 2023-09-20 SDOH — HEALTH STABILITY: PHYSICAL HEALTH: ON AVERAGE, HOW MANY MINUTES DO YOU ENGAGE IN EXERCISE AT THIS LEVEL?: 20 MIN

## 2023-09-20 ASSESSMENT — SOCIAL DETERMINANTS OF HEALTH (SDOH)
WITHIN THE LAST YEAR, HAVE TO BEEN RAPED OR FORCED TO HAVE ANY KIND OF SEXUAL ACTIVITY BY YOUR PARTNER OR EX-PARTNER?: NO
WITHIN THE LAST YEAR, HAVE YOU BEEN HUMILIATED OR EMOTIONALLY ABUSED IN OTHER WAYS BY YOUR PARTNER OR EX-PARTNER?: NO
WITHIN THE LAST YEAR, HAVE YOU BEEN KICKED, HIT, SLAPPED, OR OTHERWISE PHYSICALLY HURT BY YOUR PARTNER OR EX-PARTNER?: NO
WITHIN THE LAST YEAR, HAVE YOU BEEN AFRAID OF YOUR PARTNER OR EX-PARTNER?: NO

## 2023-09-20 NOTE — PROGRESS NOTES
New Patient: CERVICAL SPINE    Referring Provider:  No ref. provider found    CHIEF COMPLAINT:    Chief Complaint   Patient presents with    Neck Pain       HISTORY OF PRESENT ILLNESS:   Ms. Andrew Jameson is a pleasant 59 y.o. old female here for consultation regarding her neck and right > left arm pain. She states the pain began gradually several months ago without injury. Her pain has steadily worsened since receiving trigger point injections with holistic medications at the chiropractic office about 2 weeks ago. She rates her neck pain 10/10 VAS and shoulder and arm pain 7/10 VAS. She describes the pain as sharp/ burning. Pain is worst with activity and improved some with right arm held above her head. The arm pain radiates to her fingers. She reports numbness and tingling in the right and left upper extremity. She reports perceived weakness of her right and left arm. Patient denies difficulty with fine motor skills. She denies lower extremity symptoms, gait abnormality, saddle numbness and bowel or bladder dysfunction. The pain does interfere with her sleep. 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 Left side is more involved when compared to the right side. Current/Past Treatment:   Physical Therapy: No  Chiropractic:  Yes. Began around 8/1/23 and attended 2 x week until last Wednesday 9/13/23. Injection:  trigger point injection x 1 with some type of holistic ingredients. Medications: Gabapentin 300 mg 3 times daily, tizanidine 4 mg every 6 hours, occasional Advil and Tylenol without relief.     Past Medical History:   Past Medical History:   Diagnosis Date    Anxiety     Cancer ([address]) 03/29/2018    Cervical    Hypertension     Hyperthyroidism         Past Surgical History:     Past Surgical History:   Procedure Laterality Date    COLONOSCOPY      HYSTERECTOMY (CERVIX STATUS UNKNOWN)  05/14/2018

## 2023-09-27 RX ORDER — TIZANIDINE 4 MG/1
4 TABLET ORAL 4 TIMES DAILY PRN
Qty: 60 TABLET | Refills: 0 | Status: SHIPPED | OUTPATIENT
Start: 2023-09-27

## 2023-09-27 NOTE — TELEPHONE ENCOUNTER
Last office visit 8/18/2023       Next office visit scheduled Visit date not found    Requested Prescriptions     Pending Prescriptions Disp Refills    tiZANidine (Jet Taylor) 4 MG tablet [Pharmacy Med Name: TIZANIDINE HCL 4 MG TABLET] 60 tablet 0     Sig: TAKE 1 TABLET BY MOUTH 4 TIMES DAILY AS NEEDED (SPASM)

## 2023-09-28 ENCOUNTER — E-VISIT (OUTPATIENT)
Dept: FAMILY MEDICINE CLINIC | Age: 58
End: 2023-09-28
Payer: COMMERCIAL

## 2023-09-28 DIAGNOSIS — J01.90 ACUTE BACTERIAL SINUSITIS: Primary | ICD-10-CM

## 2023-09-28 DIAGNOSIS — B96.89 ACUTE BACTERIAL SINUSITIS: Primary | ICD-10-CM

## 2023-09-28 PROCEDURE — 99422 OL DIG E/M SVC 11-20 MIN: CPT | Performed by: FAMILY MEDICINE

## 2023-09-28 RX ORDER — AMOXICILLIN 875 MG/1
875 TABLET, COATED ORAL 2 TIMES DAILY
Qty: 20 TABLET | Refills: 0 | Status: SHIPPED | OUTPATIENT
Start: 2023-09-28 | End: 2023-10-08

## 2023-09-28 RX ORDER — BENZONATATE 200 MG/1
200 CAPSULE ORAL 3 TIMES DAILY PRN
Qty: 30 CAPSULE | Refills: 0 | Status: SHIPPED | OUTPATIENT
Start: 2023-09-28 | End: 2023-10-05

## 2023-09-28 ASSESSMENT — LIFESTYLE VARIABLES
PACKS_PER_DAY: 1
SMOKING_STATUS: NO, I'M A FORMER SMOKER
SMOKING_YEARS: 10

## 2023-09-29 NOTE — PROGRESS NOTES
Marleni Gómez (:  1965) is a 59 y.o. female,Established patient, here for evaluation of the following chief complaint(s):  No chief complaint on file. ASSESSMENT/PLAN:  Sinusitis  Antibiotic provided for 10 days  Push fluids and rest  Use Tylenol/Ibuprofen for pain  Please rtc if not improved. No follow-ups on file. If not improved please rtc       Subjective   SUBJECTIVE/OBJECTIVE:  HPI  Sinus pressure pain  For over one week  Otc medication has not helped  Mucus discharge  Congestion    Review of Systems  See HPI    Objective   Physical Exam       On this date 2023 I have spent 11 minutes reviewing previous notes, test results and face to face with the patient discussing the diagnosis and importance of compliance with the treatment plan as well as documenting on the day of the visit. An electronic signature was used to authenticate this note.     --Ismael Pride, DO

## 2023-10-04 ENCOUNTER — OFFICE VISIT (OUTPATIENT)
Dept: ORTHOPEDIC SURGERY | Age: 58
End: 2023-10-04
Payer: COMMERCIAL

## 2023-10-04 VITALS — BODY MASS INDEX: 33.97 KG/M2 | WEIGHT: 199 LBS | RESPIRATION RATE: 17 BRPM | HEIGHT: 64 IN

## 2023-10-04 DIAGNOSIS — M48.02 FORAMINAL STENOSIS OF CERVICAL REGION: ICD-10-CM

## 2023-10-04 DIAGNOSIS — M48.02 CERVICAL STENOSIS OF SPINAL CANAL: ICD-10-CM

## 2023-10-04 DIAGNOSIS — G96.89 SPINAL CORD CYSTS: ICD-10-CM

## 2023-10-04 DIAGNOSIS — M54.12 CERVICAL RADICULOPATHY: Primary | ICD-10-CM

## 2023-10-04 PROCEDURE — 99213 OFFICE O/P EST LOW 20 MIN: CPT | Performed by: PHYSICIAN ASSISTANT

## 2023-10-04 NOTE — PROGRESS NOTES
Subjective:      Patient ID: Courtney Avina is a 59 y.o. female who is here for follow up evaluation of neck and bilateral arm pain. Mild temporary relief with the recent oral steroids. Here today to review recent MRI results. HPI 9/20/23:  Ms. Courtney Avina is a pleasant 59 y.o. old female here for consultation regarding her neck and right > left arm pain. She states the pain began gradually several months ago without injury. Her pain has steadily worsened since receiving trigger point injections with holistic medications at the chiropractic office about 2 weeks ago. She rates her neck pain 10/10 VAS and shoulder and arm pain 7/10 VAS. She describes the pain as sharp/ burning. Pain is worst with activity and improved some with right arm held above her head. The arm pain radiates to her fingers. She reports numbness and tingling in the right and left upper extremity. She reports perceived weakness of her right and left arm. Patient denies difficulty with fine motor skills. She denies lower extremity symptoms, gait abnormality, saddle numbness and bowel or bladder dysfunction. The pain does interfere with her sleep. 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 Left side is more involved when compared to the right side. Current/Past Treatment:   Physical Therapy: No  Chiropractic:  Yes. Began around 8/1/23 and attended 2 x week until last Wednesday 9/13/23. Injection:  trigger point injection x 1 with some type of holistic ingredients. Medications: Gabapentin 300 mg 3 times daily, tizanidine 4 mg every 6 hours, occasional Advil and Tylenol without relief. Review Of Systems:   Review of symptoms was reviewed and is significant for neck pain and negative for recent weight loss, fatigue, chills, visual disturbances, blood in stool or urine, recent infection.         Past Medical History:   Diagnosis Date    Anxiety

## 2023-10-06 ENCOUNTER — TELEMEDICINE (OUTPATIENT)
Dept: FAMILY MEDICINE CLINIC | Age: 58
End: 2023-10-06
Payer: COMMERCIAL

## 2023-10-06 DIAGNOSIS — S16.1XXD STRAIN OF NECK MUSCLE, SUBSEQUENT ENCOUNTER: ICD-10-CM

## 2023-10-06 DIAGNOSIS — B02.9 HERPES ZOSTER WITHOUT COMPLICATION: Primary | ICD-10-CM

## 2023-10-06 PROCEDURE — 99213 OFFICE O/P EST LOW 20 MIN: CPT | Performed by: FAMILY MEDICINE

## 2023-10-06 RX ORDER — VALACYCLOVIR HYDROCHLORIDE 1 G/1
1000 TABLET, FILM COATED ORAL 3 TIMES DAILY
Qty: 21 TABLET | Refills: 0 | Status: SHIPPED | OUTPATIENT
Start: 2023-10-06 | End: 2023-10-13

## 2023-10-06 RX ORDER — NAPROXEN 500 MG/1
500 TABLET ORAL 2 TIMES DAILY WITH MEALS
Qty: 30 TABLET | Refills: 0 | Status: SHIPPED | OUTPATIENT
Start: 2023-10-06

## 2023-10-06 RX ORDER — METHOCARBAMOL 750 MG/1
750 TABLET, FILM COATED ORAL 4 TIMES DAILY
Qty: 40 TABLET | Refills: 0 | Status: SHIPPED | OUTPATIENT
Start: 2023-10-06 | End: 2023-10-16

## 2023-10-06 NOTE — PROGRESS NOTES
10/6/2023    TELEHEALTH EVALUATION -- Audio/Visual    HPI:    Jacek Ennis (:  1965) has requested an audio/video evaluation for the following concern(s):  Shingles  Lower back  For several days  Radiating pain  Several lesions present    Cervical neck pain  Right sided  Reduced ROM  No injury  \"Pinched muscle\"? Seeing a chiropractor. Today, denied chest pain, sob, n, v, or diarrhea. Review of Systems   Constitutional:  Negative for activity change and fatigue. Respiratory:  Negative for shortness of breath. Cardiovascular:  Negative for chest pain. Gastrointestinal:  Negative for abdominal pain. Musculoskeletal:  Positive for arthralgias. Skin:  Positive for color change and rash. Psychiatric/Behavioral:  Negative for dysphoric mood. The patient is not nervous/anxious. Prior to Visit Medications    Medication Sig Taking?  Authorizing Provider   valACYclovir (VALTREX) 1 g tablet Take 1 tablet by mouth 3 times daily for 7 days Yes Soy Phillips, DO   naproxen (NAPROSYN) 500 MG tablet Take 1 tablet by mouth 2 times daily (with meals) Yes Soy Phillips, DO   methocarbamol (ROBAXIN-750) 750 MG tablet Take 1 tablet by mouth 4 times daily for 10 days Yes Soy Phillips, DO   amoxicillin (AMOXIL) 875 MG tablet Take 1 tablet by mouth 2 times daily for 10 days  Soy Phillips, DO   tiZANidine (ZANAFLEX) 4 MG tablet TAKE 1 TABLET BY MOUTH 4 TIMES DAILY AS NEEDED (SPASM)  Soy Phillips, DO   rosuvastatin (CRESTOR) 40 MG tablet Take 1 tablet by mouth daily  Soy Phillips, DO   omeprazole (PRILOSEC) 40 MG delayed release capsule Take 1 capsule by mouth daily  Soy Phillips, DO   lisinopril (PRINIVIL;ZESTRIL) 10 MG tablet Take 1 tablet by mouth daily  Soy Phillips, DO   levothyroxine (SYNTHROID) 100 MCG tablet Take 1 tablet by mouth daily  Soy Phillips, DO   PARoxetine (PAXIL) 30 MG tablet Take 1 tablet by mouth daily  Soy Phillips, DO   gabapentin (NEURONTIN) 300 MG

## 2023-10-07 ASSESSMENT — ENCOUNTER SYMPTOMS
ABDOMINAL PAIN: 0
COLOR CHANGE: 1
SHORTNESS OF BREATH: 0

## 2024-01-12 ENCOUNTER — OFFICE VISIT (OUTPATIENT)
Dept: FAMILY MEDICINE CLINIC | Age: 59
End: 2024-01-12
Payer: COMMERCIAL

## 2024-01-12 VITALS
SYSTOLIC BLOOD PRESSURE: 128 MMHG | BODY MASS INDEX: 35.51 KG/M2 | DIASTOLIC BLOOD PRESSURE: 82 MMHG | HEIGHT: 64 IN | WEIGHT: 208 LBS

## 2024-01-12 DIAGNOSIS — Z01.818 PRE-OP EXAM: Primary | ICD-10-CM

## 2024-01-12 LAB
ANION GAP SERPL CALCULATED.3IONS-SCNC: 11 MMOL/L (ref 3–16)
BUN SERPL-MCNC: 12 MG/DL (ref 7–20)
CALCIUM SERPL-MCNC: 9 MG/DL (ref 8.3–10.6)
CHLORIDE SERPL-SCNC: 105 MMOL/L (ref 99–110)
CO2 SERPL-SCNC: 25 MMOL/L (ref 21–32)
CREAT SERPL-MCNC: 0.6 MG/DL (ref 0.6–1.2)
DEPRECATED RDW RBC AUTO: 13.4 % (ref 12.4–15.4)
GFR SERPLBLD CREATININE-BSD FMLA CKD-EPI: >60 ML/MIN/{1.73_M2}
GLUCOSE SERPL-MCNC: 87 MG/DL (ref 70–99)
HCT VFR BLD AUTO: 40.2 % (ref 36–48)
HGB BLD-MCNC: 13.6 G/DL (ref 12–16)
MCH RBC QN AUTO: 31.9 PG (ref 26–34)
MCHC RBC AUTO-ENTMCNC: 33.9 G/DL (ref 31–36)
MCV RBC AUTO: 94.2 FL (ref 80–100)
PLATELET # BLD AUTO: 275 K/UL (ref 135–450)
PMV BLD AUTO: 8.6 FL (ref 5–10.5)
POTASSIUM SERPL-SCNC: 4.1 MMOL/L (ref 3.5–5.1)
RBC # BLD AUTO: 4.27 M/UL (ref 4–5.2)
SODIUM SERPL-SCNC: 141 MMOL/L (ref 136–145)
WBC # BLD AUTO: 7 K/UL (ref 4–11)

## 2024-01-12 PROCEDURE — 99214 OFFICE O/P EST MOD 30 MIN: CPT | Performed by: FAMILY MEDICINE

## 2024-01-12 PROCEDURE — 3074F SYST BP LT 130 MM HG: CPT | Performed by: FAMILY MEDICINE

## 2024-01-12 PROCEDURE — 3079F DIAST BP 80-89 MM HG: CPT | Performed by: FAMILY MEDICINE

## 2024-01-12 PROCEDURE — 93000 ELECTROCARDIOGRAM COMPLETE: CPT | Performed by: FAMILY MEDICINE

## 2024-01-12 PROCEDURE — 36415 COLL VENOUS BLD VENIPUNCTURE: CPT | Performed by: FAMILY MEDICINE

## 2024-01-12 ASSESSMENT — PATIENT HEALTH QUESTIONNAIRE - PHQ9
SUM OF ALL RESPONSES TO PHQ QUESTIONS 1-9: 0
5. POOR APPETITE OR OVEREATING: 0
10. IF YOU CHECKED OFF ANY PROBLEMS, HOW DIFFICULT HAVE THESE PROBLEMS MADE IT FOR YOU TO DO YOUR WORK, TAKE CARE OF THINGS AT HOME, OR GET ALONG WITH OTHER PEOPLE: 0
SUM OF ALL RESPONSES TO PHQ QUESTIONS 1-9: 0
7. TROUBLE CONCENTRATING ON THINGS, SUCH AS READING THE NEWSPAPER OR WATCHING TELEVISION: 0
2. FEELING DOWN, DEPRESSED OR HOPELESS: 0
8. MOVING OR SPEAKING SO SLOWLY THAT OTHER PEOPLE COULD HAVE NOTICED. OR THE OPPOSITE, BEING SO FIGETY OR RESTLESS THAT YOU HAVE BEEN MOVING AROUND A LOT MORE THAN USUAL: 0
6. FEELING BAD ABOUT YOURSELF - OR THAT YOU ARE A FAILURE OR HAVE LET YOURSELF OR YOUR FAMILY DOWN: 0
4. FEELING TIRED OR HAVING LITTLE ENERGY: 0
9. THOUGHTS THAT YOU WOULD BE BETTER OFF DEAD, OR OF HURTING YOURSELF: 0
SUM OF ALL RESPONSES TO PHQ QUESTIONS 1-9: 0
3. TROUBLE FALLING OR STAYING ASLEEP: 0
SUM OF ALL RESPONSES TO PHQ QUESTIONS 1-9: 0
SUM OF ALL RESPONSES TO PHQ9 QUESTIONS 1 & 2: 0
1. LITTLE INTEREST OR PLEASURE IN DOING THINGS: 0

## 2024-01-12 NOTE — PROGRESS NOTES
2024     Tanya Logan (:  1965) is a 58 y.o. female, here for evaluation of the following medical concerns:    HPI    Patient has pre-op today due to upcoming surgery for cervical stenosis.     1.  Cardiac concerns- Ability to climb stairs? Yes, Walk up a hill? Yes, Do heavy lifting around the house? Yes,  Exercise tolerance? yes, History of chest pain, NO or SOB, No, has hx of wheezing , NO,  symptoms of sleep apnea, No , Family history of heart disease? No     2.  Problems with anesthesia? No, Family history of problems with anesthesia? No, Alcohol use? No,   Tobacco use? No , Drug  Use? No,  no hx of  bleeding disorder, clotting     3. Patient has several medical conditions that are treated and stable with medication.  she feels well today and doesn't have a new complaint.        Today, denied chest pain, sob, n, v, or diarrhea.  Review of Systems   Constitutional:  Negative for activity change, fatigue, fever and unexpected weight change.   HENT:  Negative for congestion, ear pain, facial swelling, mouth sores, rhinorrhea, sinus pressure, sore throat and trouble swallowing.    Eyes:  Negative for visual disturbance.   Respiratory:  Negative for cough, chest tightness, shortness of breath and wheezing.    Cardiovascular:  Negative for chest pain and leg swelling.   Gastrointestinal:  Negative for abdominal pain, diarrhea, nausea and vomiting.   Endocrine: Negative for cold intolerance, heat intolerance, polydipsia and polyphagia.   Musculoskeletal:  Positive for arthralgias, neck pain and neck stiffness.   Skin:  Negative for rash.   Allergic/Immunologic: Negative for food allergies.   Neurological:  Negative for dizziness, tremors, syncope, numbness and headaches.   Hematological:  Does not bruise/bleed easily.   Psychiatric/Behavioral:  Negative for suicidal ideas. The patient is not nervous/anxious.        Prior to Visit Medications    Medication Sig Taking? Authorizing Provider

## 2024-01-13 ASSESSMENT — ENCOUNTER SYMPTOMS
WHEEZING: 0
CHEST TIGHTNESS: 0
SHORTNESS OF BREATH: 0
ABDOMINAL PAIN: 0
RHINORRHEA: 0
SINUS PRESSURE: 0
DIARRHEA: 0
VOMITING: 0
SORE THROAT: 0
COUGH: 0
FACIAL SWELLING: 0
NAUSEA: 0
TROUBLE SWALLOWING: 0

## 2024-01-22 ENCOUNTER — NURSE ONLY (OUTPATIENT)
Dept: FAMILY MEDICINE CLINIC | Age: 59
End: 2024-01-22
Payer: COMMERCIAL

## 2024-01-22 DIAGNOSIS — Z01.818 PRE-OP EXAM: Primary | ICD-10-CM

## 2024-01-22 LAB
APTT BLD: 29.7 SEC (ref 22.7–35.9)
INR PPP: 0.92 (ref 0.84–1.16)
PROTHROMBIN TIME: 12.4 SEC (ref 11.5–14.8)

## 2024-01-22 PROCEDURE — 36415 COLL VENOUS BLD VENIPUNCTURE: CPT | Performed by: FAMILY MEDICINE

## 2024-01-22 NOTE — PROGRESS NOTES
Pt in for non fasting labs.     Needed for surgery 9/24/24     2 Blue collected RA.     STAT Called @ 917am     Tr #[id number]    Fax Results to [place] [phone]

## 2024-02-28 RX ORDER — ROSUVASTATIN CALCIUM 40 MG/1
40 TABLET, COATED ORAL DAILY
Qty: 90 TABLET | Refills: 1 | Status: SHIPPED | OUTPATIENT
Start: 2024-02-28

## 2024-02-28 NOTE — TELEPHONE ENCOUNTER
Last office visit 1/12/2024       Next office visit scheduled Visit date not found    Requested Prescriptions     Pending Prescriptions Disp Refills    rosuvastatin (CRESTOR) 40 MG tablet [Pharmacy Med Name: ROSUVASTATIN CALCIUM 40 MG TAB] 90 tablet 1     Sig: TAKE 1 TABLET BY MOUTH EVERY DAY

## 2024-03-21 RX ORDER — ROSUVASTATIN CALCIUM 20 MG/1
20 TABLET, COATED ORAL DAILY
Qty: 90 TABLET | Refills: 1 | OUTPATIENT
Start: 2024-03-21

## 2024-03-21 NOTE — TELEPHONE ENCOUNTER
Last office visit 1/12/2024     Last written      Next office visit scheduled Visit date not found    Requested Prescriptions     Pending Prescriptions Disp Refills    rosuvastatin (CRESTOR) 20 MG tablet [Pharmacy Med Name: ROSUVASTATIN CALCIUM 20 MG TAB] 90 tablet 1     Sig: TAKE 1 TABLET BY MOUTH EVERY DAY

## 2024-04-17 DIAGNOSIS — R10.13 DYSPEPSIA: ICD-10-CM

## 2024-04-17 DIAGNOSIS — E03.9 ACQUIRED HYPOTHYROIDISM: ICD-10-CM

## 2024-04-17 RX ORDER — LEVOTHYROXINE SODIUM 0.1 MG/1
100 TABLET ORAL DAILY
Qty: 90 TABLET | Refills: 1 | Status: SHIPPED | OUTPATIENT
Start: 2024-04-17

## 2024-04-17 RX ORDER — LISINOPRIL 10 MG/1
10 TABLET ORAL DAILY
Qty: 90 TABLET | Refills: 1 | Status: SHIPPED | OUTPATIENT
Start: 2024-04-17

## 2024-04-17 RX ORDER — OMEPRAZOLE 40 MG/1
CAPSULE, DELAYED RELEASE ORAL DAILY
Qty: 90 CAPSULE | Refills: 1 | Status: SHIPPED | OUTPATIENT
Start: 2024-04-17

## 2024-04-17 NOTE — TELEPHONE ENCOUNTER
Last office visit 1/12/2024     Last written      Next office visit scheduled Visit date not found    Requested Prescriptions     Pending Prescriptions Disp Refills    omeprazole (PRILOSEC) 40 MG delayed release capsule [Pharmacy Med Name: OMEPRAZOLE DR 40 MG CAPSULE] 90 capsule 1     Sig: TAKE 1 CAPSULE BY MOUTH EVERY DAY    lisinopril (PRINIVIL;ZESTRIL) 10 MG tablet [Pharmacy Med Name: LISINOPRIL 10 MG TABLET] 90 tablet 1     Sig: TAKE 1 TABLET BY MOUTH EVERY DAY    levothyroxine (SYNTHROID) 100 MCG tablet [Pharmacy Med Name: LEVOTHYROXINE 100 MCG TABLET] 90 tablet 1     Sig: TAKE 1 TABLET BY MOUTH EVERY DAY

## 2024-04-30 ENCOUNTER — OFFICE VISIT (OUTPATIENT)
Dept: FAMILY MEDICINE CLINIC | Age: 59
End: 2024-04-30
Payer: COMMERCIAL

## 2024-04-30 DIAGNOSIS — I10 ESSENTIAL HYPERTENSION: ICD-10-CM

## 2024-04-30 DIAGNOSIS — M62.830 SPASM OF THORACIC BACK MUSCLE: Primary | ICD-10-CM

## 2024-04-30 DIAGNOSIS — M47.816 LUMBAR FACET ARTHROPATHY: ICD-10-CM

## 2024-04-30 DIAGNOSIS — R39.9 UTI SYMPTOMS: ICD-10-CM

## 2024-04-30 LAB
BILIRUBIN, POC: NEGATIVE
BLOOD URINE, POC: NEGATIVE
CLARITY, POC: NORMAL
COLOR, POC: NORMAL
GLUCOSE URINE, POC: NEGATIVE
KETONES, POC: NEGATIVE
LEUKOCYTE EST, POC: NEGATIVE
NITRITE, POC: NEGATIVE
PH, POC: 6
PROTEIN, POC: NEGATIVE
SPECIFIC GRAVITY, POC: 1.01
UROBILINOGEN, POC: 0.2

## 2024-04-30 PROCEDURE — 99214 OFFICE O/P EST MOD 30 MIN: CPT | Performed by: FAMILY MEDICINE

## 2024-04-30 PROCEDURE — 81002 URINALYSIS NONAUTO W/O SCOPE: CPT | Performed by: FAMILY MEDICINE

## 2024-04-30 PROCEDURE — 3074F SYST BP LT 130 MM HG: CPT | Performed by: FAMILY MEDICINE

## 2024-04-30 PROCEDURE — 3079F DIAST BP 80-89 MM HG: CPT | Performed by: FAMILY MEDICINE

## 2024-04-30 RX ORDER — METHOCARBAMOL 750 MG/1
750 TABLET, FILM COATED ORAL 3 TIMES DAILY
Qty: 30 TABLET | Refills: 0 | Status: SHIPPED | OUTPATIENT
Start: 2024-04-30 | End: 2024-05-10

## 2024-04-30 RX ORDER — LIDOCAINE 4 G/G
1 PATCH TOPICAL DAILY
Qty: 30 PATCH | Refills: 0 | Status: SHIPPED | OUTPATIENT
Start: 2024-04-30 | End: 2024-05-30

## 2024-04-30 SDOH — ECONOMIC STABILITY: FOOD INSECURITY: WITHIN THE PAST 12 MONTHS, YOU WORRIED THAT YOUR FOOD WOULD RUN OUT BEFORE YOU GOT MONEY TO BUY MORE.: NEVER TRUE

## 2024-04-30 SDOH — ECONOMIC STABILITY: FOOD INSECURITY: WITHIN THE PAST 12 MONTHS, THE FOOD YOU BOUGHT JUST DIDN'T LAST AND YOU DIDN'T HAVE MONEY TO GET MORE.: NEVER TRUE

## 2024-04-30 SDOH — ECONOMIC STABILITY: HOUSING INSECURITY
IN THE LAST 12 MONTHS, WAS THERE A TIME WHEN YOU DID NOT HAVE A STEADY PLACE TO SLEEP OR SLEPT IN A SHELTER (INCLUDING NOW)?: NO

## 2024-04-30 SDOH — ECONOMIC STABILITY: INCOME INSECURITY: HOW HARD IS IT FOR YOU TO PAY FOR THE VERY BASICS LIKE FOOD, HOUSING, MEDICAL CARE, AND HEATING?: NOT HARD AT ALL

## 2024-04-30 NOTE — PROGRESS NOTES
2024     Tanya Logan (:  1965) is a 58 y.o. female, here for evaluation of the following medical concerns:    HPI    Patient presented due to ongoing back pain.  This is not a new problem and has had multiple issues with back pain.  Today, she stated that the mid back area is worse but still has lower back pain.   She does have concern about UTI.     Thoracic back spasms- has pain with palpation on both sides of spinal column, has tender spots and hypertonic musculature in this area, pain is greater when palpating these areas.  Unsure how she injured this area, denied trauma or injury, pain can be intense at times, thus the reason she is in office.     HTN- she is taking medication as prescribed, Lisinopril 10 mg, she denied dizziness, syncope or headahce,     Lower back pain- lumbar facet athropathy- stable today, has pain but much milder compared to her thoracic spasms.     UTI symptoms- urgency as well as lower back pain, resulted in UA.  UA was wnl.     Today, she denied chest pain, sob, n, v or diarrhea.    Review of Systems   Constitutional:  Positive for fatigue. Negative for activity change, fever and unexpected weight change.   HENT:  Negative for congestion, ear pain, facial swelling, mouth sores, rhinorrhea, sinus pressure, sore throat and trouble swallowing.    Eyes:  Negative for visual disturbance.   Respiratory:  Negative for cough, chest tightness, shortness of breath and wheezing.    Cardiovascular:  Negative for chest pain and leg swelling.   Gastrointestinal:  Negative for abdominal pain, diarrhea, nausea and vomiting.   Endocrine: Negative for cold intolerance, heat intolerance, polydipsia and polyphagia.   Genitourinary:  Positive for urgency. Negative for dyspareunia, flank pain, frequency, hematuria, pelvic pain and vaginal bleeding.   Musculoskeletal:  Positive for arthralgias, back pain and gait problem.   Skin:  Negative for rash.   Allergic/Immunologic: Negative for

## 2024-05-07 VITALS
DIASTOLIC BLOOD PRESSURE: 84 MMHG | SYSTOLIC BLOOD PRESSURE: 128 MMHG | WEIGHT: 208.4 LBS | HEIGHT: 64 IN | BODY MASS INDEX: 35.58 KG/M2

## 2024-05-07 ASSESSMENT — ENCOUNTER SYMPTOMS
SORE THROAT: 0
FACIAL SWELLING: 0
TROUBLE SWALLOWING: 0
VOMITING: 0
COUGH: 0
CHEST TIGHTNESS: 0
DIARRHEA: 0
ABDOMINAL PAIN: 0
RHINORRHEA: 0
SINUS PRESSURE: 0
BACK PAIN: 1
WHEEZING: 0
NAUSEA: 0
SHORTNESS OF BREATH: 0

## 2024-05-21 ENCOUNTER — OFFICE VISIT (OUTPATIENT)
Dept: ORTHOPEDIC SURGERY | Age: 59
End: 2024-05-21
Payer: COMMERCIAL

## 2024-05-21 VITALS — HEIGHT: 64 IN | WEIGHT: 198 LBS | BODY MASS INDEX: 33.8 KG/M2

## 2024-05-21 DIAGNOSIS — M25.512 CHRONIC LEFT SHOULDER PAIN: ICD-10-CM

## 2024-05-21 DIAGNOSIS — G89.29 CHRONIC LEFT SHOULDER PAIN: ICD-10-CM

## 2024-05-21 DIAGNOSIS — M67.912 TENDINOPATHY OF LEFT ROTATOR CUFF: Primary | ICD-10-CM

## 2024-05-21 PROCEDURE — 20610 DRAIN/INJ JOINT/BURSA W/O US: CPT | Performed by: STUDENT IN AN ORGANIZED HEALTH CARE EDUCATION/TRAINING PROGRAM

## 2024-05-21 PROCEDURE — 99213 OFFICE O/P EST LOW 20 MIN: CPT | Performed by: STUDENT IN AN ORGANIZED HEALTH CARE EDUCATION/TRAINING PROGRAM

## 2024-05-21 RX ORDER — LIDOCAINE HYDROCHLORIDE 10 MG/ML
4 INJECTION, SOLUTION INFILTRATION; PERINEURAL ONCE
Status: COMPLETED | OUTPATIENT
Start: 2024-05-21 | End: 2024-05-21

## 2024-05-21 RX ORDER — TRIAMCINOLONE ACETONIDE 40 MG/ML
40 INJECTION, SUSPENSION INTRA-ARTICULAR; INTRAMUSCULAR ONCE
Status: COMPLETED | OUTPATIENT
Start: 2024-05-21 | End: 2024-05-21

## 2024-05-21 RX ORDER — BUPIVACAINE HYDROCHLORIDE 2.5 MG/ML
4 INJECTION, SOLUTION INFILTRATION; PERINEURAL ONCE
Status: COMPLETED | OUTPATIENT
Start: 2024-05-21 | End: 2024-05-21

## 2024-05-21 RX ADMIN — BUPIVACAINE HYDROCHLORIDE 10 MG: 2.5 INJECTION, SOLUTION INFILTRATION; PERINEURAL at 10:24

## 2024-05-21 RX ADMIN — TRIAMCINOLONE ACETONIDE 40 MG: 40 INJECTION, SUSPENSION INTRA-ARTICULAR; INTRAMUSCULAR at 10:24

## 2024-05-21 RX ADMIN — LIDOCAINE HYDROCHLORIDE 4 ML: 10 INJECTION, SOLUTION INFILTRATION; PERINEURAL at 10:24

## 2024-05-21 NOTE — PROGRESS NOTES
CHIEF COMPLAINT: Left shoulder pain    DATE OF ONSET: 1 week    History:    Tanya Logan is a 58 y.o. right handed female self-referred for evaluation and treatment of Left shoulder pain. She had a left shoulder arthroscopy, subacromial decompression by Dr. Maurice on 1/28/22. She had been doing well until now. This is evaluated as a personal injury. The pain began 1 week ago.  Pain is rated as a 5/10.   There was not an injury. She was using a shop vac and broom and pain worsened after. The pain is located superior and lateral. It is worse with reaching overhead and behind her back. She states the shoulder feels heavy and pain worsens throughout the day.   The patient has had PT prior to and after surgery. The patient has not had an injection post surgery. The patient has tried NSAIDs. The patient has tried ice.     Outside reports reviewed:  office notes.    Past Medical History:   Diagnosis Date    Anxiety     Cancer (HCC) 03/29/2018    Cervical    Hypertension     Hyperthyroidism     Hypothyroidism 2016       Past Surgical History:   Procedure Laterality Date    COLONOSCOPY      HYSTERECTOMY (CERVIX STATUS UNKNOWN)  05/14/2018    Robotic-assisted Total Laparoscopic Hysterectomy with Bilateral Salpingo-oophorectomy    SHOULDER ARTHROSCOPY Left 1/28/2022    LEFT SHOULDER ARTHROSCOPY, SUBACROMIAL DECOMPRESSION, CHRONDOPLASTY performed by Nate Maurice MD at [facility] OR    TONSILLECTOMY      TUBAL LIGATION         Current Outpatient Medications on File Prior to Visit   Medication Sig Dispense Refill    lidocaine 4 % external patch Place 1 patch onto the skin daily 30 patch 0    omeprazole (PRILOSEC) 40 MG delayed release capsule TAKE 1 CAPSULE BY MOUTH EVERY DAY 90 capsule 1    lisinopril (PRINIVIL;ZESTRIL) 10 MG tablet TAKE 1 TABLET BY MOUTH EVERY DAY 90 tablet 1    levothyroxine (SYNTHROID) 100 MCG tablet TAKE 1 TABLET BY MOUTH EVERY DAY 90 tablet 1    rosuvastatin (CRESTOR) 40 MG tablet TAKE 1 TABLET BY MOUTH

## 2024-06-07 RX ORDER — PAROXETINE 30 MG/1
30 TABLET, FILM COATED ORAL DAILY
Qty: 90 TABLET | Refills: 1 | Status: SHIPPED | OUTPATIENT
Start: 2024-06-07

## 2024-06-07 RX ORDER — ROSUVASTATIN CALCIUM 40 MG/1
40 TABLET, COATED ORAL DAILY
Qty: 90 TABLET | Refills: 1 | Status: SHIPPED | OUTPATIENT
Start: 2024-06-07

## 2024-07-05 ENCOUNTER — OFFICE VISIT (OUTPATIENT)
Dept: ORTHOPEDIC SURGERY | Age: 59
End: 2024-07-05
Payer: COMMERCIAL

## 2024-07-05 VITALS — BODY MASS INDEX: 33.8 KG/M2 | RESPIRATION RATE: 12 BRPM | WEIGHT: 198 LBS | HEIGHT: 64 IN

## 2024-07-05 DIAGNOSIS — S32.10XA CLOSED FRACTURE OF SACRUM AND COCCYX, INITIAL ENCOUNTER (HCC): Primary | ICD-10-CM

## 2024-07-05 DIAGNOSIS — S32.2XXA CLOSED FRACTURE OF SACRUM AND COCCYX, INITIAL ENCOUNTER (HCC): Primary | ICD-10-CM

## 2024-07-05 DIAGNOSIS — M53.3 PAIN IN SACRUM: ICD-10-CM

## 2024-07-05 PROCEDURE — 99214 OFFICE O/P EST MOD 30 MIN: CPT | Performed by: PHYSICIAN ASSISTANT

## 2024-07-05 RX ORDER — HYDROCODONE BITARTRATE AND ACETAMINOPHEN 5; 325 MG/1; MG/1
TABLET ORAL
COMMUNITY
Start: 2024-03-01 | End: 2024-07-05

## 2024-07-05 RX ORDER — BUDESONIDE 3 MG/1
9 CAPSULE, COATED PELLETS ORAL DAILY
COMMUNITY
Start: 2024-05-08 | End: 2024-07-05

## 2024-07-05 RX ORDER — HYDROCODONE BITARTRATE AND ACETAMINOPHEN 5; 325 MG/1; MG/1
1 TABLET ORAL EVERY 4 HOURS PRN
Qty: 30 TABLET | Refills: 0 | Status: SHIPPED | OUTPATIENT
Start: 2024-07-05 | End: 2024-07-10

## 2024-07-05 NOTE — PROGRESS NOTES
Referring Provider:  No ref. provider found    CHIEF COMPLAINT:    Chief Complaint   Patient presents with    Tailbone Pain       HISTORY OF PRESENT ILLNESS:    Ms. Tanya Logan  is a pleasant 58 y.o. female here for consultation regarding her LBP.  Approximately 1 week ago the patient fell backwards onto her buttocks onto concrete.  She denies hitting her head or LOC.  She had pain getting up but was able to do so.  The symptoms have progressively gotten worse and she notices discomfort mostly over the tailbone/coccyx with sitting or certain positions.  She denies any lower extremity numbness or paresthesias as well as no decreased bowel or bladder function.  Conservative treatment has included: Rest, ice, activity modification and over-the-counter pain medication.  At its worst symptoms are rated 9/10 described as achy and occasionally sharp.      Past Medical History:   Past Medical History:   Diagnosis Date    Anxiety     Cancer (HCC) 03/29/2018    Cervical    Hypertension     Hyperthyroidism     Hypothyroidism 2016        Past Surgical History:     Past Surgical History:   Procedure Laterality Date    COLONOSCOPY      HYSTERECTOMY (CERVIX STATUS UNKNOWN)  05/14/2018    Robotic-assisted Total Laparoscopic Hysterectomy with Bilateral Salpingo-oophorectomy    SHOULDER ARTHROSCOPY Left 1/28/2022    LEFT SHOULDER ARTHROSCOPY, SUBACROMIAL DECOMPRESSION, CHRONDOPLASTY performed by Nate Maurice MD at [facility] OR    TONSILLECTOMY      TUBAL LIGATION         Current Medications:     Current Outpatient Medications:     HYDROcodone-acetaminophen (NORCO) 5-325 MG per tablet, Take 1 tablet by mouth every 4 hours as needed for Pain for up to 5 days. Intended supply: 5 days. Take lowest dose possible to manage pain Max Daily Amount: 6 tablets, Disp: 30 tablet, Rfl: 0    rosuvastatin (CRESTOR) 40 MG tablet, TAKE 1 TABLET BY MOUTH EVERY DAY, Disp: 90 tablet, Rfl: 1    PARoxetine (PAXIL) 30 MG tablet, TAKE 1 TABLET BY

## 2024-09-23 DIAGNOSIS — R20.2 PARESTHESIA OF LEFT ARM: ICD-10-CM

## 2024-09-23 RX ORDER — GABAPENTIN 300 MG/1
300 CAPSULE ORAL 3 TIMES DAILY
Qty: 270 CAPSULE | Refills: 2 | Status: SHIPPED | OUTPATIENT
Start: 2024-09-23 | End: 2025-06-20

## 2024-10-07 ENCOUNTER — OFFICE VISIT (OUTPATIENT)
Dept: ORTHOPEDIC SURGERY | Age: 59
End: 2024-10-07
Payer: COMMERCIAL

## 2024-10-07 ENCOUNTER — TELEPHONE (OUTPATIENT)
Dept: ORTHOPEDIC SURGERY | Age: 59
End: 2024-10-07

## 2024-10-07 VITALS — HEIGHT: 64 IN | BODY MASS INDEX: 34.83 KG/M2 | WEIGHT: 204 LBS

## 2024-10-07 DIAGNOSIS — M19.012 PRIMARY OSTEOARTHRITIS OF LEFT SHOULDER: Primary | ICD-10-CM

## 2024-10-07 DIAGNOSIS — M75.82 ROTATOR CUFF TENDINITIS, LEFT: ICD-10-CM

## 2024-10-07 PROCEDURE — G8427 DOCREV CUR MEDS BY ELIG CLIN: HCPCS | Performed by: STUDENT IN AN ORGANIZED HEALTH CARE EDUCATION/TRAINING PROGRAM

## 2024-10-07 PROCEDURE — 20610 DRAIN/INJ JOINT/BURSA W/O US: CPT | Performed by: STUDENT IN AN ORGANIZED HEALTH CARE EDUCATION/TRAINING PROGRAM

## 2024-10-07 PROCEDURE — G8417 CALC BMI ABV UP PARAM F/U: HCPCS | Performed by: STUDENT IN AN ORGANIZED HEALTH CARE EDUCATION/TRAINING PROGRAM

## 2024-10-07 PROCEDURE — 99213 OFFICE O/P EST LOW 20 MIN: CPT | Performed by: STUDENT IN AN ORGANIZED HEALTH CARE EDUCATION/TRAINING PROGRAM

## 2024-10-07 PROCEDURE — 1036F TOBACCO NON-USER: CPT | Performed by: STUDENT IN AN ORGANIZED HEALTH CARE EDUCATION/TRAINING PROGRAM

## 2024-10-07 PROCEDURE — 3017F COLORECTAL CA SCREEN DOC REV: CPT | Performed by: STUDENT IN AN ORGANIZED HEALTH CARE EDUCATION/TRAINING PROGRAM

## 2024-10-07 PROCEDURE — G8484 FLU IMMUNIZE NO ADMIN: HCPCS | Performed by: STUDENT IN AN ORGANIZED HEALTH CARE EDUCATION/TRAINING PROGRAM

## 2024-10-07 RX ORDER — IBUPROFEN 200 MG
200 TABLET ORAL EVERY 6 HOURS PRN
COMMUNITY

## 2024-10-07 RX ORDER — BUPIVACAINE HYDROCHLORIDE 2.5 MG/ML
4 INJECTION, SOLUTION INFILTRATION; PERINEURAL ONCE
Status: COMPLETED | OUTPATIENT
Start: 2024-10-07 | End: 2024-10-07

## 2024-10-07 RX ORDER — TRIAMCINOLONE ACETONIDE 40 MG/ML
40 INJECTION, SUSPENSION INTRA-ARTICULAR; INTRAMUSCULAR ONCE
Status: COMPLETED | OUTPATIENT
Start: 2024-10-07 | End: 2024-10-07

## 2024-10-07 RX ORDER — LIDOCAINE HYDROCHLORIDE 10 MG/ML
4 INJECTION, SOLUTION INFILTRATION; PERINEURAL ONCE
Status: COMPLETED | OUTPATIENT
Start: 2024-10-07 | End: 2024-10-07

## 2024-10-07 RX ORDER — ACETAMINOPHEN 500 MG
500 TABLET ORAL EVERY 6 HOURS PRN
COMMUNITY

## 2024-10-07 RX ADMIN — BUPIVACAINE HYDROCHLORIDE 10 MG: 2.5 INJECTION, SOLUTION INFILTRATION; PERINEURAL at 10:24

## 2024-10-07 RX ADMIN — LIDOCAINE HYDROCHLORIDE 4 ML: 10 INJECTION, SOLUTION INFILTRATION; PERINEURAL at 10:25

## 2024-10-07 RX ADMIN — TRIAMCINOLONE ACETONIDE 40 MG: 40 INJECTION, SUSPENSION INTRA-ARTICULAR; INTRAMUSCULAR at 10:25

## 2024-10-07 NOTE — TELEPHONE ENCOUNTER
MRI is authorized for Cecy Diaz    Attempted to call patient re: MRI authorization. No answer/mailbox is full. Authorization is valid through 11/21/2024 and to call [phone] to schedule the MRI. An in office appointment will be required a minimum of three business days after the MRI to obtain results and treatment options.    Sending [employer] message

## 2024-10-07 NOTE — PROGRESS NOTES
CHIEF COMPLAINT: Left shoulder pain    DATE OF ONSET: 1 week    History:    Tanya Logan is a 58 y.o. right handed female self-referred for evaluation and treatment of Left shoulder pain. She had a left shoulder arthroscopy, subacromial decompression by Dr. Maurice on 1/28/22. She had been doing well until now. This is evaluated as a personal injury. The pain began 1 week ago.  Pain is rated as a 5/10.   There was not an injury. She was using a shop vac and broom and pain worsened after. The pain is located superior and lateral. It is worse with reaching overhead and behind her back. She states the shoulder feels heavy and pain worsens throughout the day.   The patient has had PT prior to and after surgery. The patient has not had an injection post surgery. The patient has tried NSAIDs. The patient has tried ice.     Interval history: The patient had 2 weeks of relief from the subacromial cortisone injection. Today she complains of pain more at the joint line. The superior pain has subsided some. Pain does radiate to the deltoid. Is worse with reaching overhead. She notes grinding. Rates pain 8/10.       Past Medical History:   Diagnosis Date    Anxiety     Arthritis 1/28/22    Bursitis 1/28/22    Cancer (HCC) 03/29/2018    Cervical    Hypertension     Hyperthyroidism     Hypothyroidism 2016       Past Surgical History:   Procedure Laterality Date    COLONOSCOPY      HYSTERECTOMY (CERVIX STATUS UNKNOWN)  05/14/2018    Robotic-assisted Total Laparoscopic Hysterectomy with Bilateral Salpingo-oophorectomy    SHOULDER ARTHROSCOPY Left 01/28/2022    LEFT SHOULDER ARTHROSCOPY, SUBACROMIAL DECOMPRESSION, CHRONDOPLASTY performed by Nate Maurice MD at [facility] OR    SHOULDER SURGERY  1/28/22    SPINAL FUSION  [place] 15 2024    TONSILLECTOMY      TUBAL LIGATION         Current Outpatient Medications on File Prior to Visit   Medication Sig Dispense Refill    ibuprofen (ADVIL;MOTRIN) 200 MG tablet Take 1 tablet by mouth every 6

## 2024-10-10 DIAGNOSIS — E03.9 ACQUIRED HYPOTHYROIDISM: ICD-10-CM

## 2024-10-10 DIAGNOSIS — R10.13 DYSPEPSIA: ICD-10-CM

## 2024-10-10 RX ORDER — LISINOPRIL 10 MG/1
10 TABLET ORAL DAILY
Qty: 90 TABLET | Refills: 1 | Status: SHIPPED | OUTPATIENT
Start: 2024-10-10

## 2024-10-10 RX ORDER — OMEPRAZOLE 40 MG/1
CAPSULE, DELAYED RELEASE ORAL DAILY
Qty: 90 CAPSULE | Refills: 1 | Status: SHIPPED | OUTPATIENT
Start: 2024-10-10

## 2024-10-10 RX ORDER — LEVOTHYROXINE SODIUM 100 UG/1
100 TABLET ORAL DAILY
Qty: 90 TABLET | Refills: 1 | Status: SHIPPED | OUTPATIENT
Start: 2024-10-10

## 2024-10-10 NOTE — TELEPHONE ENCOUNTER
Last office visit 4/30/2024     Last written      Next office visit scheduled Visit date not found    Requested Prescriptions     Pending Prescriptions Disp Refills    levothyroxine (SYNTHROID) 100 MCG tablet [Pharmacy Med Name: LEVOTHYROXINE 100 MCG TABLET] 90 tablet 1     Sig: TAKE 1 TABLET BY MOUTH EVERY DAY    omeprazole (PRILOSEC) 40 MG delayed release capsule [Pharmacy Med Name: OMEPRAZOLE DR 40 MG CAPSULE] 90 capsule 1     Sig: TAKE 1 CAPSULE BY MOUTH EVERY DAY    lisinopril (PRINIVIL;ZESTRIL) 10 MG tablet [Pharmacy Med Name: LISINOPRIL 10 MG TABLET] 90 tablet 1     Sig: TAKE 1 TABLET BY MOUTH EVERY DAY

## 2025-01-07 RX ORDER — PAROXETINE 30 MG/1
30 TABLET, FILM COATED ORAL DAILY
Qty: 90 TABLET | Refills: 1 | Status: SHIPPED | OUTPATIENT
Start: 2025-01-07

## 2025-01-07 NOTE — TELEPHONE ENCOUNTER
Last office visit 4/30/2024         Next office visit scheduled Visit date not found    Requested Prescriptions     Pending Prescriptions Disp Refills    PARoxetine (PAXIL) 30 MG tablet [Pharmacy Med Name: PAROXETINE HCL 30 MG TABLET] 90 tablet 1     Sig: TAKE 1 TABLET BY MOUTH EVERY DAY

## 2025-03-03 RX ORDER — ROSUVASTATIN CALCIUM 40 MG/1
40 TABLET, COATED ORAL DAILY
Qty: 90 TABLET | Refills: 1 | Status: SHIPPED | OUTPATIENT
Start: 2025-03-03

## 2025-03-03 NOTE — TELEPHONE ENCOUNTER
Last office visit 4/30/2024       Next office visit scheduled Visit date not found    Requested Prescriptions     Pending Prescriptions Disp Refills    rosuvastatin (CRESTOR) 40 MG tablet [Pharmacy Med Name: ROSUVASTATIN CALCIUM 40 MG TAB] 90 tablet 1     Sig: TAKE 1 TABLET BY MOUTH EVERY DAY

## 2025-03-26 ENCOUNTER — TELEPHONE (OUTPATIENT)
Dept: ORTHOPEDIC SURGERY | Age: 60
End: 2025-03-26

## 2025-03-26 DIAGNOSIS — M75.82 ROTATOR CUFF TENDINITIS, LEFT: ICD-10-CM

## 2025-03-26 DIAGNOSIS — M19.012 PRIMARY OSTEOARTHRITIS OF LEFT SHOULDER: Primary | ICD-10-CM

## 2025-03-26 NOTE — TELEPHONE ENCOUNTER
MRI Authorized in October 10/7/24 and Auth  in 2024 - Patient reached back out and asked for another location other than [facility].     Called patient to see which location they are wanting Location-[facility]-  and we can try and see if her insurance with Authorize it. Order placed.

## 2025-03-26 NOTE — TELEPHONE ENCOUNTER
General Question     Subject: LT SHOULDER MRI REFERRAL   Patient and /or Facility Request: Tanya Logan \"Laurie\"   Contact Number: [phone]     PATIENT CALLED IN TO SEE IF SHE CAN GET AN REFERRAL FOR HER LT SHOULDER MRI AT ANOTHER LOCATION OTHER THAN [facility]..    REQ A CALL BACK..    PLEASE ADVISE

## 2025-03-28 ENCOUNTER — OFFICE VISIT (OUTPATIENT)
Dept: ORTHOPEDIC SURGERY | Age: 60
End: 2025-03-28

## 2025-03-28 VITALS — BODY MASS INDEX: 32.1 KG/M2 | WEIGHT: 188 LBS | HEIGHT: 64 IN

## 2025-03-28 DIAGNOSIS — M70.61 TROCHANTERIC BURSITIS OF RIGHT HIP: ICD-10-CM

## 2025-03-28 DIAGNOSIS — M25.551 PAIN OF RIGHT HIP: Primary | ICD-10-CM

## 2025-03-28 RX ORDER — TRIAMCINOLONE ACETONIDE 40 MG/ML
40 INJECTION, SUSPENSION INTRA-ARTICULAR; INTRAMUSCULAR ONCE
Status: COMPLETED | OUTPATIENT
Start: 2025-03-28 | End: 2025-03-28

## 2025-03-28 RX ORDER — BUPIVACAINE HYDROCHLORIDE 2.5 MG/ML
2 INJECTION, SOLUTION INFILTRATION; PERINEURAL ONCE
Status: COMPLETED | OUTPATIENT
Start: 2025-03-28 | End: 2025-03-28

## 2025-03-28 RX ADMIN — BUPIVACAINE HYDROCHLORIDE 5 MG: 2.5 INJECTION, SOLUTION INFILTRATION; PERINEURAL at 08:41

## 2025-03-28 RX ADMIN — TRIAMCINOLONE ACETONIDE 40 MG: 40 INJECTION, SUSPENSION INTRA-ARTICULAR; INTRAMUSCULAR at 08:41

## 2025-03-28 NOTE — PROGRESS NOTES
to Visit   Medication Sig Dispense Refill    rosuvastatin (CRESTOR) 40 MG tablet TAKE 1 TABLET BY MOUTH EVERY DAY 90 tablet 1    PARoxetine (PAXIL) 30 MG tablet TAKE 1 TABLET BY MOUTH EVERY DAY 90 tablet 1    levothyroxine (SYNTHROID) 100 MCG tablet TAKE 1 TABLET BY MOUTH EVERY DAY 90 tablet 1    omeprazole (PRILOSEC) 40 MG delayed release capsule TAKE 1 CAPSULE BY MOUTH EVERY DAY 90 capsule 1    lisinopril (PRINIVIL;ZESTRIL) 10 MG tablet TAKE 1 TABLET BY MOUTH EVERY DAY 90 tablet 1    ibuprofen (ADVIL;MOTRIN) 200 MG tablet Take 1 tablet by mouth every 6 hours as needed for Pain      acetaminophen (TYLENOL) 500 MG tablet Take 1 tablet by mouth every 6 hours as needed for Pain      gabapentin (NEURONTIN) 300 MG capsule Take 1 capsule by mouth 3 times daily for 270 days. 270 capsule 2    naproxen (NAPROSYN) 500 MG tablet Take 1 tablet by mouth 2 times daily (with meals) 30 tablet 0    tiZANidine (ZANAFLEX) 4 MG tablet TAKE 1 TABLET BY MOUTH 4 TIMES DAILY AS NEEDED (SPASM) 60 tablet 0    calcium carbonate (OSCAL) 500 MG TABS tablet Take 1 tablet by mouth daily      magnesium 30 MG tablet Take 1 tablet by mouth 2 times daily      Multiple Vitamins-Minerals (CENTRUM SILVER PO) Take by mouth       No current facility-administered medications on file prior to visit.         Objective:   Height 1.626 m (5' 4\"), weight 85.3 kg (188 lb), last menstrual period 03/04/2009, not currently breastfeeding.    On examination is a very pleasant 59-year-old female who is alert oriented x 3 she has been sitting standing comfortably she can easily touches her toes has some small low back soreness with extension not significant radicular pain.  She has weakness to hip abduction on the right side and to lesser extent on the left has some palpable tenderness over the greater trochanteric area all consistent with bursitis.  Neuro exam grossly intact both lower extremities. Intact sensation to light touch. Motor exam 4+ to 5/5 in all major

## 2025-04-03 SDOH — HEALTH STABILITY: PHYSICAL HEALTH: ON AVERAGE, HOW MANY MINUTES DO YOU ENGAGE IN EXERCISE AT THIS LEVEL?: 0 MIN

## 2025-04-03 SDOH — HEALTH STABILITY: PHYSICAL HEALTH: ON AVERAGE, HOW MANY DAYS PER WEEK DO YOU ENGAGE IN MODERATE TO STRENUOUS EXERCISE (LIKE A BRISK WALK)?: 0 DAYS

## 2025-04-04 ENCOUNTER — OFFICE VISIT (OUTPATIENT)
Dept: FAMILY MEDICINE CLINIC | Age: 60
End: 2025-04-04
Payer: COMMERCIAL

## 2025-04-04 VITALS
HEIGHT: 64 IN | DIASTOLIC BLOOD PRESSURE: 66 MMHG | TEMPERATURE: 97.6 F | HEART RATE: 83 BPM | BODY MASS INDEX: 31 KG/M2 | WEIGHT: 181.6 LBS | SYSTOLIC BLOOD PRESSURE: 116 MMHG | OXYGEN SATURATION: 97 %

## 2025-04-04 DIAGNOSIS — T14.8XXA BRUISING: ICD-10-CM

## 2025-04-04 DIAGNOSIS — Z76.89 ENCOUNTER TO ESTABLISH CARE: Primary | ICD-10-CM

## 2025-04-04 DIAGNOSIS — E03.9 ACQUIRED HYPOTHYROIDISM: ICD-10-CM

## 2025-04-04 DIAGNOSIS — I10 ESSENTIAL HYPERTENSION: ICD-10-CM

## 2025-04-04 DIAGNOSIS — R11.0 NAUSEA: ICD-10-CM

## 2025-04-04 DIAGNOSIS — E78.2 MIXED HYPERLIPIDEMIA: ICD-10-CM

## 2025-04-04 DIAGNOSIS — Z79.899 LONG-TERM CURRENT USE OF PROTON PUMP INHIBITOR THERAPY: ICD-10-CM

## 2025-04-04 PROCEDURE — 3078F DIAST BP <80 MM HG: CPT | Performed by: NURSE PRACTITIONER

## 2025-04-04 PROCEDURE — 3017F COLORECTAL CA SCREEN DOC REV: CPT | Performed by: NURSE PRACTITIONER

## 2025-04-04 PROCEDURE — 99214 OFFICE O/P EST MOD 30 MIN: CPT | Performed by: NURSE PRACTITIONER

## 2025-04-04 PROCEDURE — G8427 DOCREV CUR MEDS BY ELIG CLIN: HCPCS | Performed by: NURSE PRACTITIONER

## 2025-04-04 PROCEDURE — G8417 CALC BMI ABV UP PARAM F/U: HCPCS | Performed by: NURSE PRACTITIONER

## 2025-04-04 PROCEDURE — 1036F TOBACCO NON-USER: CPT | Performed by: NURSE PRACTITIONER

## 2025-04-04 PROCEDURE — 3074F SYST BP LT 130 MM HG: CPT | Performed by: NURSE PRACTITIONER

## 2025-04-04 RX ORDER — ONDANSETRON 4 MG/1
4 TABLET, FILM COATED ORAL 3 TIMES DAILY PRN
Qty: 30 TABLET | Refills: 3 | Status: SHIPPED | OUTPATIENT
Start: 2025-04-04

## 2025-04-04 RX ORDER — LEVOTHYROXINE SODIUM 88 UG/1
88 TABLET ORAL DAILY
Qty: 90 TABLET | Refills: 1 | Status: SHIPPED | OUTPATIENT
Start: 2025-04-04

## 2025-04-04 SDOH — ECONOMIC STABILITY: FOOD INSECURITY: WITHIN THE PAST 12 MONTHS, THE FOOD YOU BOUGHT JUST DIDN'T LAST AND YOU DIDN'T HAVE MONEY TO GET MORE.: NEVER TRUE

## 2025-04-04 SDOH — ECONOMIC STABILITY: FOOD INSECURITY: WITHIN THE PAST 12 MONTHS, YOU WORRIED THAT YOUR FOOD WOULD RUN OUT BEFORE YOU GOT MONEY TO BUY MORE.: NEVER TRUE

## 2025-04-04 ASSESSMENT — PATIENT HEALTH QUESTIONNAIRE - PHQ9
SUM OF ALL RESPONSES TO PHQ QUESTIONS 1-9: 0
10. IF YOU CHECKED OFF ANY PROBLEMS, HOW DIFFICULT HAVE THESE PROBLEMS MADE IT FOR YOU TO DO YOUR WORK, TAKE CARE OF THINGS AT HOME, OR GET ALONG WITH OTHER PEOPLE: NOT DIFFICULT AT ALL
4. FEELING TIRED OR HAVING LITTLE ENERGY: NOT AT ALL
7. TROUBLE CONCENTRATING ON THINGS, SUCH AS READING THE NEWSPAPER OR WATCHING TELEVISION: NOT AT ALL
SUM OF ALL RESPONSES TO PHQ QUESTIONS 1-9: 0
3. TROUBLE FALLING OR STAYING ASLEEP: NOT AT ALL
5. POOR APPETITE OR OVEREATING: NOT AT ALL
9. THOUGHTS THAT YOU WOULD BE BETTER OFF DEAD, OR OF HURTING YOURSELF: NOT AT ALL
SUM OF ALL RESPONSES TO PHQ QUESTIONS 1-9: 0
8. MOVING OR SPEAKING SO SLOWLY THAT OTHER PEOPLE COULD HAVE NOTICED. OR THE OPPOSITE, BEING SO FIGETY OR RESTLESS THAT YOU HAVE BEEN MOVING AROUND A LOT MORE THAN USUAL: NOT AT ALL
SUM OF ALL RESPONSES TO PHQ QUESTIONS 1-9: 0
2. FEELING DOWN, DEPRESSED OR HOPELESS: NOT AT ALL
6. FEELING BAD ABOUT YOURSELF - OR THAT YOU ARE A FAILURE OR HAVE LET YOURSELF OR YOUR FAMILY DOWN: NOT AT ALL
1. LITTLE INTEREST OR PLEASURE IN DOING THINGS: NOT AT ALL

## 2025-04-04 ASSESSMENT — ENCOUNTER SYMPTOMS
CONSTIPATION: 0
ABDOMINAL PAIN: 0
VOMITING: 1
DIARRHEA: 0
BLOOD IN STOOL: 0
NAUSEA: 1
RESPIRATORY NEGATIVE: 1

## 2025-04-04 NOTE — PROGRESS NOTES
Tanya Logan (:  1965) is a 59 y.o. female,New patient, here for evaluation of the following chief complaint(s):  New Patient (Establish care with Clara Solano NP- ), Nausea (Patient c/o nausea x 2 months; episodes of vomiting.  Patient denies fevers, diarrhea, constipation), and Bleeding/Bruising (Patient c/o easy bruising \"for awhile\".)      Assessment & Plan   ASSESSMENT/PLAN:  1. Encounter to establish care  Reviewed pmh meds and labs.    2. Acquired hypothyroidism  Over controlled. Decreased synthroid to 88 mcg, repeat labs in 8-12 wks.   - TSH; Future    3. Essential hypertension  Stable due for labs, no change to medication  - Comprehensive Metabolic Panel; Future    4. Nausea  NEw. Check labs, follow up with GI  - CBC; Future  - Comprehensive Metabolic Panel; Future  - TESS - Juve Rincon MD, Gastroenterology, [facility]    5. Mixed hyperlipidemia  Not controlled. LDL > 200, pt taking statin as prescribed. Discussed cardiology referral for potential repatha  - Lipid, Fasting; Future    6. Long-term current use of proton pump inhibitor therapy  Stable. New onset n/v. Follow up with GI  - AFL - Juve Rincon MD, Gastroenterology, [facility]    7. Bruising  New. Check labs, discussed increasing green leafy veg, Vit K in diet.  - CBC; Future       Return in about 6 months (around 10/4/2025) for yearly physical.         Subjective   SUBJECTIVE/OBJECTIVE:  HPI  Presents today to est care. PMH sig for HTN, HLD, GERD, cervical cancer, full hysterectomy.   GI-PPI for years, states the last few months watery mouth then vomits, does not correlate with foods. States she has been on PPI for a long time. Hx of colitis with last colonoscopy,  est with Dr. Rincon. States episodes of n/v can be frequent or random. Pt admits to occasional marijuana use, no habitual. Weight down 25 lbs since Oct not trying to loose weight. No change to stools.  Thyroid- takes 100 mcg in am on empty

## 2025-04-05 LAB
ALBUMIN SERPL-MCNC: 4.8 G/DL (ref 3.4–5)
ALBUMIN/GLOB SERPL: 2.3 {RATIO} (ref 1.1–2.2)
ALP SERPL-CCNC: 79 U/L (ref 40–129)
ALT SERPL-CCNC: 21 U/L (ref 10–40)
ANION GAP SERPL CALCULATED.3IONS-SCNC: 12 MMOL/L (ref 3–16)
AST SERPL-CCNC: 20 U/L (ref 15–37)
BILIRUB SERPL-MCNC: 0.7 MG/DL (ref 0–1)
BUN SERPL-MCNC: 14 MG/DL (ref 7–20)
CALCIUM SERPL-MCNC: 9.7 MG/DL (ref 8.3–10.6)
CHLORIDE SERPL-SCNC: 102 MMOL/L (ref 99–110)
CHOLEST SERPL-MCNC: 195 MG/DL (ref 0–199)
CO2 SERPL-SCNC: 23 MMOL/L (ref 21–32)
CREAT SERPL-MCNC: 0.6 MG/DL (ref 0.6–1.2)
DEPRECATED RDW RBC AUTO: 13.1 % (ref 12.4–15.4)
GFR SERPLBLD CREATININE-BSD FMLA CKD-EPI: >90 ML/MIN/{1.73_M2}
GLUCOSE SERPL-MCNC: 90 MG/DL (ref 70–99)
HCT VFR BLD AUTO: 43.7 % (ref 36–48)
HDLC SERPL-MCNC: 69 MG/DL (ref 40–60)
HGB BLD-MCNC: 14.9 G/DL (ref 12–16)
LDL CHOLESTEROL: 102 MG/DL
MCH RBC QN AUTO: 32 PG (ref 26–34)
MCHC RBC AUTO-ENTMCNC: 34.1 G/DL (ref 31–36)
MCV RBC AUTO: 93.8 FL (ref 80–100)
PLATELET # BLD AUTO: 311 K/UL (ref 135–450)
PMV BLD AUTO: 8.9 FL (ref 5–10.5)
POTASSIUM SERPL-SCNC: 4 MMOL/L (ref 3.5–5.1)
PROT SERPL-MCNC: 6.9 G/DL (ref 6.4–8.2)
RBC # BLD AUTO: 4.65 M/UL (ref 4–5.2)
SODIUM SERPL-SCNC: 137 MMOL/L (ref 136–145)
TRIGL SERPL-MCNC: 121 MG/DL (ref 0–150)
VLDLC SERPL CALC-MCNC: 24 MG/DL
WBC # BLD AUTO: 8.7 K/UL (ref 4–11)

## 2025-04-07 ENCOUNTER — RESULTS FOLLOW-UP (OUTPATIENT)
Dept: FAMILY MEDICINE CLINIC | Age: 60
End: 2025-04-07

## 2025-04-07 DIAGNOSIS — I10 ESSENTIAL HYPERTENSION: Primary | ICD-10-CM

## 2025-04-07 DIAGNOSIS — R20.2 PARESTHESIA OF LEFT ARM: ICD-10-CM

## 2025-04-07 RX ORDER — PAROXETINE 30 MG/1
30 TABLET, FILM COATED ORAL DAILY
Qty: 90 TABLET | Refills: 1 | Status: SHIPPED | OUTPATIENT
Start: 2025-04-07

## 2025-04-07 RX ORDER — GABAPENTIN 300 MG/1
300 CAPSULE ORAL 3 TIMES DAILY
Qty: 270 CAPSULE | Refills: 0 | Status: SHIPPED | OUTPATIENT
Start: 2025-04-07 | End: 2026-01-02

## 2025-04-07 RX ORDER — LISINOPRIL 10 MG/1
10 TABLET ORAL DAILY
Qty: 90 TABLET | Refills: 1 | Status: SHIPPED | OUTPATIENT
Start: 2025-04-07

## 2025-04-09 ENCOUNTER — TELEPHONE (OUTPATIENT)
Dept: ORTHOPEDIC SURGERY | Age: 60
End: 2025-04-09

## 2025-04-09 NOTE — TELEPHONE ENCOUNTER
MRI left shoulder has been denied due to lack of conservative treatment. Insurance requesting trial of physical therapy.    Spoke with patient and advised of MRI denial. Offered PT order for [facility]. Patient stated that there are locations near her that cost less. Asked that she let us know where she would like to have order sent once decision is made.    Patient verbalized understanding and agreed.

## 2025-04-15 DIAGNOSIS — R10.13 DYSPEPSIA: ICD-10-CM

## 2025-04-15 RX ORDER — OMEPRAZOLE 40 MG/1
CAPSULE, DELAYED RELEASE ORAL DAILY
Qty: 90 CAPSULE | Refills: 1 | Status: SHIPPED | OUTPATIENT
Start: 2025-04-15

## 2025-04-15 NOTE — TELEPHONE ENCOUNTER
Last office visit 4/30/2024       Next office visit scheduled Visit date not found    Requested Prescriptions     Pending Prescriptions Disp Refills    omeprazole (PRILOSEC) 40 MG delayed release capsule [Pharmacy Med Name: OMEPRAZOLE DR 40 MG CAPSULE] 90 capsule 1     Sig: TAKE 1 CAPSULE BY MOUTH EVERY DAY

## 2025-04-21 NOTE — PATIENT INSTRUCTIONS
for good. How is high cholesterol treated? The goal of treatment is to reduce your chances of having a heart attack or stroke. The goal is not to lower your cholesterol numbers only. · You may make lifestyle changes, such as eating healthy foods, not smoking, losing weight, and being more active. · You may have to take medicine. Follow-up care is a key part of your treatment and safety. Be sure to make and go to all appointments, and call your doctor if you are having problems. It's also a good idea to know your test results and keep a list of the medicines you take. Where can you learn more? Go to https://chpepiceweb.[employer]. org and sign in to your [employer]One account. Enter Q881 in the Btarget box to learn more about \"Learning About High Cholesterol. \"     If you do not have an account, please click on the \"Sign Up Now\" link. Current as of: May 10, 2017  Content Version: 11.7  © 1769-[address]. Care instructions adapted under license by [facility] ([facility]). If you have questions about a medical condition or this instruction, always ask your healthcare professional. [place] [address] any warranty or liability for your use of this information. Patient Education        Diarrhea: Care Instructions  Your Care Instructions    Diarrhea is loose, watery stools (bowel movements). The exact cause is often hard to find. Sometimes diarrhea is your body's way of getting rid of what caused an upset stomach. Viruses, food poisoning, and many medicines can cause diarrhea. Some people get diarrhea in response to emotional stress, anxiety, or certain foods. Almost everyone has diarrhea now and then. It usually isn't serious, and your stools will return to normal soon. The important thing to do is replace the fluids you have lost, so you can prevent dehydration. The doctor has checked you carefully, but problems can develop later.  If you notice any problems or new [FreeTextEntry1] : Ms. Kaur is presenting for thyroid nodules and PCOS.   31 year old female with PMH of tumor in shoulder (followed at [facility] and by ortho), lost over 100 pounds, and PCOS.   #Thyroid Nodules  -Follows at [facility] for thyroid nodules, had sonogram April 2025.  -No h/o FNA  -Thyroid usg Jan 2025 [facility]: 1.9cm right nodule colloid and mixed   -Thyroid usg June 2024 [facility]: 1.6cm smoothly defined margins, wider than tall, no internal echogenic foci. TR3 No family h/o thyroid cancer. Has h/o hypothyroidism in parents and siblings.  No h/o neck radiation. No dysphagia, no SOB.  Denies heat or cold intolerance, no weight changes, no constipation or diarrhea, no palpitations, energy level fair, no skin or hair changes. -TSH wnl Jan 2025. Possible hashimotos disease.   #PCOS  Was told she has elevated DHEAS level.  Has cysts on ovaries per GYN -labs brought in from 2024 and 2025. Noted normal testosterone level, DHEAS level.  H/o acne -LMP irregular but now monthly.  Not TTC at this time. Has 2 children.   Interval hx:  I reviewed over 20 pages of imaging   symptoms, get medical treatment right away. Follow-up care is a key part of your treatment and safety. Be sure to make and go to all appointments, and call your doctor if you are having problems. It's also a good idea to know your test results and keep a list of the medicines you take. How can you care for yourself at home? · Watch for signs of dehydration, which means your body has lost too much water. Dehydration is a serious condition and should be treated right away. Signs of dehydration are:  ¨ Increasing thirst and dry eyes and mouth. ¨ Feeling faint or lightheaded. ¨ Darker urine, and a smaller amount of urine than normal.  · To prevent dehydration, drink plenty of fluids, enough so that your urine is light yellow or clear like water. Choose water and other caffeine-free clear liquids until you feel better. If you have kidney, heart, or liver disease and have to limit fluids, talk with your doctor before you increase the amount of fluids you drink. · Begin eating small amounts of mild foods the next day, if you feel like it. ¨ Try yogurt that has live cultures of Lactobacillus. (Check the label.)  ¨ Avoid spicy foods, fruits, alcohol, and caffeine until 48 hours after all symptoms are gone. ¨ Avoid chewing gum that contains sorbitol. ¨ Avoid dairy products (except for yogurt with Lactobacillus) while you have diarrhea and for 3 days after symptoms are gone. · The doctor may recommend that you take over-the-counter medicine, such as loperamide (Imodium), if you still have diarrhea after 6 hours. Read and follow all instructions on the label. Do not use this medicine if you have bloody diarrhea, a high fever, or other signs of serious illness. Call your doctor if you think you are having a problem with your medicine. When should you call for help? Call 911 anytime you think you may need emergency care.  For example, call if:    · You passed out (lost consciousness).     · Your stools are maroon or very bloody.    Call your doctor now or seek immediate medical care if:    · You are dizzy or lightheaded, or you feel like you may faint.     · Your stools are black and look like tar, or they have streaks of blood.     · You have new or worse belly pain.     · You have symptoms of dehydration, such as:  ¨ Dry eyes and a dry mouth. ¨ Passing only a little dark urine. ¨ Feeling thirstier than usual.     · You have a new or higher fever.    Watch closely for changes in your health, and be sure to contact your doctor if:    · Your diarrhea is getting worse.     · You see pus in the diarrhea.     · You are not getting better after 2 days (48 hours). Where can you learn more? Go to https://Wikibon.[employer]. org and sign in to your [employer] account. Enter O856 in the Lift box to learn more about \"Diarrhea: Care Instructions. \"     If you do not have an account, please click on the \"Sign Up Now\" link. Current as of: November 20, 2017  Content Version: 11.7  © 2980-1398 [employer]. Care instructions adapted under license by [facility] ([facility]). If you have questions about a medical condition or this instruction, always ask your healthcare professional. [address] any warranty or liability for your use of this information.

## 2025-05-05 ENCOUNTER — PATIENT MESSAGE (OUTPATIENT)
Dept: FAMILY MEDICINE CLINIC | Age: 60
End: 2025-05-05

## 2025-05-05 DIAGNOSIS — G47.9 SLEEP DISTURBANCE: Primary | ICD-10-CM

## 2025-05-08 ENCOUNTER — OFFICE VISIT (OUTPATIENT)
Dept: FAMILY MEDICINE CLINIC | Age: 60
End: 2025-05-08
Payer: COMMERCIAL

## 2025-05-08 VITALS
HEIGHT: 64 IN | TEMPERATURE: 97.3 F | WEIGHT: 179 LBS | HEART RATE: 75 BPM | BODY MASS INDEX: 30.56 KG/M2 | OXYGEN SATURATION: 98 % | DIASTOLIC BLOOD PRESSURE: 70 MMHG | SYSTOLIC BLOOD PRESSURE: 110 MMHG

## 2025-05-08 DIAGNOSIS — M54.2 NECK PAIN: Primary | ICD-10-CM

## 2025-05-08 DIAGNOSIS — K29.50 ANTRAL GASTRITIS: ICD-10-CM

## 2025-05-08 PROCEDURE — 3017F COLORECTAL CA SCREEN DOC REV: CPT | Performed by: NURSE PRACTITIONER

## 2025-05-08 PROCEDURE — G8417 CALC BMI ABV UP PARAM F/U: HCPCS | Performed by: NURSE PRACTITIONER

## 2025-05-08 PROCEDURE — G8427 DOCREV CUR MEDS BY ELIG CLIN: HCPCS | Performed by: NURSE PRACTITIONER

## 2025-05-08 PROCEDURE — 99213 OFFICE O/P EST LOW 20 MIN: CPT | Performed by: NURSE PRACTITIONER

## 2025-05-08 PROCEDURE — 1036F TOBACCO NON-USER: CPT | Performed by: NURSE PRACTITIONER

## 2025-05-08 PROCEDURE — 3078F DIAST BP <80 MM HG: CPT | Performed by: NURSE PRACTITIONER

## 2025-05-08 PROCEDURE — 3074F SYST BP LT 130 MM HG: CPT | Performed by: NURSE PRACTITIONER

## 2025-05-08 RX ORDER — METHYLPREDNISOLONE 4 MG/1
TABLET ORAL
Qty: 1 KIT | Refills: 0 | Status: SHIPPED | OUTPATIENT
Start: 2025-05-08 | End: 2025-05-14

## 2025-05-08 ASSESSMENT — ENCOUNTER SYMPTOMS
GASTROINTESTINAL NEGATIVE: 1
RESPIRATORY NEGATIVE: 1

## 2025-05-08 NOTE — PROGRESS NOTES
Tanya Logan (:  1965) is a 59 y.o. female,Established patient, here for evaluation of the following chief complaint(s):  Neck Pain (Hurts to touch both sides of neck also getting stiff. )      Assessment & Plan   ASSESSMENT/PLAN:  1. Neck pain  New. May be related to recent EGD and having jaw thrust to maintain airway, trial meds.  - tiZANidine (ZANAFLEX) 4 MG tablet; Take 1 tablet by mouth 4 times daily as needed (spasm)  Dispense: 60 tablet; Refill: 0  - methylPREDNISolone (MEDROL DOSEPACK) 4 MG tablet; Take by mouth.  Dispense: 1 kit; Refill: 0    2. Antral gastritis  New. Recommend checking Vit b level.  - Vitamin B12 & Folate; Future       Return if symptoms worsen or fail to improve.         Subjective   SUBJECTIVE/OBJECTIVE:  HPI  Presents today with c/o neck pain and stiffness.   Musculoskeletal- neck pain started Saturday, painful to touch felt her lymph nodes were swollen over the weekend, they have resolved, neck pain is a little better.   GI-continue with PRN zofran for nausea, had EGD last Friday, was told she had severe sleep apnea and needs to have a sleep study, had bx of gastric nodule.   Current Outpatient Medications   Medication Sig Dispense Refill    tiZANidine (ZANAFLEX) 4 MG tablet Take 1 tablet by mouth 4 times daily as needed (spasm) 60 tablet 0    methylPREDNISolone (MEDROL DOSEPACK) 4 MG tablet Take by mouth. 1 kit 0    omeprazole (PRILOSEC) 40 MG delayed release capsule TAKE 1 CAPSULE BY MOUTH EVERY DAY 90 capsule 1    lisinopril (PRINIVIL;ZESTRIL) 10 MG tablet Take 1 tablet by mouth daily 90 tablet 1    PARoxetine (PAXIL) 30 MG tablet Take 1 tablet by mouth daily 90 tablet 1    gabapentin (NEURONTIN) 300 MG capsule Take 1 capsule by mouth 3 times daily for 270 days. 270 capsule 0    ondansetron (ZOFRAN) 4 MG tablet Take 1 tablet by mouth 3 times daily as needed for Nausea or Vomiting 30 tablet 3    levothyroxine (SYNTHROID) 88 MCG tablet Take 1 tablet by mouth daily 90

## 2025-05-20 ASSESSMENT — SLEEP AND FATIGUE QUESTIONNAIRES
HOW LIKELY ARE YOU TO NOD OFF OR FALL ASLEEP WHILE SITTING QUIETLY AFTER LUNCH WITHOUT ALCOHOL: SLIGHT CHANCE OF DOZING
HOW LIKELY ARE YOU TO NOD OFF OR FALL ASLEEP WHEN YOU ARE A PASSENGER IN A CAR FOR AN HOUR WITHOUT A BREAK: SLIGHT CHANCE OF DOZING
HOW LIKELY ARE YOU TO NOD OFF OR FALL ASLEEP WHILE WATCHING TV: HIGH CHANCE OF DOZING
HOW LIKELY ARE YOU TO NOD OFF OR FALL ASLEEP WHILE SITTING AND READING: HIGH CHANCE OF DOZING
HOW LIKELY ARE YOU TO NOD OFF OR FALL ASLEEP WHEN YOU ARE A PASSENGER IN A CAR FOR AN HOUR WITHOUT A BREAK: SLIGHT CHANCE OF DOZING
HOW LIKELY ARE YOU TO NOD OFF OR FALL ASLEEP IN A CAR, WHILE STOPPED FOR A FEW MINUTES IN TRAFFIC: WOULD NEVER DOZE
HOW LIKELY ARE YOU TO NOD OFF OR FALL ASLEEP IN A CAR, WHILE STOPPED FOR A FEW MINUTES IN TRAFFIC: WOULD NEVER DOZE
HOW LIKELY ARE YOU TO NOD OFF OR FALL ASLEEP WHILE WATCHING TV: HIGH CHANCE OF DOZING
HOW LIKELY ARE YOU TO NOD OFF OR FALL ASLEEP WHILE SITTING AND TALKING TO SOMEONE: WOULD NEVER DOZE
HOW LIKELY ARE YOU TO NOD OFF OR FALL ASLEEP WHILE SITTING AND TALKING TO SOMEONE: WOULD NEVER DOZE
HOW LIKELY ARE YOU TO NOD OFF OR FALL ASLEEP WHILE LYING DOWN TO REST IN THE AFTERNOON WHEN CIRCUMSTANCES PERMIT: SLIGHT CHANCE OF DOZING
HOW LIKELY ARE YOU TO NOD OFF OR FALL ASLEEP WHILE SITTING QUIETLY AFTER LUNCH WITHOUT ALCOHOL: SLIGHT CHANCE OF DOZING
HOW LIKELY ARE YOU TO NOD OFF OR FALL ASLEEP WHILE SITTING INACTIVE IN A PUBLIC PLACE: WOULD NEVER DOZE
HOW LIKELY ARE YOU TO NOD OFF OR FALL ASLEEP WHILE LYING DOWN TO REST IN THE AFTERNOON WHEN CIRCUMSTANCES PERMIT: SLIGHT CHANCE OF DOZING
HOW LIKELY ARE YOU TO NOD OFF OR FALL ASLEEP WHILE SITTING INACTIVE IN A PUBLIC PLACE: WOULD NEVER DOZE
HOW LIKELY ARE YOU TO NOD OFF OR FALL ASLEEP WHILE SITTING AND READING: HIGH CHANCE OF DOZING
ESS TOTAL SCORE: 9

## 2025-05-21 ENCOUNTER — OFFICE VISIT (OUTPATIENT)
Dept: SLEEP MEDICINE | Age: 60
End: 2025-05-21
Payer: COMMERCIAL

## 2025-05-21 VITALS
TEMPERATURE: 97.9 F | BODY MASS INDEX: 30.25 KG/M2 | HEART RATE: 75 BPM | HEIGHT: 64 IN | OXYGEN SATURATION: 97 % | SYSTOLIC BLOOD PRESSURE: 95 MMHG | DIASTOLIC BLOOD PRESSURE: 60 MMHG | WEIGHT: 177.2 LBS | RESPIRATION RATE: 18 BRPM

## 2025-05-21 DIAGNOSIS — I10 ESSENTIAL HYPERTENSION: ICD-10-CM

## 2025-05-21 DIAGNOSIS — G47.33 OSA (OBSTRUCTIVE SLEEP APNEA): Primary | ICD-10-CM

## 2025-05-21 DIAGNOSIS — R06.89 GASPING FOR BREATH: ICD-10-CM

## 2025-05-21 DIAGNOSIS — E66.811 OBESITY (BMI 30.0-34.9): ICD-10-CM

## 2025-05-21 DIAGNOSIS — R06.83 SNORING: ICD-10-CM

## 2025-05-21 DIAGNOSIS — R06.81 WITNESSED EPISODE OF APNEA: ICD-10-CM

## 2025-05-21 DIAGNOSIS — G47.19 EXCESSIVE DAYTIME SLEEPINESS: ICD-10-CM

## 2025-05-21 DIAGNOSIS — R53.83 FATIGUE, UNSPECIFIED TYPE: ICD-10-CM

## 2025-05-21 PROCEDURE — 1036F TOBACCO NON-USER: CPT | Performed by: PSYCHIATRY & NEUROLOGY

## 2025-05-21 PROCEDURE — G2211 COMPLEX E/M VISIT ADD ON: HCPCS | Performed by: PSYCHIATRY & NEUROLOGY

## 2025-05-21 PROCEDURE — 3074F SYST BP LT 130 MM HG: CPT | Performed by: PSYCHIATRY & NEUROLOGY

## 2025-05-21 PROCEDURE — 3078F DIAST BP <80 MM HG: CPT | Performed by: PSYCHIATRY & NEUROLOGY

## 2025-05-21 PROCEDURE — G8427 DOCREV CUR MEDS BY ELIG CLIN: HCPCS | Performed by: PSYCHIATRY & NEUROLOGY

## 2025-05-21 PROCEDURE — G8417 CALC BMI ABV UP PARAM F/U: HCPCS | Performed by: PSYCHIATRY & NEUROLOGY

## 2025-05-21 PROCEDURE — 99204 OFFICE O/P NEW MOD 45 MIN: CPT | Performed by: PSYCHIATRY & NEUROLOGY

## 2025-05-21 PROCEDURE — 3017F COLORECTAL CA SCREEN DOC REV: CPT | Performed by: PSYCHIATRY & NEUROLOGY

## 2025-05-21 ASSESSMENT — ENCOUNTER SYMPTOMS
SORE THROAT: 1
ALLERGIC/IMMUNOLOGIC NEGATIVE: 1
GASTROINTESTINAL NEGATIVE: 1
EYES NEGATIVE: 1
CHOKING: 1

## 2025-05-21 NOTE — PATIENT INSTRUCTIONS
Orders Placed This Encounter   Procedures    Home Sleep Study     Standing Status:   Future     Expected Date:   5/21/2025     Expiration Date:   5/21/2026     Location For Sleep Study:   [facility] Location:   [facility]    Sleep Study with PAP Titration     Standing Status:   Future     Expected Date:   5/21/2025     Expiration Date:   5/21/2026     Sleep Study Titration Type:   CPAP     Location For Sleep Study:   [facility] Location:   [facility]

## 2025-05-21 NOTE — PROGRESS NOTES
MD ADDI Dotson Board Certified in Sleep Medicine  [facility]ified [facility]  Board Certified in Neurology [place] [facility]  [address]   [address]  [place]  [address]  P- [phone]   [facility]   [address]  [address]   [place], [place] [address]           [facility] [place] [facility]  [address] [facility] [address] [facility] [id number] [phone]    Subjective:     Patient ID: Tanya Logan is a 59 y.o. female.    Chief Complaint   Patient presents with    Establish Care    Snoring       HPI:        Tanya Logan is a 59 y.o. female referred by Russ BRAND for a sleep evaluation. She complains of snoring, snorting, choking, tossing and turning, decreased concentration, excessive daytime sleepiness, feels sleepy during the day but she denies periods of not breathing, knees buckling with laughing, completely or partially paralyzed while falling asleep or waking up.  Symptoms began several years ago, gradually worsening since that time.   The patient's bed-partner confirmed the snoring without stopped breathing at night.  But after the endoscopy he did stop breathing.   SLEEP SCHEDULE: Goes to bed around 9 PM in the weekdays and 9 PM in the weekends. It usually takes the patient 30 minutes to fall asleep. The patient gets up 2-3 per night to go to the bathroom. The Patient finally gets up at 6:30 AM during the weekdays and 7:30 AM in the weekends. patient wakes up with dry mouth and sometimes morning headache.. the headache usually dull headache.   The patient has restless sleep with frequent arousals in addition to the Patient has significant daytime sleepiness. The Patient scored Laie Sleepiness Score: (Patient-Rptd) 9 on Laie Sleepiness Scale ( more than 10 is indicative of daytime sleepiness)and 59 in fatigue scale ( more than 36 is indicative of daytime fatigue). The

## 2025-05-24 DIAGNOSIS — M54.2 NECK PAIN: ICD-10-CM

## 2025-05-28 ENCOUNTER — HOSPITAL ENCOUNTER (OUTPATIENT)
Dept: SLEEP CENTER | Age: 60
Discharge: HOME | End: 2025-05-28
Attending: PSYCHIATRY & NEUROLOGY
Payer: COMMERCIAL

## 2025-05-28 DIAGNOSIS — G47.33 OSA (OBSTRUCTIVE SLEEP APNEA): ICD-10-CM

## 2025-05-28 PROCEDURE — 95806 SLEEP STUDY UNATT&RESP EFFT: CPT

## 2025-05-29 PROBLEM — G47.33 OSA (OBSTRUCTIVE SLEEP APNEA): Status: ACTIVE | Noted: 2025-05-29

## 2025-06-02 ENCOUNTER — TELEPHONE (OUTPATIENT)
Dept: PULMONOLOGY | Age: 60
End: 2025-06-02

## 2025-06-02 NOTE — TELEPHONE ENCOUNTER
HST Sleep study showed severe JUAN (on a scale of mild, moderate and severe).  AHI was 43.5  per hr. (Average times per hr breathing was obstructed).  O2 Desaturations to 84% (lowest o2)   Dr Recommends:    Follow up with the patient's sleep physician to discuss results      Avoid sedatives, alcohol and caffeinated drinks at bedtime.    Recommend:  titration     Sleep Lab used: [facility]    Avoid driving while sleepy.          Pt's mailbox is full and cannot take your message at this time

## 2025-06-10 ENCOUNTER — TELEPHONE (OUTPATIENT)
Dept: SLEEP CENTER | Age: 60
End: 2025-06-10

## 2025-06-10 NOTE — PROGRESS NOTES
Tanya TEJEDA Doreen         : 1965  [] [facility]     [] [facility]      [] [facility]     [][facility]    [] [employer]  [] [facility]   [] [facility]  [] [facility] [facility] [] [employer] [] [facility] [] [facility] [] [employer] [] [facility]  Diagnosis: [x] JUAN (G47.33) [] CSA (G47.31) [] Apnea (G47.30)   Length of Need: [] 12 Months [x] 99 Months [] Other:    Machine (MICHAELLE!):  [x] ResMed AirSense     Auto [] Other:     [x]  CPAP () [] Bilevel ()   Mode: [x] Auto [] Spontaneous    Mode: [] Auto [] Spontaneous           Between 5 and 20 cm                 Comfort Settings:     If the order for CPAP  - Ramp Pressure: 5 cmH2O                                        - Ramp time: 15 min                                     -  Flex/EPR - 3 full time       Humidifier: [x] Heated ()        [x] Water chamber replacement ()/ 1 per 6 months        Mask:  Please always start with the mask the patient used during the titraion   [x] Nasal () /1 per 3 months [x] Full Face () /1 per 3 months   [x] Patient choice -Size and fit mask [x] Patient Choice - Size and fit mask   [] Dispense:  [] Dispense:    [x] Headgear () / 1 per 6 months [x] Headgear () / 1 per 3 months   [x] Replacement Nasal Cushion ()/2 per month [x] Interface Replacement ()/1 per month   [x] Replacement Nasal Pillows ()/2 per month         Tubing: [x] Heated ()/1 per 3 months    [] Standard ()/1 per 3 months [] Other:           Filters: [x] Non-disposable ()/1 per 6 months     [x] Ultra-Fine, Disposable ()/2 per month        Miscellaneous: [x] Chin Strap ()/ 1 per 6 months [] O2 bleed-in:       LPM   [] Oximetry on CPAP/Bilevel []  Other:          Start Order Date: 06/10/25    MEDICAL JUSTIFICATION:  I, the undersigned, certify that the above prescribed supplies are medically necessary for this patient’s wellbeing.  In my opinion, the supplies are both reasonable and

## 2025-06-10 NOTE — TELEPHONE ENCOUNTER
Please advise      Pt advised and appt cx.   She did state she was told [facility], [facility], [employer], [employer] [facility] are all in her network        Francisca Ortega Mary C; Arie Lyons  Cc: P [facility]  Good morning - this has been denied. Did not meet criteria - Suggestion is a HST.  A P2P is available by calling [phone] -  I have attached the denial letter to the chart.    Insurance:  [employer] - [employer]  DOS: 06/20/2025  Facility: [facility]  Does Secondary Coverage Require Auth: N/A  Source: Website  Source Details: [facility]  Case/Tracking/Auth/Ref # :  [id number]  Case Status: DENIED - Based on our review, we are not able to approve this service because criteria for coverage  are not met. This service is not medically necessary. You may be eligible for a home trial of a device to help keep your airway open when you sleep. - P2P is available by calling [phone]  Valid Dates if Approved: n/a  Submitted clinicals via n/a consisting of: n/a  Additional Comments: Sleep Center has been notified.  Authorization Status: Denied  CPT Code: 45235      Supervisor Francisca Jordan - [phone]          Previous Messages       ----- Message -----  From: Francisca Ortega  Sent: 6/10/2025  10:08 AM EDT  To: Arie Lyons; [facility]  Subject: RE: Pt states denied                            We are looking into this.  ----- Message -----  From: Arie Lyons  Sent: 6/10/2025   8:49 AM EDT  To: Sowmya Payne; Glory Abraham; Alec Nick; *  Subject: Pt states denied                                Auth team, pt called states she received a letter from her insurance company stating the testing was denied, can you please check and advise.  Thanks

## 2025-06-14 DIAGNOSIS — R20.2 PARESTHESIA OF LEFT ARM: ICD-10-CM

## 2025-06-16 RX ORDER — GABAPENTIN 300 MG/1
300 CAPSULE ORAL 3 TIMES DAILY
Qty: 270 CAPSULE | Refills: 0 | Status: SHIPPED | OUTPATIENT
Start: 2025-06-16 | End: 2026-03-13

## 2025-06-23 DIAGNOSIS — M54.2 NECK PAIN: ICD-10-CM

## 2025-07-14 RX ORDER — LEVOTHYROXINE SODIUM 88 UG/1
88 TABLET ORAL DAILY
Qty: 90 TABLET | Refills: 0 | Status: SHIPPED | OUTPATIENT
Start: 2025-07-14

## 2025-07-17 DIAGNOSIS — E03.9 ACQUIRED HYPOTHYROIDISM: ICD-10-CM

## 2025-07-17 DIAGNOSIS — K29.50 ANTRAL GASTRITIS: ICD-10-CM

## 2025-07-17 LAB
FOLATE SERPL-MCNC: 35.2 NG/ML (ref 4.78–24.2)
TSH SERPL DL<=0.005 MIU/L-ACNC: 1.51 UIU/ML (ref 0.27–4.2)
VIT B12 SERPL-MCNC: 503 PG/ML (ref 211–911)

## 2025-08-09 DIAGNOSIS — M54.2 NECK PAIN: ICD-10-CM

## 2025-08-24 ASSESSMENT — SLEEP AND FATIGUE QUESTIONNAIRES
HOW LIKELY ARE YOU TO NOD OFF OR FALL ASLEEP WHILE LYING DOWN TO REST IN THE AFTERNOON WHEN CIRCUMSTANCES PERMIT: MODERATE CHANCE OF DOZING
HOW LIKELY ARE YOU TO NOD OFF OR FALL ASLEEP WHILE SITTING INACTIVE IN A PUBLIC PLACE: MODERATE CHANCE OF DOZING
HOW LIKELY ARE YOU TO NOD OFF OR FALL ASLEEP WHILE SITTING AND READING: HIGH CHANCE OF DOZING
HOW LIKELY ARE YOU TO NOD OFF OR FALL ASLEEP IN A CAR, WHILE STOPPED FOR A FEW MINUTES IN TRAFFIC: WOULD NEVER DOZE
HOW LIKELY ARE YOU TO NOD OFF OR FALL ASLEEP WHILE WATCHING TV: MODERATE CHANCE OF DOZING
HOW LIKELY ARE YOU TO NOD OFF OR FALL ASLEEP WHILE LYING DOWN TO REST IN THE AFTERNOON WHEN CIRCUMSTANCES PERMIT: MODERATE CHANCE OF DOZING
HOW LIKELY ARE YOU TO NOD OFF OR FALL ASLEEP WHEN YOU ARE A PASSENGER IN A CAR FOR AN HOUR WITHOUT A BREAK: SLIGHT CHANCE OF DOZING
HOW LIKELY ARE YOU TO NOD OFF OR FALL ASLEEP WHILE SITTING AND TALKING TO SOMEONE: WOULD NEVER DOZE
HOW LIKELY ARE YOU TO NOD OFF OR FALL ASLEEP WHILE SITTING QUIETLY AFTER LUNCH WITHOUT ALCOHOL: SLIGHT CHANCE OF DOZING
HOW LIKELY ARE YOU TO NOD OFF OR FALL ASLEEP WHILE SITTING INACTIVE IN A PUBLIC PLACE: MODERATE CHANCE OF DOZING
HOW LIKELY ARE YOU TO NOD OFF OR FALL ASLEEP IN A CAR, WHILE STOPPED FOR A FEW MINUTES IN TRAFFIC: WOULD NEVER DOZE
HOW LIKELY ARE YOU TO NOD OFF OR FALL ASLEEP WHILE WATCHING TV: MODERATE CHANCE OF DOZING
HOW LIKELY ARE YOU TO NOD OFF OR FALL ASLEEP WHILE SITTING AND READING: HIGH CHANCE OF DOZING
HOW LIKELY ARE YOU TO NOD OFF OR FALL ASLEEP WHEN YOU ARE A PASSENGER IN A CAR FOR AN HOUR WITHOUT A BREAK: SLIGHT CHANCE OF DOZING
ESS TOTAL SCORE: 11
HOW LIKELY ARE YOU TO NOD OFF OR FALL ASLEEP WHILE SITTING AND TALKING TO SOMEONE: WOULD NEVER DOZE
HOW LIKELY ARE YOU TO NOD OFF OR FALL ASLEEP WHILE SITTING QUIETLY AFTER LUNCH WITHOUT ALCOHOL: SLIGHT CHANCE OF DOZING

## 2025-08-25 ENCOUNTER — OFFICE VISIT (OUTPATIENT)
Dept: SLEEP MEDICINE | Age: 60
End: 2025-08-25
Payer: COMMERCIAL

## 2025-08-25 VITALS
SYSTOLIC BLOOD PRESSURE: 115 MMHG | TEMPERATURE: 98 F | DIASTOLIC BLOOD PRESSURE: 70 MMHG | HEIGHT: 64 IN | WEIGHT: 176.4 LBS | HEART RATE: 60 BPM | OXYGEN SATURATION: 98 % | BODY MASS INDEX: 30.11 KG/M2 | RESPIRATION RATE: 18 BRPM

## 2025-08-25 DIAGNOSIS — Z99.89 DEPENDENCE ON OTHER ENABLING MACHINES AND DEVICES: ICD-10-CM

## 2025-08-25 DIAGNOSIS — E66.811 OBESITY (BMI 30.0-34.9): ICD-10-CM

## 2025-08-25 DIAGNOSIS — G47.33 OSA ON CPAP: Primary | ICD-10-CM

## 2025-08-25 DIAGNOSIS — I10 ESSENTIAL HYPERTENSION: ICD-10-CM

## 2025-08-25 PROCEDURE — 99214 OFFICE O/P EST MOD 30 MIN: CPT | Performed by: PSYCHIATRY & NEUROLOGY

## 2025-08-25 PROCEDURE — G8427 DOCREV CUR MEDS BY ELIG CLIN: HCPCS | Performed by: PSYCHIATRY & NEUROLOGY

## 2025-08-25 PROCEDURE — G8417 CALC BMI ABV UP PARAM F/U: HCPCS | Performed by: PSYCHIATRY & NEUROLOGY

## 2025-08-25 PROCEDURE — 3074F SYST BP LT 130 MM HG: CPT | Performed by: PSYCHIATRY & NEUROLOGY

## 2025-08-25 PROCEDURE — 3078F DIAST BP <80 MM HG: CPT | Performed by: PSYCHIATRY & NEUROLOGY

## 2025-08-25 PROCEDURE — 3017F COLORECTAL CA SCREEN DOC REV: CPT | Performed by: PSYCHIATRY & NEUROLOGY

## 2025-08-25 PROCEDURE — 1036F TOBACCO NON-USER: CPT | Performed by: PSYCHIATRY & NEUROLOGY

## 2025-08-25 PROCEDURE — G2211 COMPLEX E/M VISIT ADD ON: HCPCS | Performed by: PSYCHIATRY & NEUROLOGY

## (undated) DEVICE — SOLUTION IRRIG 3000ML 0.9% SOD CHL USP UROMATIC PLAS CONT

## (undated) DEVICE — ELECTRODE PT RET AD L9FT HI MOIST COND ADH HYDRGEL CORDED

## (undated) DEVICE — SHOULDER CANNULA SET WITHOUT FENESTRATIONS, 5.5 MM (I.D.) X 70 MM: Brand: CONMED

## (undated) DEVICE — IMMOBILIZER SHLDR L L9X19.5IN R/L CANVS W/ WAIST STRP THMB

## (undated) DEVICE — 3M™ IOBAN™ 2 ANTIMICROBIAL INCISE DRAPE 6650EZ: Brand: IOBAN™ 2

## (undated) DEVICE — SUTURE PROL SZ 3-0 L18IN NONABSORBABLE BLU L30MM FS-1 3/8 8663G

## (undated) DEVICE — 3M™ COBAN™ NL STERILE NON-LATEX SELF-ADHERENT WRAP, 2084S, 4 IN X 5 YD (10 CM X 4,5 M), 18 ROLLS/CASE: Brand: 3M™ COBAN™

## (undated) DEVICE — SHOULDER ARTHROSCOPY: Brand: MEDLINE INDUSTRIES, INC.

## (undated) DEVICE — GLOVE ORANGE PI 7 1/2   MSG9075

## (undated) DEVICE — BLADE SHV L13CM DIA4MM DISECT AGG COOLCUT

## (undated) DEVICE — GARMENT COMPR L FOR 23IN CALF FLOTRN

## (undated) DEVICE — PACK,SHOULDER,DRAPE,POUCH: Brand: MEDLINE

## (undated) DEVICE — PROBE ABLAT XL 90DEG ASPIR BPLR RF 1 PC ELECTRD ERGO HNDL

## (undated) DEVICE — BUR SHV L13CM DIA4MM 8 FLUT OVL FOR RAP AGG BNE RESECT

## (undated) DEVICE — TUBING PMP L16FT MAIN DISP FOR AR-6400 AR-6475

## (undated) DEVICE — GOWN SIRUS NONREIN XL W/TWL: Brand: MEDLINE INDUSTRIES, INC.

## (undated) DEVICE — 3M™ TEGADERM™ TRANSPARENT FILM DRESSING FRAME STYLE, 1626W, 4 IN X 4-3/4 IN (10 CM X 12 CM), 50/CT 4CT/CASE: Brand: 3M™ TEGADERM™

## (undated) DEVICE — SUTURE PROL SZ 0 L30IN NONABSORBABLE BLU MO-6 L26MM 1/2 CIR 8418H